# Patient Record
Sex: FEMALE | Race: WHITE | NOT HISPANIC OR LATINO | Employment: OTHER | URBAN - METROPOLITAN AREA
[De-identification: names, ages, dates, MRNs, and addresses within clinical notes are randomized per-mention and may not be internally consistent; named-entity substitution may affect disease eponyms.]

---

## 2023-09-01 ENCOUNTER — HOSPITAL ENCOUNTER (OUTPATIENT)
Dept: DATA CONVERSION | Facility: HOSPITAL | Age: 68
Discharge: HOME | End: 2023-09-01
Payer: MEDICARE

## 2023-09-01 DIAGNOSIS — E78.2 MIXED HYPERLIPIDEMIA: ICD-10-CM

## 2023-09-01 DIAGNOSIS — E11.9 TYPE 2 DIABETES MELLITUS WITHOUT COMPLICATIONS (MULTI): ICD-10-CM

## 2023-09-01 DIAGNOSIS — E04.1 NONTOXIC SINGLE THYROID NODULE: ICD-10-CM

## 2023-09-01 DIAGNOSIS — I10 ESSENTIAL (PRIMARY) HYPERTENSION: ICD-10-CM

## 2023-09-01 LAB
ALBUMIN SERPL-MCNC: 3.7 GM/DL (ref 3.5–5)
ALBUMIN/GLOB SERPL: 1.1 RATIO (ref 1.5–3)
ALP BLD-CCNC: 84 U/L (ref 35–125)
ALT SERPL-CCNC: 13 U/L (ref 5–40)
ANION GAP SERPL CALCULATED.3IONS-SCNC: 11 MMOL/L (ref 0–19)
APPEARANCE PLAS: ABNORMAL
AST SERPL-CCNC: 16 U/L (ref 5–40)
BASOPHILS # BLD AUTO: 0.04 K/UL (ref 0–0.22)
BASOPHILS NFR BLD AUTO: 0.3 % (ref 0–1)
BILIRUB SERPL-MCNC: 0.5 MG/DL (ref 0.1–1.2)
BUN SERPL-MCNC: 17 MG/DL (ref 8–25)
BUN/CREAT SERPL: 24.3 RATIO (ref 8–21)
CALCIUM SERPL-MCNC: 9.2 MG/DL (ref 8.5–10.4)
CHLORIDE SERPL-SCNC: 99 MMOL/L (ref 97–107)
CHOLEST SERPL-MCNC: 148 MG/DL (ref 133–200)
CHOLEST/HDLC SERPL: 3.2 RATIO
CO2 SERPL-SCNC: 27 MMOL/L (ref 24–31)
COLOR SPUN FLD: YELLOW
CREAT SERPL-MCNC: 0.7 MG/DL (ref 0.4–1.6)
DEPRECATED RDW RBC AUTO: 42.2 FL (ref 37–54)
DIFFERENTIAL METHOD BLD: ABNORMAL
EOSINOPHIL # BLD AUTO: 0.15 K/UL (ref 0–0.45)
EOSINOPHIL NFR BLD: 1.2 % (ref 0–3)
ERYTHROCYTE [DISTWIDTH] IN BLOOD BY AUTOMATED COUNT: 12.9 % (ref 11.7–15)
FASTING STATUS PATIENT QL REPORTED: ABNORMAL
GFR SERPL CREATININE-BSD FRML MDRD: 94 ML/MIN/1.73 M2
GLOBULIN SER-MCNC: 3.3 G/DL (ref 1.9–3.7)
GLUCOSE SERPL-MCNC: 200 MG/DL (ref 65–99)
HBA1C MFR BLD: 8.6 % (ref 4–6)
HCT VFR BLD AUTO: 49.7 % (ref 36–44)
HDLC SERPL-MCNC: 46 MG/DL
HGB BLD-MCNC: 16.7 GM/DL (ref 12–15)
IMM GRANULOCYTES # BLD AUTO: 0.1 K/UL (ref 0–0.1)
LDLC SERPL CALC-MCNC: 78 MG/DL (ref 65–130)
LYMPHOCYTES # BLD AUTO: 2.74 K/UL (ref 1.2–3.2)
LYMPHOCYTES NFR BLD MANUAL: 22.6 % (ref 20–40)
MCH RBC QN AUTO: 29.7 PG (ref 26–34)
MCHC RBC AUTO-ENTMCNC: 33.6 % (ref 31–37)
MCV RBC AUTO: 88.3 FL (ref 80–100)
MONOCYTES # BLD AUTO: 0.76 K/UL (ref 0–0.8)
MONOCYTES NFR BLD MANUAL: 6.3 % (ref 0–8)
NEUTROPHILS # BLD AUTO: 8.36 K/UL
NEUTROPHILS # BLD AUTO: 8.36 K/UL (ref 1.8–7.7)
NEUTROPHILS.IMMATURE NFR BLD: 0.8 % (ref 0–1)
NEUTS SEG NFR BLD: 68.8 % (ref 50–70)
NRBC BLD-RTO: 0 /100 WBC
PLATELET # BLD AUTO: 232 K/UL (ref 150–450)
PMV BLD AUTO: 11.9 CU (ref 7–12.6)
POTASSIUM SERPL-SCNC: 5.1 MMOL/L (ref 3.4–5.1)
PROT SERPL-MCNC: 7 G/DL (ref 5.9–7.9)
RBC # BLD AUTO: 5.63 M/UL (ref 4–4.9)
SODIUM SERPL-SCNC: 137 MMOL/L (ref 133–145)
TRIGL SERPL-MCNC: 121 MG/DL (ref 40–150)
TSH SERPL DL<=0.05 MIU/L-ACNC: 0.84 MIU/L (ref 0.27–4.2)
WBC # BLD AUTO: 12.2 K/UL (ref 4.5–11)

## 2023-09-22 ENCOUNTER — HOSPITAL ENCOUNTER (OUTPATIENT)
Dept: DATA CONVERSION | Facility: HOSPITAL | Age: 68
Discharge: HOME | End: 2023-09-22
Payer: MEDICARE

## 2023-09-22 DIAGNOSIS — E11.9 TYPE 2 DIABETES MELLITUS WITHOUT COMPLICATIONS (MULTI): ICD-10-CM

## 2023-09-22 LAB
CREAT UR-MCNC: 64.8 MG/DL
MICROALBUMIN UR-MCNC: 12 MG/L (ref 0–23)
MICROALBUMIN/CREAT UR: 18.5 MG/G (ref 0–30)

## 2023-10-03 DIAGNOSIS — M54.6 DORSALGIA OF THORACOLUMBAR REGION: Primary | ICD-10-CM

## 2023-10-03 DIAGNOSIS — R26.2 DIFFICULTY WALKING: ICD-10-CM

## 2023-10-03 DIAGNOSIS — M54.50 DORSALGIA OF THORACOLUMBAR REGION: Primary | ICD-10-CM

## 2023-10-04 ENCOUNTER — DOCUMENTATION (OUTPATIENT)
Dept: PHYSICAL THERAPY | Facility: HOSPITAL | Age: 68
End: 2023-10-04

## 2023-10-04 ENCOUNTER — TREATMENT (OUTPATIENT)
Dept: PHYSICAL THERAPY | Facility: CLINIC | Age: 68
End: 2023-10-04
Payer: MEDICARE

## 2023-10-04 DIAGNOSIS — M54.6 DORSALGIA OF THORACOLUMBAR REGION: ICD-10-CM

## 2023-10-04 DIAGNOSIS — R26.2 DIFFICULTY WALKING: ICD-10-CM

## 2023-10-04 DIAGNOSIS — M54.50 DORSALGIA OF THORACOLUMBAR REGION: ICD-10-CM

## 2023-10-04 PROCEDURE — 97110 THERAPEUTIC EXERCISES: CPT | Mod: GP | Performed by: PHYSICAL THERAPIST

## 2023-10-04 ASSESSMENT — PAIN SCALES - GENERAL: PAINLEVEL_OUTOF10: 4

## 2023-10-04 ASSESSMENT — ENCOUNTER SYMPTOMS
OCCASIONAL FEELINGS OF UNSTEADINESS: 1
LOSS OF SENSATION IN FEET: 1
DEPRESSION: 0

## 2023-10-04 ASSESSMENT — PAIN - FUNCTIONAL ASSESSMENT: PAIN_FUNCTIONAL_ASSESSMENT: 0-10

## 2023-10-04 NOTE — PROGRESS NOTES
Physical Therapy    Physical Therapy Treatment    Patient Name: Katya Kaplan  MRN: [id number]  Today's Date: 10/4/2023         Assessment:  PT Assessment  PT Assessment Results: Impaired balance, Decreased endurance, Decreased strength  Rehab Prognosis: Fair    Plan:  Continue to progress standing exercise, if pain improves, trial cane, emphasize LE flexibility and ROM to tolerance     Current Problem  1. Dorsalgia of thoracolumbar region  Follow Up In Physical Therapy      2. Difficulty walking  Follow Up In Physical Therapy          Subjective   General  Pt reports that flexion exercises resolved pain in lumbar spine/can work to resolve pain if it comes on, primary complaint is in RLE on lateral thigh/knee.   Precautions  Fall risk   Vital Signs     Pain   At rest, no pain, 4/10 with standing    Objective   Cognition     Posture  Forward head, rounded shoulder   Extremity/Trunk Assessment       Lumbar Spine    Functional Rating Scale     Observation     Lumbar Palpation/Joint Mobility Assessment     Lumbar AROM   Lumbar Flexion Arom WFL, limited due to body habitus.  Extension AROM mod limit c pain  LSB mod limit, RSB mod limit with L pain   Hip AROM     Lumbar Myotomes     Specific Lower Extremity MMT     DTR     Dermatomes     Special Tests              , HIP                 Lumbar AROM WFL unless documented below     Hip AROM WFL unless documented below     Hip PROM WFL unless documented below     Specific Lower Extremity MMT WFL unless documented below     DTR WFL unless documented below     Special Tests Negative unless documented below             , and KNEE                 Knee AROM WFL unless documented below     Knee PROM WFL unless documented below     Knee MMT WFL unless documented below     DTR WFL unless documented below     Special Tests Negative unless documented below                 Outcome Measures:           Treatments:  Therapeutic Exercise  Therapeutic Exercise Performed: Yes  Activity:        OP EDUCATION:       Goals:

## 2023-10-09 ENCOUNTER — PHARMACY VISIT (OUTPATIENT)
Dept: PHARMACY | Facility: CLINIC | Age: 68
End: 2023-10-09
Payer: MEDICARE

## 2023-10-09 PROCEDURE — RXMED WILLOW AMBULATORY MEDICATION CHARGE

## 2023-10-09 RX ORDER — GABAPENTIN 300 MG/1
CAPSULE ORAL
Qty: 180 CAPSULE | Refills: 0 | Status: CANCELLED | OUTPATIENT
Start: 2023-10-09 | End: 2024-10-08

## 2023-10-11 DIAGNOSIS — E11.9 TYPE 2 DIABETES MELLITUS WITHOUT COMPLICATION, UNSPECIFIED WHETHER LONG TERM INSULIN USE (MULTI): ICD-10-CM

## 2023-10-11 PROBLEM — E66.9 OBESITY: Status: ACTIVE | Noted: 2022-10-23

## 2023-10-11 PROBLEM — J44.9 CHRONIC OBSTRUCTIVE PULMONARY DISEASE (MULTI): Status: ACTIVE | Noted: 2018-12-30

## 2023-10-11 PROBLEM — M19.90 OSTEOARTHRITIS: Status: ACTIVE | Noted: 2021-02-21

## 2023-10-11 PROBLEM — M17.12 ARTHRITIS OF KNEE, LEFT: Status: ACTIVE | Noted: 2023-10-11

## 2023-10-11 PROBLEM — E11.69 DIABETES MELLITUS TYPE 2 IN OBESE: Status: ACTIVE | Noted: 2023-10-11

## 2023-10-11 PROBLEM — R06.00 DYSPNEA: Status: ACTIVE | Noted: 2023-10-11

## 2023-10-11 PROBLEM — R60.9 EDEMA: Status: ACTIVE | Noted: 2023-10-11

## 2023-10-11 PROBLEM — R09.02 HYPOXIA: Status: ACTIVE | Noted: 2023-10-11

## 2023-10-11 PROBLEM — E78.5 HYPERLIPIDEMIA: Status: ACTIVE | Noted: 2023-10-11

## 2023-10-11 PROBLEM — E04.1 NONTOXIC SINGLE THYROID NODULE: Status: ACTIVE | Noted: 2019-09-04

## 2023-10-11 PROBLEM — M54.9 DORSALGIA: Status: ACTIVE | Noted: 2023-10-11

## 2023-10-11 PROBLEM — M48.062 LUMBAR STENOSIS WITH NEUROGENIC CLAUDICATION: Status: ACTIVE | Noted: 2023-10-11

## 2023-10-11 PROBLEM — I26.99 PE (PULMONARY THROMBOEMBOLISM) (MULTI): Status: ACTIVE | Noted: 2021-10-20

## 2023-10-11 PROBLEM — E78.2 MIXED HYPERLIPIDEMIA: Status: ACTIVE | Noted: 2018-12-30

## 2023-10-11 PROBLEM — I21.4 NON-ST ELEVATION MI (NSTEMI) (MULTI): Status: ACTIVE | Noted: 2021-10-20

## 2023-10-11 PROBLEM — G89.29 CHRONIC PAIN OF LEFT KNEE: Status: ACTIVE | Noted: 2023-10-11

## 2023-10-11 PROBLEM — E66.9 DIABETES MELLITUS TYPE 2 IN OBESE: Status: ACTIVE | Noted: 2023-10-11

## 2023-10-11 PROBLEM — D72.829 ELEVATED WHITE BLOOD CELL COUNT, UNSPECIFIED: Status: ACTIVE | Noted: 2019-09-04

## 2023-10-11 PROBLEM — R07.89 CHEST DISCOMFORT: Status: ACTIVE | Noted: 2023-10-11

## 2023-10-11 PROBLEM — R73.09 BLOOD GLUCOSE ABNORMAL: Status: ACTIVE | Noted: 2023-10-11

## 2023-10-11 PROBLEM — I10 HTN (HYPERTENSION): Status: ACTIVE | Noted: 2018-12-30

## 2023-10-11 PROBLEM — F51.01 PRIMARY INSOMNIA: Status: ACTIVE | Noted: 2019-09-20

## 2023-10-11 PROBLEM — E66.01 MORBIDLY OBESE (MULTI): Status: ACTIVE | Noted: 2023-10-11

## 2023-10-11 PROBLEM — N18.9 CKD (CHRONIC KIDNEY DISEASE): Status: ACTIVE | Noted: 2022-09-09

## 2023-10-11 PROBLEM — I10 BENIGN ESSENTIAL HYPERTENSION: Status: ACTIVE | Noted: 2019-09-04

## 2023-10-11 PROBLEM — R55 SYNCOPE: Status: ACTIVE | Noted: 2023-10-11

## 2023-10-11 PROBLEM — J44.9 COPD MIXED TYPE (MULTI): Status: ACTIVE | Noted: 2021-02-19

## 2023-10-11 PROBLEM — M43.16 SPONDYLOLISTHESIS AT L4-L5 LEVEL: Status: ACTIVE | Noted: 2023-10-11

## 2023-10-11 PROBLEM — R26.2 DIFFICULTY WALKING: Status: ACTIVE | Noted: 2023-10-11

## 2023-10-11 PROBLEM — M16.12 ARTHRITIS OF LEFT HIP: Status: ACTIVE | Noted: 2023-10-11

## 2023-10-11 PROBLEM — R94.31 ELECTROCARDIOGRAM ABNORMAL: Status: ACTIVE | Noted: 2023-10-11

## 2023-10-11 PROBLEM — M54.50 LUMBAR PAIN: Status: ACTIVE | Noted: 2023-10-11

## 2023-10-11 PROBLEM — I48.91 ATRIAL FIBRILLATION (MULTI): Status: ACTIVE | Noted: 2023-10-11

## 2023-10-11 PROBLEM — I25.10 CORONARY ARTERIOSCLEROSIS: Status: ACTIVE | Noted: 2023-10-11

## 2023-10-11 PROBLEM — M25.562 CHRONIC PAIN OF LEFT KNEE: Status: ACTIVE | Noted: 2023-10-11

## 2023-10-11 PROBLEM — R94.5 ABNORMAL RESULTS OF LIVER FUNCTION STUDIES: Status: ACTIVE | Noted: 2019-09-04

## 2023-10-11 PROBLEM — I20.9 ANGINA PECTORIS (CMS-HCC): Status: ACTIVE | Noted: 2023-10-11

## 2023-10-11 RX ORDER — INSULIN HUMAN 100 [IU]/ML
INJECTION, SUSPENSION SUBCUTANEOUS
COMMUNITY
Start: 2014-01-30 | End: 2024-05-31 | Stop reason: WASHOUT

## 2023-10-11 RX ORDER — ATORVASTATIN CALCIUM 80 MG/1
1 TABLET, FILM COATED ORAL DAILY
COMMUNITY
Start: 2014-01-30 | End: 2024-05-31 | Stop reason: WASHOUT

## 2023-10-11 RX ORDER — OMEGA-3-ACID ETHYL ESTERS 1 G/1
CAPSULE, LIQUID FILLED ORAL
COMMUNITY
Start: 2022-04-04

## 2023-10-11 RX ORDER — FUROSEMIDE 40 MG/1
1 TABLET ORAL DAILY
COMMUNITY
Start: 2014-01-30 | End: 2024-05-31 | Stop reason: WASHOUT

## 2023-10-11 RX ORDER — SPIRONOLACTONE 25 MG/1
25 TABLET ORAL DAILY
COMMUNITY
Start: 2022-04-14

## 2023-10-11 RX ORDER — HUMAN INSULIN 100 [USP'U]/ML
20 INJECTION, SUSPENSION SUBCUTANEOUS NIGHTLY
COMMUNITY
Start: 2022-04-14 | End: 2024-05-31 | Stop reason: WASHOUT

## 2023-10-11 RX ORDER — AMMONIUM LACTATE 12 G/100G
LOTION TOPICAL
COMMUNITY
Start: 2019-09-03

## 2023-10-11 RX ORDER — ALBUTEROL SULFATE 0.83 MG/ML
2.5 SOLUTION RESPIRATORY (INHALATION) EVERY 6 HOURS
COMMUNITY
End: 2024-05-31 | Stop reason: WASHOUT

## 2023-10-11 RX ORDER — OMEPRAZOLE 20 MG/1
20 CAPSULE, DELAYED RELEASE ORAL DAILY
COMMUNITY
Start: 2022-09-09 | End: 2024-05-31 | Stop reason: WASHOUT

## 2023-10-11 RX ORDER — HUMAN INSULIN 100 [IU]/ML
INJECTION, SUSPENSION SUBCUTANEOUS
COMMUNITY
Start: 2023-07-21

## 2023-10-11 RX ORDER — LISINOPRIL AND HYDROCHLOROTHIAZIDE 10; 12.5 MG/1; MG/1
1 TABLET ORAL DAILY
COMMUNITY
Start: 2014-01-30 | End: 2024-05-31 | Stop reason: WASHOUT

## 2023-10-11 RX ORDER — METOPROLOL SUCCINATE 25 MG/1
1 TABLET, EXTENDED RELEASE ORAL DAILY
COMMUNITY
Start: 2022-05-17 | End: 2024-05-31 | Stop reason: WASHOUT

## 2023-10-11 RX ORDER — OMEPRAZOLE 40 MG/1
1 CAPSULE, DELAYED RELEASE ORAL DAILY
COMMUNITY
Start: 2021-05-03

## 2023-10-11 RX ORDER — HUMAN INSULIN 100 [IU]/ML
INJECTION, SOLUTION SUBCUTANEOUS
COMMUNITY
Start: 2022-03-08 | End: 2023-10-11 | Stop reason: SDUPTHER

## 2023-10-11 RX ORDER — HUMAN INSULIN 100 [IU]/ML
INJECTION, SOLUTION SUBCUTANEOUS
Qty: 10 ML | Refills: 1 | Status: SHIPPED | OUTPATIENT
Start: 2023-10-11

## 2023-10-11 RX ORDER — DICYCLOMINE HYDROCHLORIDE 10 MG/1
10 CAPSULE ORAL 3 TIMES DAILY PRN
COMMUNITY
Start: 2023-06-13 | End: 2024-05-31 | Stop reason: WASHOUT

## 2023-10-12 ENCOUNTER — TREATMENT (OUTPATIENT)
Dept: PHYSICAL THERAPY | Facility: CLINIC | Age: 68
End: 2023-10-12
Payer: MEDICARE

## 2023-10-12 DIAGNOSIS — M54.6 DORSALGIA OF THORACOLUMBAR REGION: ICD-10-CM

## 2023-10-12 DIAGNOSIS — M54.50 DORSALGIA OF THORACOLUMBAR REGION: ICD-10-CM

## 2023-10-12 DIAGNOSIS — M54.9 DORSALGIA: Primary | ICD-10-CM

## 2023-10-12 DIAGNOSIS — R26.2 DIFFICULTY WALKING: ICD-10-CM

## 2023-10-12 PROCEDURE — 97014 ELECTRIC STIMULATION THERAPY: CPT | Mod: GP | Performed by: PHYSICAL THERAPIST

## 2023-10-12 PROCEDURE — 97110 THERAPEUTIC EXERCISES: CPT | Mod: GP | Performed by: PHYSICAL THERAPIST

## 2023-10-12 PROCEDURE — 97140 MANUAL THERAPY 1/> REGIONS: CPT | Mod: GP | Performed by: PHYSICAL THERAPIST

## 2023-10-12 ASSESSMENT — PAIN SCALES - GENERAL: PAINLEVEL_OUTOF10: 9

## 2023-10-12 ASSESSMENT — PAIN DESCRIPTION - DESCRIPTORS: DESCRIPTORS: SHARP

## 2023-10-12 ASSESSMENT — PAIN - FUNCTIONAL ASSESSMENT: PAIN_FUNCTIONAL_ASSESSMENT: 0-10

## 2023-10-12 NOTE — PROGRESS NOTES
"Physical Therapy    Physical Therapy Treatment    Patient Name: Katya Kaplan  MRN: [id number]  Today's Date: 10/12/2023         Assessment:   Patient with reduced muscle tension and some reduced pain post treatment. Patient will progress as able next visit as this exacerbation reduces.     Plan:   Progress as tolerated    Current Problem  1. Dorsalgia        2. Dorsalgia of thoracolumbar region  Follow Up In Physical Therapy      3. Difficulty walking  Follow Up In Physical Therapy          Subjective   General  Reason for Referral: Dorsalgia  Referred By: Dr Victor\"I am really hurting in the back. The pain started last night. I don't know why and it is across the entire back. Before that I was feeling better.\"   Precautions     Vital Signs     Pain  Pain Assessment: 0-10  Pain Score: 9  Pain Location: Back  Pain Orientation:  (lower back generally)  Pain Descriptors: Sharp    Objective   Treatments:     Activity:       OP EDUCATION:   Use of TENS unit at home, heat vs ice for pain reduction, and holding HEP for next 24 hours    Goals:     "

## 2023-10-16 ENCOUNTER — PHARMACY VISIT (OUTPATIENT)
Dept: PHARMACY | Facility: CLINIC | Age: 68
End: 2023-10-16
Payer: MEDICARE

## 2023-10-16 DIAGNOSIS — I21.4 NON-ST ELEVATION MI (NSTEMI) (MULTI): ICD-10-CM

## 2023-10-16 DIAGNOSIS — M54.50 DORSALGIA OF THORACOLUMBAR REGION: Primary | ICD-10-CM

## 2023-10-16 DIAGNOSIS — M54.6 DORSALGIA OF THORACOLUMBAR REGION: Primary | ICD-10-CM

## 2023-10-16 DIAGNOSIS — R26.2 DIFFICULTY WALKING: ICD-10-CM

## 2023-10-16 DIAGNOSIS — I21.4 NON-ST ELEVATION MI (NSTEMI) (MULTI): Primary | ICD-10-CM

## 2023-10-16 DIAGNOSIS — M54.9 DORSALGIA: ICD-10-CM

## 2023-10-16 DIAGNOSIS — E78.2 MIXED HYPERLIPIDEMIA: ICD-10-CM

## 2023-10-16 PROCEDURE — RXMED WILLOW AMBULATORY MEDICATION CHARGE

## 2023-10-17 ENCOUNTER — PHARMACY VISIT (OUTPATIENT)
Dept: PHARMACY | Facility: CLINIC | Age: 68
End: 2023-10-17
Payer: MEDICARE

## 2023-10-17 PROCEDURE — RXMED WILLOW AMBULATORY MEDICATION CHARGE

## 2023-10-17 RX ORDER — EVOLOCUMAB 140 MG/ML
INJECTION, SOLUTION SUBCUTANEOUS
Qty: 2 ML | Refills: 5 | Status: SHIPPED | OUTPATIENT
Start: 2023-10-17 | End: 2024-04-09 | Stop reason: SDUPTHER

## 2023-10-17 NOTE — TELEPHONE ENCOUNTER
Requested Prescriptions     Pending Prescriptions Disp Refills    evolocumab (Repatha SureClick) 140 mg/mL injection 2 mL 5     Sig: INJECT 140MG SUBCUTANEOUSLY EVERY 2 WEEKS     Please send the attached prescription for the patient. Thank you.

## 2023-10-18 ENCOUNTER — PHARMACY VISIT (OUTPATIENT)
Dept: PHARMACY | Facility: CLINIC | Age: 68
End: 2023-10-18
Payer: MEDICARE

## 2023-10-18 ENCOUNTER — TREATMENT (OUTPATIENT)
Dept: PHYSICAL THERAPY | Facility: CLINIC | Age: 68
End: 2023-10-18
Payer: MEDICARE

## 2023-10-18 DIAGNOSIS — M54.50 DORSALGIA OF THORACOLUMBAR REGION: ICD-10-CM

## 2023-10-18 DIAGNOSIS — M54.6 DORSALGIA OF THORACOLUMBAR REGION: ICD-10-CM

## 2023-10-18 DIAGNOSIS — M48.062 LUMBAR STENOSIS WITH NEUROGENIC CLAUDICATION: Primary | ICD-10-CM

## 2023-10-18 DIAGNOSIS — M54.9 DORSALGIA: ICD-10-CM

## 2023-10-18 DIAGNOSIS — R26.2 DIFFICULTY WALKING: ICD-10-CM

## 2023-10-18 PROCEDURE — 97110 THERAPEUTIC EXERCISES: CPT | Mod: GP | Performed by: PHYSICAL THERAPIST

## 2023-10-18 PROCEDURE — 97140 MANUAL THERAPY 1/> REGIONS: CPT | Mod: GP | Performed by: PHYSICAL THERAPIST

## 2023-10-18 PROCEDURE — RXMED WILLOW AMBULATORY MEDICATION CHARGE

## 2023-10-18 RX ORDER — GABAPENTIN 300 MG/1
600 CAPSULE ORAL 3 TIMES DAILY
COMMUNITY
End: 2023-10-18 | Stop reason: SDUPTHER

## 2023-10-18 RX ORDER — GABAPENTIN 300 MG/1
600 CAPSULE ORAL 3 TIMES DAILY
Qty: 180 CAPSULE | Refills: 3 | Status: SHIPPED | OUTPATIENT
Start: 2023-10-18 | End: 2024-02-15 | Stop reason: SDUPTHER

## 2023-10-18 RX ORDER — GABAPENTIN 300 MG/1
CAPSULE ORAL
Qty: 180 CAPSULE | Refills: 0 | Status: CANCELLED | OUTPATIENT
Start: 2023-10-18 | End: 2024-10-17

## 2023-10-18 ASSESSMENT — PAIN DESCRIPTION - DESCRIPTORS: DESCRIPTORS: ACHING

## 2023-10-18 ASSESSMENT — PAIN - FUNCTIONAL ASSESSMENT: PAIN_FUNCTIONAL_ASSESSMENT: 0-10

## 2023-10-18 ASSESSMENT — PAIN SCALES - GENERAL: PAINLEVEL_OUTOF10: 4

## 2023-10-18 NOTE — PROGRESS NOTES
"Physical Therapy    Physical Therapy Treatment    Patient Name: Katya Kaplan  MRN: [id number]  Today's Date: 10/18/2023  Time Calculation  Start Time: 1035  Stop Time: 1113  Time Calculation (min): 38 min      Assessment:   Patient was able to return to more therapeutic exercises with some increased intensity this date with little pain increase. Progression of strength of the core musculature will help her move towards achievement of all long term goals.     Plan:   Progress as tolerated    Current Problem  1. Dorsalgia of thoracolumbar region  Follow Up In Physical Therapy      2. Dorsalgia  Follow Up In Physical Therapy      3. Difficulty walking  Follow Up In Physical Therapy          Subjective   General  Reason for Referral: Dorsalgia  Referred By: Dr Victor  \"I feel a lot better then I did last week. I am moving much better. It still does hurt to move.\"   Precautions     Vital Signs     Pain  Pain Assessment: 0-10  Pain Score: 4  Pain Location: Back  Pain Descriptors: Aching    Objective        Treatments:  Therapeutic Exercise  Therapeutic Exercise Performed: Yes  Therapeutic Exercise Activity 1: seated 3 way Sball trunk flexion stretch  Therapeutic Exercise Activity 3: Sci fit seat 12, 5 min, level 1  Therapeutic Exercise Activity 4: Seated: TA bracing: 10 x 5\"  Therapeutic Exercise Activity 5: TA brace with hip adduction 10 x 5\"  Therapeutic Exercise Activity 6: TA brace wtih alternating LAQ: 10 x Each  Therapeutic Exercise Activity 7: standing hip extension: 10 x R/L    Manual Therapy  Manual Therapy Performed: Yes  Manual Therapy Activity 1: Patient seated and trunk flexed to treatment table: DTM wth trigger point release performed to the lumbar paraspinals and multifidus with left concentration       Activity:       OP EDUCATION:   Continued progression of HEP and use of heating pad and TENS unit to reduce chances of exacerbations.     Goals:  Active       Mobility       LTG - Patient will ambulate " community distance or up to 15 minutes with LRD to allow for improved participation in home and leisure activities similar to PLOF (cane) (Progressing)       Start:  10/04/23    Expected End:  11/01/23            Strength (Progressing)       Start:  10/04/23    Expected End:  11/01/23       Pt will demonstrate trunk extensor and abd strength to 4+/5 to allow for improved low back pain and prevention of injury during standing ADLs            Pain

## 2023-10-20 ENCOUNTER — PHARMACY VISIT (OUTPATIENT)
Dept: PHARMACY | Facility: CLINIC | Age: 68
End: 2023-10-20
Payer: MEDICARE

## 2023-10-20 PROCEDURE — RXMED WILLOW AMBULATORY MEDICATION CHARGE

## 2023-10-25 ENCOUNTER — TREATMENT (OUTPATIENT)
Dept: PHYSICAL THERAPY | Facility: CLINIC | Age: 68
End: 2023-10-25
Payer: MEDICARE

## 2023-10-25 DIAGNOSIS — R26.2 DIFFICULTY WALKING: ICD-10-CM

## 2023-10-25 DIAGNOSIS — M54.6 DORSALGIA OF THORACOLUMBAR REGION: ICD-10-CM

## 2023-10-25 DIAGNOSIS — M54.50 DORSALGIA OF THORACOLUMBAR REGION: ICD-10-CM

## 2023-10-25 DIAGNOSIS — M54.9 DORSALGIA: ICD-10-CM

## 2023-10-25 PROCEDURE — 97140 MANUAL THERAPY 1/> REGIONS: CPT | Mod: GP | Performed by: PHYSICAL THERAPIST

## 2023-10-25 PROCEDURE — 97110 THERAPEUTIC EXERCISES: CPT | Mod: GP | Performed by: PHYSICAL THERAPIST

## 2023-10-25 ASSESSMENT — PAIN SCALES - GENERAL: PAINLEVEL_OUTOF10: 1

## 2023-10-25 ASSESSMENT — PAIN - FUNCTIONAL ASSESSMENT: PAIN_FUNCTIONAL_ASSESSMENT: 0-10

## 2023-10-25 NOTE — PROGRESS NOTES
"Physical Therapy    Physical Therapy Treatment    Patient Name: Katya Kaplan  MRN: [id number]  Today's Date: 10/25/2023         Assessment:   Patient with overall reduced muscle tension to large trigger point in the distal left multifidus and some generalized tension on the right. The combination of increased therapeutic exercise intensity will help maintain the reduced pain and improved ability.     Plan:   Progress as tolerated with increased core strengthening    Current Problem  1. Dorsalgia of thoracolumbar region  Follow Up In Physical Therapy      2. Dorsalgia  Follow Up In Physical Therapy      3. Difficulty walking  Follow Up In Physical Therapy          Subjective   General  Reason for Referral: Dorsalgia  Referred By: Dr Victor  Precautions     Vital Signs     Pain  Pain Assessment: 0-10  Pain Score: 1  Pain Location: Back    Objective        Treatments:  Therapeutic Exercise  Therapeutic Exercise Performed: Yes  Therapeutic Exercise Activity 1: seated 3 way Sball trunk flexion stretch: 3 x 20\"  Therapeutic Exercise Activity 2: Seated extension: 10 x 3\"  Therapeutic Exercise Activity 3: Sci fit seat 12, 5 min, level 1  Therapeutic Exercise Activity 4: Seated: TA bracing: 10 x 5\"  Therapeutic Exercise Activity 5: TA brace with hip adduction 10 x 5\"  Therapeutic Exercise Activity 6: TA brace wtih alternating LAQ: 10 x Each    Manual Therapy  Manual Therapy Performed: Yes  Manual Therapy Activity 1: Patient was placed in seated with trunk flexed to treatment table and DTM with trigger point release to the lumbar paraspinals and Multifidus  Activity:       OP EDUCATION:   Use of standard cane for short distances in the home to start the transition to a less restrictive AD    Goals:  Active       Mobility       LTG - Patient will ambulate community distance or up to 15 minutes with LRD to allow for improved participation in home and leisure activities similar to PLOF (cane) (Progressing)       Start:  " 10/04/23    Expected End:  11/01/23            Strength (Progressing)       Start:  10/04/23    Expected End:  11/01/23       Pt will demonstrate trunk extensor and abd strength to 4+/5 to allow for improved low back pain and prevention of injury during standing ADLs            Pain

## 2023-11-01 ENCOUNTER — TREATMENT (OUTPATIENT)
Dept: PHYSICAL THERAPY | Facility: CLINIC | Age: 68
End: 2023-11-01
Payer: MEDICARE

## 2023-11-01 DIAGNOSIS — M54.6 DORSALGIA OF THORACOLUMBAR REGION: ICD-10-CM

## 2023-11-01 DIAGNOSIS — M54.50 DORSALGIA OF THORACOLUMBAR REGION: ICD-10-CM

## 2023-11-01 DIAGNOSIS — M54.9 DORSALGIA: ICD-10-CM

## 2023-11-01 DIAGNOSIS — R26.2 DIFFICULTY WALKING: ICD-10-CM

## 2023-11-01 PROCEDURE — 97110 THERAPEUTIC EXERCISES: CPT | Mod: GP | Performed by: PHYSICAL THERAPIST

## 2023-11-01 PROCEDURE — 97140 MANUAL THERAPY 1/> REGIONS: CPT | Mod: GP | Performed by: PHYSICAL THERAPIST

## 2023-11-01 ASSESSMENT — PAIN SCALES - GENERAL: PAINLEVEL_OUTOF10: 2

## 2023-11-01 ASSESSMENT — PAIN - FUNCTIONAL ASSESSMENT: PAIN_FUNCTIONAL_ASSESSMENT: 0-10

## 2023-11-01 NOTE — PROGRESS NOTES
"Physical Therapy    Physical Therapy Treatment    Patient Name: Katya Kaplan  MRN: [id number]  Today's Date: 11/1/2023         Assessment:   Patient was able to progress therapeutic exercise intensity this date with only noted fatigue post treatment.     Plan:   Reassess next visit    Current Problem  1. Dorsalgia of thoracolumbar region  Follow Up In Physical Therapy      2. Dorsalgia  Follow Up In Physical Therapy      3. Difficulty walking  Follow Up In Physical Therapy          Subjective   General  Reason for Referral: Dorsalgia  Referred By: Dr Victor    \"I have been feeling better in the back. The knees are what is hurting right now.\"  Precautions     Vital Signs     Pain  Pain Assessment: 0-10  Pain Score: 2  Pain Location: Back    Objective        Treatments:  Therapeutic Exercise  Therapeutic Exercise Performed: Yes  Therapeutic Exercise Activity 1: seated 3 way Sball trunk flexion stretch: 3 x 20\"  Therapeutic Exercise Activity 2: Seated extension: 10 x 3\"  Therapeutic Exercise Activity 3: Seated: TA bracing: 15 x 5\"  Therapeutic Exercise Activity 4: TA brace with hip adduction: 10 x5\"  Therapeutic Exercise Activity 5: TA brace with hip abduction: 10 x 5\" green  Therapeutic Exercise Activity 6: TA Brace with hip abduction: Green:  Therapeutic Exercise Activity 7: TA brace with marches; 10 x green Tband  Therapeutic Exercise Activity 8: standing: Hip extension alternating 10 x    Manual Therapy  Manual Therapy Performed: Yes  Manual Therapy Activity 1: Patient placed in seated with trunk flexed to treatment table: DTM to the bilateral multifidus and obliques with good release of muscle tension.  Activity:       OP EDUCATION:   Reassessment next week    Goals:  Active       Mobility       LTG - Patient will ambulate community distance or up to 15 minutes with LRD to allow for improved participation in home and leisure activities similar to PLOF (cane) (Progressing)       Start:  10/04/23    Expected End:  " 11/01/23            Strength (Progressing)       Start:  10/04/23    Expected End:  11/01/23       Pt will demonstrate trunk extensor and abd strength to 4+/5 to allow for improved low back pain and prevention of injury during standing ADLs            Pain

## 2023-11-08 ENCOUNTER — TREATMENT (OUTPATIENT)
Dept: PHYSICAL THERAPY | Facility: CLINIC | Age: 68
End: 2023-11-08
Payer: MEDICARE

## 2023-11-08 DIAGNOSIS — M54.9 DORSALGIA: Primary | ICD-10-CM

## 2023-11-08 PROCEDURE — 97530 THERAPEUTIC ACTIVITIES: CPT | Mod: GP | Performed by: PHYSICAL THERAPIST

## 2023-11-08 ASSESSMENT — PAIN SCALES - GENERAL: PAINLEVEL_OUTOF10: 0 - NO PAIN

## 2023-11-08 ASSESSMENT — PAIN - FUNCTIONAL ASSESSMENT: PAIN_FUNCTIONAL_ASSESSMENT: 0-10

## 2023-11-08 NOTE — PROGRESS NOTES
"Physical Therapy    Physical Therapy Treatment    Patient Name: Katya Kaplan  MRN: [id number]  Today's Date: 11/8/2023         Assessment:   Patient is having some difficulty with walking due to BLE pain and dysfunction, but overall the lower back pain is much reduced and strength is improved. [facility]     Plan:   Discharge PT services     Current Problem  1. Dorsalgia            Subjective   General  Reason for Referral: Dorsalgia  Referred By: Dr Victor  \"My back is 90% better. I am just having a lot of trouble walking any distance. My legs start to hurt, my back stiffens up and I get short of breath. \"  Precautions     Vital Signs     Pain  Pain Assessment: 0-10  Pain Score: 0 - No pain  Pain Location: Back    Objective   Lumbar Palpation/Joint Mobility Assessment  Palpation/Joint Mobility Comment: Mild tension noted in the lumbar paraspinals without tenderness reported  Lumbar AROM  Lumbar AROM WFL: yes  Lumbar flexion: (60°): 50  Lumbar extension (25°): to 15 degrees  Hip AROM     Lumbar Myotomes  Lumbar Myotomes WFL: yes  Specific Lower Extremity MMT  Specific Lower Extremity MMT WFL: yes (Gross core stabilization strength 4 -4+/5)  DTR     Dermatomes  Dermatomes WFL: yes  Special Tests              Outcome Measures:   ERIC: 66%    Treatments:   Therapeutic activity: 38' for recheck testing and measurements and patient education on ways to improve lumbar muscle endurance and functional positioning, use of heat or ice for a few days to determine which modality is more of a treatment.   Activity:       OP EDUCATION:       Goals:  Active       Mobility       LTG - Patient will ambulate community distance or up to 15 minutes with LRD to allow for improved participation in home and leisure activities similar to PLOF (cane) (Not Progressing)       Start:  10/04/23    Expected End:  11/01/23            Strength (Met)       Start:  10/04/23    Expected End:  11/01/23    Resolved:  11/08/23    Pt will demonstrate trunk " extensor and abd strength to 4+/5 to allow for improved low back pain and prevention of injury during standing ADLs            Pain

## 2023-11-11 ENCOUNTER — PHARMACY VISIT (OUTPATIENT)
Dept: PHARMACY | Facility: CLINIC | Age: 68
End: 2023-11-11
Payer: MEDICARE

## 2023-11-11 DIAGNOSIS — J44.9 COPD MIXED TYPE (MULTI): Primary | ICD-10-CM

## 2023-11-11 DIAGNOSIS — I10 PRIMARY HYPERTENSION: ICD-10-CM

## 2023-11-11 PROCEDURE — RXMED WILLOW AMBULATORY MEDICATION CHARGE

## 2023-11-12 NOTE — TELEPHONE ENCOUNTER
Requested Prescriptions     Pending Prescriptions Disp Refills    metoprolol succinate XL (Toprol-XL) 50 mg 24 hr tablet 30 tablet 4     Sig: TAKE 1 TABLET BY MOUTH ONE TIME DAILY     Please send the attached prescription for the patient. They have a follow up scheduled. Thank you.

## 2023-11-13 RX ORDER — METOPROLOL SUCCINATE 50 MG/1
50 TABLET, EXTENDED RELEASE ORAL DAILY
Qty: 30 TABLET | Refills: 4 | Status: SHIPPED | OUTPATIENT
Start: 2023-11-13 | End: 2024-04-13 | Stop reason: SDUPTHER

## 2023-11-13 RX ORDER — FLUTICASONE FUROATE AND VILANTEROL 200; 25 UG/1; UG/1
1 POWDER RESPIRATORY (INHALATION) DAILY
Qty: 60 EACH | Refills: 3 | Status: SHIPPED | OUTPATIENT
Start: 2023-11-13 | End: 2024-03-13 | Stop reason: SDUPTHER

## 2023-11-15 ENCOUNTER — PHARMACY VISIT (OUTPATIENT)
Dept: PHARMACY | Facility: CLINIC | Age: 68
End: 2023-11-15
Payer: MEDICARE

## 2023-11-15 PROCEDURE — RXMED WILLOW AMBULATORY MEDICATION CHARGE

## 2023-11-17 ENCOUNTER — PHARMACY VISIT (OUTPATIENT)
Dept: PHARMACY | Facility: CLINIC | Age: 68
End: 2023-11-17
Payer: MEDICARE

## 2023-11-17 PROCEDURE — RXMED WILLOW AMBULATORY MEDICATION CHARGE

## 2023-12-10 PROCEDURE — RXMED WILLOW AMBULATORY MEDICATION CHARGE

## 2023-12-12 ENCOUNTER — OFFICE VISIT (OUTPATIENT)
Dept: ORTHOPEDIC SURGERY | Facility: CLINIC | Age: 68
End: 2023-12-12
Payer: MEDICARE

## 2023-12-12 DIAGNOSIS — M17.12 ARTHRITIS OF LEFT KNEE: ICD-10-CM

## 2023-12-12 DIAGNOSIS — M16.0 ARTHRITIS OF BOTH HIPS: Primary | ICD-10-CM

## 2023-12-12 PROCEDURE — 3052F HG A1C>EQUAL 8.0%<EQUAL 9.0%: CPT | Performed by: ORTHOPAEDIC SURGERY

## 2023-12-12 PROCEDURE — 1160F RVW MEDS BY RX/DR IN RCRD: CPT | Performed by: ORTHOPAEDIC SURGERY

## 2023-12-12 PROCEDURE — 1126F AMNT PAIN NOTED NONE PRSNT: CPT | Performed by: ORTHOPAEDIC SURGERY

## 2023-12-12 PROCEDURE — 1159F MED LIST DOCD IN RCRD: CPT | Performed by: ORTHOPAEDIC SURGERY

## 2023-12-12 PROCEDURE — 99213 OFFICE O/P EST LOW 20 MIN: CPT | Performed by: ORTHOPAEDIC SURGERY

## 2023-12-12 PROCEDURE — 96372 THER/PROPH/DIAG INJ SC/IM: CPT | Performed by: ORTHOPAEDIC SURGERY

## 2023-12-12 PROCEDURE — 20610 DRAIN/INJ JOINT/BURSA W/O US: CPT | Performed by: ORTHOPAEDIC SURGERY

## 2023-12-12 RX ORDER — TRIAMCINOLONE ACETONIDE 40 MG/ML
80 INJECTION, SUSPENSION INTRA-ARTICULAR; INTRAMUSCULAR
Status: COMPLETED | OUTPATIENT
Start: 2023-12-12 | End: 2023-12-12

## 2023-12-12 RX ORDER — KETOROLAC TROMETHAMINE 30 MG/ML
30 INJECTION, SOLUTION INTRAMUSCULAR; INTRAVENOUS
Status: COMPLETED | OUTPATIENT
Start: 2023-12-12 | End: 2023-12-12

## 2023-12-12 RX ORDER — LIDOCAINE HYDROCHLORIDE 20 MG/ML
3 INJECTION, SOLUTION INFILTRATION; PERINEURAL
Status: COMPLETED | OUTPATIENT
Start: 2023-12-12 | End: 2023-12-12

## 2023-12-12 RX ADMIN — TRIAMCINOLONE ACETONIDE 80 MG: 40 INJECTION, SUSPENSION INTRA-ARTICULAR; INTRAMUSCULAR at 14:47

## 2023-12-12 RX ADMIN — LIDOCAINE HYDROCHLORIDE 3 ML: 20 INJECTION, SOLUTION INFILTRATION; PERINEURAL at 14:47

## 2023-12-12 RX ADMIN — KETOROLAC TROMETHAMINE 30 MG: 30 INJECTION, SOLUTION INTRAMUSCULAR; INTRAVENOUS at 14:47

## 2023-12-12 ASSESSMENT — ENCOUNTER SYMPTOMS: KNEE DEFORMITY: 1

## 2023-12-12 NOTE — PROGRESS NOTES
Subjective    Patient ID: Mariam Kaplan is a 68 y.o. female.    Chief Complaint: Injections of the Left Knee     Last Surgery: No surgery found  Last Surgery Date: No surgery found    Left Knee    she is in for her left knee arthritis in both hips she has had a previous injections to all 3 joints and they have helped as hip injection was in the left side in July    Objective   Ortho Exam bilateral hip show diminished range of motion with pain on rotation left knee shows range of motion of 0 to 95 degrees but cannot tell if there is an effusion no distal edema neurovascular intact generalized discomfort    Image Results:  INJECT OR ASPIR JOINT LT  PROCEDURE:         INJECT OR ASPIR JOINT LT - IXR  0426  REASON FOR EXAM: M25.552  LEFT HIP PAIN    RESULT: MRN: [id number]  Patient Name: SHAKILA KAPLAN    STUDY:  INJECT OR ASPIR JOINT LT; 7/5/2023 10:48 am    INDICATION:  M25.552  LEFT HIP PAIN.    COMPARISON:  Left hip radiograph 09/15/2022.    ACCESSION NUMBER(S):  [id number]    ORDERING CLINICIAN:  RITA JALLOH    TECHNIQUE:  Therapeutic injection of the left hip joint under fluoroscopic guidance.    Total fluoroscopy time: 36 seconds  Dose: 22.21 mGy  Images: 1 spot image    FINDINGS:  Risks, benefits, indications, and alternatives to the procedure were  explained to the patient. Risks include bleeding, allergy and infection. All  questions were answered. Both written and verbal informed consent was  obtained. Procedure time out performed.    Patient was placed in the supine, slightly oblique position on the  fluoroscopic table.    Suitable site at the left lateral femoral head neck junction was marked  using fluoroscopic guidance and overlying skin prepped and draped in usual  sterile fashion.    Lidocaine 1% was used for local and deep anesthesia. A 22-gauge 3-1/2 inch  spinal needle was advanced under fluoroscopic guidance. Final intracapsular  location was confirmed with injection of a small amount of Omnipaque  240  contrast. Degenerative changes noted in the left hip.    Next, DepoMedrol 120 mg and lidocaine 2% 3 ml were injected without  complication.    Needle was removed and a sterile bandage applied. Patient tolerated the  procedure well. Patient was observed in the radiology department for 20  minutes following the procedure.    IMPRESSION:  Successful therapeutic injection of the left hip.    Dictation workstation:   [id number]    Original Interpreting Physician:   CHRISTOPHER URBINA MD  Original Transcribed by/Date: MMODAL Jul 5 2023  9:54A  Original Electronically Signed by/Date: CHRISTOPHER URBINA MD Jul 5 2023 10:55A    Addendum Interpreting Physician:  Addendum Transcribed by/Date: NO ADDENDUM  Addendum Electronically Signed by/Date:  OUTSIDE IMAGING SCAN  Ordered by an unspecified provider.      Assessment/Plan at this point we will go ahead and inject her left knee we will also get her injections into both hips all joints have arthritic change she is not a good surgical candidate at this point so hopefully the shots will help    L Inj/Asp: L knee on 12/12/2023 2:47 PM  Indications: pain  Details: 22 G needle, anterolateral approach  Medications: 3 mL lidocaine 20 mg/mL (2 %); 30 mg ketorolac 30 mg/mL (1 mL); 80 mg triamcinolone acetonide 40 mg/mL       Encounter Diagnoses:  Arthritis of both hips    Arthritis of left knee    Orders Placed This Encounter   • FL guided aspiration or injection large joint right   • FL guided aspiration or injection large joint left     No follow-ups on file.   99

## 2023-12-14 ENCOUNTER — HOSPITAL ENCOUNTER (OUTPATIENT)
Dept: RADIOLOGY | Facility: HOSPITAL | Age: 68
Discharge: HOME | End: 2023-12-14
Payer: MEDICARE

## 2023-12-14 DIAGNOSIS — M16.0 ARTHRITIS OF BOTH HIPS: ICD-10-CM

## 2023-12-14 PROCEDURE — 2500000005 HC RX 250 GENERAL PHARMACY W/O HCPCS: Performed by: ORTHOPAEDIC SURGERY

## 2023-12-14 PROCEDURE — 96372 THER/PROPH/DIAG INJ SC/IM: CPT | Performed by: ORTHOPAEDIC SURGERY

## 2023-12-14 PROCEDURE — 77002 NEEDLE LOCALIZATION BY XRAY: CPT | Mod: RT

## 2023-12-14 PROCEDURE — 2500000004 HC RX 250 GENERAL PHARMACY W/ HCPCS (ALT 636 FOR OP/ED): Performed by: ORTHOPAEDIC SURGERY

## 2023-12-14 PROCEDURE — 2550000001 HC RX 255 CONTRASTS: Performed by: ORTHOPAEDIC SURGERY

## 2023-12-14 PROCEDURE — RXMED WILLOW AMBULATORY MEDICATION CHARGE

## 2023-12-14 RX ORDER — LIDOCAINE HYDROCHLORIDE 20 MG/ML
20 INJECTION, SOLUTION INFILTRATION; PERINEURAL ONCE
Status: COMPLETED | OUTPATIENT
Start: 2023-12-14 | End: 2023-12-14

## 2023-12-14 RX ORDER — METHYLPREDNISOLONE ACETATE 40 MG/ML
40 INJECTION, SUSPENSION INTRA-ARTICULAR; INTRALESIONAL; INTRAMUSCULAR; SOFT TISSUE ONCE
Status: COMPLETED | OUTPATIENT
Start: 2023-12-14 | End: 2023-12-14

## 2023-12-14 RX ORDER — METHYLPREDNISOLONE ACETATE 80 MG/ML
80 INJECTION, SUSPENSION INTRA-ARTICULAR; INTRALESIONAL; INTRAMUSCULAR; SOFT TISSUE ONCE
Status: COMPLETED | OUTPATIENT
Start: 2023-12-14 | End: 2023-12-14

## 2023-12-14 RX ORDER — LIDOCAINE HYDROCHLORIDE 10 MG/ML
5 INJECTION, SOLUTION EPIDURAL; INFILTRATION; INTRACAUDAL; PERINEURAL ONCE
Status: COMPLETED | OUTPATIENT
Start: 2023-12-14 | End: 2023-12-14

## 2023-12-14 RX ADMIN — LIDOCAINE HYDROCHLORIDE 3 ML: 20 INJECTION, SOLUTION INFILTRATION; PERINEURAL at 09:57

## 2023-12-14 RX ADMIN — METHYLPREDNISOLONE ACETATE 80 MG: 80 INJECTION, SUSPENSION INTRA-ARTICULAR; INTRALESIONAL; INTRAMUSCULAR; SOFT TISSUE at 09:59

## 2023-12-14 RX ADMIN — IOHEXOL 2 ML: 240 INJECTION, SOLUTION INTRATHECAL; INTRAVASCULAR; INTRAVENOUS; ORAL at 09:58

## 2023-12-14 RX ADMIN — METHYLPREDNISOLONE ACETATE 40 MG: 40 INJECTION, SUSPENSION INTRA-ARTICULAR; INTRALESIONAL; INTRAMUSCULAR; INTRASYNOVIAL; SOFT TISSUE at 09:58

## 2023-12-14 RX ADMIN — LIDOCAINE HYDROCHLORIDE ANHYDROUS 5 ML: 10 INJECTION, SOLUTION INFILTRATION at 09:56

## 2023-12-15 PROCEDURE — RXMED WILLOW AMBULATORY MEDICATION CHARGE

## 2023-12-18 ENCOUNTER — HOSPITAL ENCOUNTER (OUTPATIENT)
Dept: RADIOLOGY | Facility: HOSPITAL | Age: 68
Discharge: HOME | End: 2023-12-18
Payer: MEDICARE

## 2023-12-18 ENCOUNTER — PHARMACY VISIT (OUTPATIENT)
Dept: PHARMACY | Facility: CLINIC | Age: 68
End: 2023-12-18
Payer: MEDICARE

## 2023-12-18 DIAGNOSIS — M16.0 ARTHRITIS OF BOTH HIPS: ICD-10-CM

## 2023-12-18 PROCEDURE — 20610 DRAIN/INJ JOINT/BURSA W/O US: CPT | Mod: LT

## 2023-12-18 PROCEDURE — 2550000001 HC RX 255 CONTRASTS: Performed by: ORTHOPAEDIC SURGERY

## 2023-12-18 PROCEDURE — 2500000004 HC RX 250 GENERAL PHARMACY W/ HCPCS (ALT 636 FOR OP/ED): Performed by: ORTHOPAEDIC SURGERY

## 2023-12-18 PROCEDURE — 2500000005 HC RX 250 GENERAL PHARMACY W/O HCPCS: Performed by: ORTHOPAEDIC SURGERY

## 2023-12-18 RX ORDER — METHYLPREDNISOLONE ACETATE 80 MG/ML
80 INJECTION, SUSPENSION INTRA-ARTICULAR; INTRALESIONAL; INTRAMUSCULAR; SOFT TISSUE ONCE
Status: COMPLETED | OUTPATIENT
Start: 2023-12-18 | End: 2023-12-18

## 2023-12-18 RX ORDER — METHYLPREDNISOLONE ACETATE 40 MG/ML
40 INJECTION, SUSPENSION INTRA-ARTICULAR; INTRALESIONAL; INTRAMUSCULAR; SOFT TISSUE ONCE
Status: COMPLETED | OUTPATIENT
Start: 2023-12-18 | End: 2023-12-18

## 2023-12-18 RX ORDER — LIDOCAINE HYDROCHLORIDE 20 MG/ML
3 INJECTION, SOLUTION INFILTRATION; PERINEURAL ONCE
Status: COMPLETED | OUTPATIENT
Start: 2023-12-18 | End: 2023-12-18

## 2023-12-18 RX ORDER — LIDOCAINE HYDROCHLORIDE 10 MG/ML
3 INJECTION, SOLUTION EPIDURAL; INFILTRATION; INTRACAUDAL; PERINEURAL ONCE
Status: COMPLETED | OUTPATIENT
Start: 2023-12-18 | End: 2023-12-18

## 2023-12-18 RX ADMIN — METHYLPREDNISOLONE ACETATE 40 MG: 40 INJECTION, SUSPENSION INTRA-ARTICULAR; INTRALESIONAL; INTRAMUSCULAR; INTRASYNOVIAL; SOFT TISSUE at 10:37

## 2023-12-18 RX ADMIN — METHYLPREDNISOLONE ACETATE 80 MG: 80 INJECTION, SUSPENSION INTRA-ARTICULAR; INTRALESIONAL; INTRAMUSCULAR; SOFT TISSUE at 10:38

## 2023-12-18 RX ADMIN — LIDOCAINE HYDROCHLORIDE ANHYDROUS 3 ML: 10 INJECTION, SOLUTION INFILTRATION at 10:38

## 2023-12-18 RX ADMIN — LIDOCAINE HYDROCHLORIDE 3 ML: 20 INJECTION, SOLUTION INFILTRATION; PERINEURAL at 10:39

## 2023-12-18 RX ADMIN — IOHEXOL 10 ML: 240 INJECTION, SOLUTION INTRATHECAL; INTRAVASCULAR; INTRAVENOUS; ORAL at 10:37

## 2024-01-08 PROCEDURE — RXMED WILLOW AMBULATORY MEDICATION CHARGE

## 2024-01-11 PROCEDURE — RXMED WILLOW AMBULATORY MEDICATION CHARGE

## 2024-01-17 ENCOUNTER — PHARMACY VISIT (OUTPATIENT)
Dept: PHARMACY | Facility: CLINIC | Age: 69
End: 2024-01-17
Payer: MEDICARE

## 2024-01-18 ENCOUNTER — PHARMACY VISIT (OUTPATIENT)
Dept: PHARMACY | Facility: CLINIC | Age: 69
End: 2024-01-18
Payer: MEDICARE

## 2024-01-18 PROCEDURE — RXMED WILLOW AMBULATORY MEDICATION CHARGE

## 2024-01-19 ENCOUNTER — PHARMACY VISIT (OUTPATIENT)
Dept: PHARMACY | Facility: CLINIC | Age: 69
End: 2024-01-19

## 2024-02-07 DIAGNOSIS — M48.062 LUMBAR STENOSIS WITH NEUROGENIC CLAUDICATION: ICD-10-CM

## 2024-02-07 DIAGNOSIS — I20.89 ANGINA DECUBITUS (CMS-HCC): Primary | ICD-10-CM

## 2024-02-07 DIAGNOSIS — F51.01 PRIMARY INSOMNIA: Primary | ICD-10-CM

## 2024-02-07 PROCEDURE — RXMED WILLOW AMBULATORY MEDICATION CHARGE

## 2024-02-07 RX ORDER — GABAPENTIN 300 MG/1
600 CAPSULE ORAL 3 TIMES DAILY
Qty: 180 CAPSULE | Refills: 3 | OUTPATIENT
Start: 2024-02-07 | End: 2024-06-06

## 2024-02-07 RX ORDER — TRAZODONE HYDROCHLORIDE 150 MG/1
150 TABLET ORAL NIGHTLY
Qty: 30 TABLET | Refills: 0 | Status: SHIPPED | OUTPATIENT
Start: 2024-02-07 | End: 2024-03-08

## 2024-02-07 RX ORDER — RANOLAZINE 500 MG/1
500 TABLET, EXTENDED RELEASE ORAL 2 TIMES DAILY
Qty: 60 TABLET | Refills: 4 | Status: SHIPPED | OUTPATIENT
Start: 2024-02-07 | End: 2025-02-06

## 2024-02-08 PROCEDURE — RXMED WILLOW AMBULATORY MEDICATION CHARGE

## 2024-02-15 DIAGNOSIS — I48.91 ATRIAL FIBRILLATION, UNSPECIFIED TYPE (MULTI): ICD-10-CM

## 2024-02-15 DIAGNOSIS — M48.062 LUMBAR STENOSIS WITH NEUROGENIC CLAUDICATION: ICD-10-CM

## 2024-02-15 PROCEDURE — RXMED WILLOW AMBULATORY MEDICATION CHARGE

## 2024-02-15 RX ORDER — CLOPIDOGREL BISULFATE 75 MG/1
75 TABLET ORAL DAILY
Qty: 90 TABLET | Refills: 3 | Status: SHIPPED | OUTPATIENT
Start: 2024-02-15 | End: 2024-05-31 | Stop reason: WASHOUT

## 2024-02-15 NOTE — TELEPHONE ENCOUNTER
Requested Prescriptions     Pending Prescriptions Disp Refills    clopidogrel (Plavix) 75 mg tablet 90 tablet 3     Sig: TAKE 1 TABLET BY MOUTH ONE TIME DAILY     Please send a refill of patient's medication as soon as possible.    Thank you.   '

## 2024-02-19 DIAGNOSIS — J44.9 COPD MIXED TYPE (MULTI): ICD-10-CM

## 2024-02-19 RX ORDER — ALBUTEROL SULFATE 90 UG/1
AEROSOL, METERED RESPIRATORY (INHALATION)
Qty: 6.7 G | Refills: 4 | Status: SHIPPED | OUTPATIENT
Start: 2024-02-19 | End: 2025-02-18

## 2024-02-20 ENCOUNTER — PHARMACY VISIT (OUTPATIENT)
Dept: PHARMACY | Facility: CLINIC | Age: 69
End: 2024-02-20
Payer: MEDICARE

## 2024-02-20 PROCEDURE — RXMED WILLOW AMBULATORY MEDICATION CHARGE

## 2024-02-20 RX ORDER — GABAPENTIN 300 MG/1
600 CAPSULE ORAL 3 TIMES DAILY
Qty: 180 CAPSULE | Refills: 3 | Status: SHIPPED | OUTPATIENT
Start: 2024-02-20 | End: 2024-06-19

## 2024-03-12 PROCEDURE — RXMED WILLOW AMBULATORY MEDICATION CHARGE

## 2024-03-13 DIAGNOSIS — J44.9 COPD MIXED TYPE (MULTI): ICD-10-CM

## 2024-03-14 PROCEDURE — RXMED WILLOW AMBULATORY MEDICATION CHARGE

## 2024-03-14 RX ORDER — FLUTICASONE FUROATE AND VILANTEROL 200; 25 UG/1; UG/1
1 POWDER RESPIRATORY (INHALATION) DAILY
Qty: 60 EACH | Refills: 3 | Status: SHIPPED | OUTPATIENT
Start: 2024-03-14 | End: 2025-03-14

## 2024-03-15 PROCEDURE — RXMED WILLOW AMBULATORY MEDICATION CHARGE

## 2024-03-18 ENCOUNTER — PHARMACY VISIT (OUTPATIENT)
Dept: PHARMACY | Facility: CLINIC | Age: 69
End: 2024-03-18
Payer: MEDICARE

## 2024-04-03 ENCOUNTER — PHARMACY VISIT (OUTPATIENT)
Dept: PHARMACY | Facility: CLINIC | Age: 69
End: 2024-04-03
Payer: MEDICARE

## 2024-04-03 PROCEDURE — RXMED WILLOW AMBULATORY MEDICATION CHARGE

## 2024-04-09 DIAGNOSIS — I21.4 NON-ST ELEVATION MI (NSTEMI) (MULTI): ICD-10-CM

## 2024-04-09 DIAGNOSIS — E78.2 MIXED HYPERLIPIDEMIA: ICD-10-CM

## 2024-04-09 NOTE — TELEPHONE ENCOUNTER
Pharmacy is requesting a refill on behalf of pt       Requested Prescriptions     Pending Prescriptions Disp Refills    evolocumab (Repatha SureClick) 140 mg/mL injection 2 mL 11     Sig: Inject 1 mL (140 mg) under the skin every 14 (fourteen) days.

## 2024-04-11 RX ORDER — EVOLOCUMAB 140 MG/ML
140 INJECTION, SOLUTION SUBCUTANEOUS
Qty: 2 ML | Refills: 11 | Status: SHIPPED | OUTPATIENT
Start: 2024-04-11 | End: 2025-04-06

## 2024-04-13 DIAGNOSIS — I10 PRIMARY HYPERTENSION: ICD-10-CM

## 2024-04-13 PROCEDURE — RXMED WILLOW AMBULATORY MEDICATION CHARGE

## 2024-04-15 PROCEDURE — RXMED WILLOW AMBULATORY MEDICATION CHARGE

## 2024-04-15 RX ORDER — METOPROLOL SUCCINATE 50 MG/1
50 TABLET, EXTENDED RELEASE ORAL DAILY
Qty: 90 TABLET | Refills: 3 | Status: SHIPPED | OUTPATIENT
Start: 2024-04-15 | End: 2025-04-10

## 2024-04-15 NOTE — TELEPHONE ENCOUNTER
Pharmacy is requesting a refill on behalf of the pt       Requested Prescriptions     Pending Prescriptions Disp Refills    metoprolol succinate XL (Toprol-XL) 50 mg 24 hr tablet 90 tablet 3     Sig: Take 1 tablet (50 mg) by mouth once daily.

## 2024-04-16 ENCOUNTER — APPOINTMENT (OUTPATIENT)
Dept: CARDIOLOGY | Facility: CLINIC | Age: 69
End: 2024-04-16
Payer: MEDICARE

## 2024-04-16 ENCOUNTER — PHARMACY VISIT (OUTPATIENT)
Dept: PHARMACY | Facility: CLINIC | Age: 69
End: 2024-04-16
Payer: MEDICARE

## 2024-04-24 PROCEDURE — RXMED WILLOW AMBULATORY MEDICATION CHARGE

## 2024-04-30 ENCOUNTER — PHARMACY VISIT (OUTPATIENT)
Dept: PHARMACY | Facility: CLINIC | Age: 69
End: 2024-04-30
Payer: MEDICARE

## 2024-05-13 ENCOUNTER — PHARMACY VISIT (OUTPATIENT)
Dept: PHARMACY | Facility: CLINIC | Age: 69
End: 2024-05-13
Payer: MEDICARE

## 2024-05-13 DIAGNOSIS — I21.4 NON-ST ELEVATION MI (NSTEMI) (MULTI): ICD-10-CM

## 2024-05-13 PROCEDURE — RXMED WILLOW AMBULATORY MEDICATION CHARGE

## 2024-05-15 ENCOUNTER — APPOINTMENT (OUTPATIENT)
Dept: CARDIOLOGY | Facility: CLINIC | Age: 69
End: 2024-05-15
Payer: MEDICARE

## 2024-05-16 ENCOUNTER — OFFICE VISIT (OUTPATIENT)
Dept: ORTHOPEDIC SURGERY | Facility: CLINIC | Age: 69
End: 2024-05-16
Payer: MEDICARE

## 2024-05-16 ENCOUNTER — HOSPITAL ENCOUNTER (OUTPATIENT)
Dept: RADIOLOGY | Facility: CLINIC | Age: 69
Discharge: HOME | End: 2024-05-16
Payer: MEDICARE

## 2024-05-16 DIAGNOSIS — G89.29 CHRONIC PAIN OF LEFT KNEE: ICD-10-CM

## 2024-05-16 DIAGNOSIS — M25.552 BILATERAL HIP PAIN: Primary | ICD-10-CM

## 2024-05-16 DIAGNOSIS — M25.562 CHRONIC PAIN OF LEFT KNEE: ICD-10-CM

## 2024-05-16 DIAGNOSIS — M17.12 ARTHRITIS OF LEFT KNEE: ICD-10-CM

## 2024-05-16 DIAGNOSIS — M25.551 BILATERAL HIP PAIN: Primary | ICD-10-CM

## 2024-05-16 PROCEDURE — 20610 DRAIN/INJ JOINT/BURSA W/O US: CPT | Performed by: ORTHOPAEDIC SURGERY

## 2024-05-16 PROCEDURE — 1159F MED LIST DOCD IN RCRD: CPT | Performed by: ORTHOPAEDIC SURGERY

## 2024-05-16 PROCEDURE — 1160F RVW MEDS BY RX/DR IN RCRD: CPT | Performed by: ORTHOPAEDIC SURGERY

## 2024-05-16 PROCEDURE — 99214 OFFICE O/P EST MOD 30 MIN: CPT | Performed by: ORTHOPAEDIC SURGERY

## 2024-05-16 PROCEDURE — 73564 X-RAY EXAM KNEE 4 OR MORE: CPT | Mod: LT

## 2024-05-16 RX ORDER — TRIAMCINOLONE ACETONIDE 40 MG/ML
1 INJECTION, SUSPENSION INTRA-ARTICULAR; INTRAMUSCULAR
Status: COMPLETED | OUTPATIENT
Start: 2024-05-16 | End: 2024-05-16

## 2024-05-16 RX ADMIN — TRIAMCINOLONE ACETONIDE 1 ML: 40 INJECTION, SUSPENSION INTRA-ARTICULAR; INTRAMUSCULAR at 10:26

## 2024-05-16 NOTE — PROGRESS NOTES
This is a consultation from Dr. Janna Lux, APRN-CNP for   Chief Complaint   Patient presents with    Left Knee - Pain   Bilateral hip pain    This is a 68 y.o. female who presents for evaluation of left knee pain and bilateral hip pain.  Patient states she had always issues for a long time, chronic problems been recent exacerbated.  Regarding her left knee she has sharp pain over the medial knee is worse with walking and weightbearing.  She has never had surgery on the knee.  No numbness or tingling no fevers or chills no shooting pain down the leg.  She has pain in both hips, mostly in the groin.  She had injections in the past which have been very helpful.  She is never had surgery in either hip.  No pain or numbness down either leg.    Physical Exam    There has been no interval change in this patient's past medical, surgical, medications, allergies, family history or social history since the most recent visit to a provider within our department. 14 point review of systems was performed, reviewed, and negative except for pertinent positives documented in the history of present illness.     Constitutional: well developed, well nourished female in no acute distress  Psychiatric: normal mood, appropriate affect  Eyes: sclera anicteric  HENT: normocephalic/atraumatic  CV: regular rate and rhythm   Respiratory: non labored breathing  Integumentary: no rash  Neurological: moves all extremities    Bilateral hip exam: skin normal, no abrasions, wounds, or lacerations. nttp over greater trochanter. negative log roll, negative reji's test. flexion to 90 degrees without pain. no pain with flexion abduction and external rotation, reproduction of hip and groin pain with flexion adduction and internal rotation. neurovascularly intact distally      Left knee exam: skin intact no lacerations or abrations. no effusion. ntt tender medial p joint line. negative log roll negative patellar grind. ROM 0-120. stable to varus  and valgus stress at 0 and 30 degrees. negative lachman negative posterior drawer negative timothy. 5/5 ehl/fhl/gs/ta. silt s/s/sp/dp/t. 2+ dp/pt      Xrays were ordered by me, they were reviewed and independently interpreted by me today, they show severe degenerative disease in both hips severe degenerative disease left knee    L Inj/Asp: L knee on 5/16/2024 10:26 AM  Indications: pain and joint swelling  Details: 22 G needle, anterolateral approach  Medications: 1 mL triamcinolone acetonide 40 mg/mL    Discussion:  I discussed the conservative treatment options for knee osteoarthritis including but not limited to physical therapy, oral NSAIDS, activity and lifestyle modification, and corticosteroid injections. Pt has elected to undergo a cortisone injection today. I have explained the risk and benefits of an injection including the possibility of joint infection, bleeding, damage to cartilage, allergic reaction. Patient verbalized understanding and gave verbal consent wishes to proceed with a intra-articular cortisone injection for their knee.    Procedure:  After discussing the risk and benefits of the procedure, we proceeded with an intra-articular left knee injection. We discussed the risks and benefits and potential morbidity related to the treatment, and to the prescription medication administered in the injection    With the patient's informed verbal consent, the left knee was prepped in standard sterile fashion with Chlorhexidine. The skin was then anesthetized with ethyl chloride spray and cleaned again with Chlorhexidine. The knee was then apirated/injected with a prefilled 20-gauge syringe of 40 mg Kenalog + 4 ml Lidocaine using the lateral approach without complications.  The patient tolerated this well and felt immediate initial relief of symptoms. A bandaid was applied and the patient ambulated out of the clinic on ther own accord without difficulty. Patient was instructed to avoid physical activity  for 24-48 hours to prevent the knees from swelling and may ice the knees as tolerated. Patient should contact the office if any signs of of infection appear: redness, fever, chills, drainage, swelling or warmth to the knees.  Pt understands that the injections can be repeated no sooner than 3 months.  Procedure, treatment alternatives, risks and benefits explained, specific risks discussed. Consent was given by the patient. Immediately prior to procedure a time out was called to verify the correct patient, procedure, equipment, support staff and site/side marked as required. Patient was prepped and draped in the usual sterile fashion.             Impression/Plan: This is a 68 y.o. female with severe left knee and bilateral hip arthritis.  I had an in depth discussion with the patient regarding treatment options for arthritis and their relative risks and benefits. We reviewed surgical and nonsurgical option for treatment. Treatments include anti inflammatory medications, physical therapy, weight loss, activity modification, use of assistive devices, injection therapies. We discussed current prescriptions and risks and benefits of continuation of prescription medication as apporpriate. We discussed that arthritis is often progressive over time, an in end stage arthritis surgical interventions can be considered, including arthroplasty. All questions were answered and the patient voiced their understanding.  Will plan continued nonsurgical treatment for this given her BMI.  Will send her for bilateral hip injections.  I will see her back as needed        BMI Readings from Last 1 Encounters:   09/22/23 49.25 kg/m²      Lab Results   Component Value Date    CREATININE 0.7 09/01/2023     Tobacco Use: Not on file (12/29/2017)      Computed MELD 3.0 unavailable. One or more values for this score either were not found within the given timeframe or did not fit some other criterion.  Computed MELD-Na unavailable. One or more  "values for this score either were not found within the given timeframe or did not fit some other criterion.       Lab Results   Component Value Date    HGBA1C 8.6 (H) 09/01/2023     No results found for: \"STAPHMRSASCR\"  "

## 2024-05-18 PROCEDURE — RXMED WILLOW AMBULATORY MEDICATION CHARGE

## 2024-05-19 PROBLEM — Z85.42 PERSONAL HISTORY OF MALIGNANT NEOPLASM OF OTHER PARTS OF UTERUS: Status: ACTIVE | Noted: 2022-04-08

## 2024-05-19 PROBLEM — Z95.5 PRESENCE OF CORONARY ANGIOPLASTY IMPLANT AND GRAFT: Status: ACTIVE | Noted: 2022-04-08

## 2024-05-19 PROBLEM — N39.0 URINARY TRACT INFECTION: Status: ACTIVE | Noted: 2022-09-09

## 2024-05-19 PROBLEM — Z96.659 HISTORY OF TOTAL KNEE REPLACEMENT: Status: ACTIVE | Noted: 2024-05-19

## 2024-05-19 PROBLEM — M62.81 MUSCLE WEAKNESS (GENERALIZED): Status: ACTIVE | Noted: 2022-04-08

## 2024-05-19 PROBLEM — I21.4 ACUTE NON-ST ELEVATION MYOCARDIAL INFARCTION (NSTEMI) (MULTI): Status: ACTIVE | Noted: 2024-05-19

## 2024-05-19 PROBLEM — C54.1 ENDOMETRIAL CANCER (MULTI): Status: ACTIVE | Noted: 2024-05-19

## 2024-05-19 PROBLEM — R48.8 OTHER SYMBOLIC DYSFUNCTIONS: Status: ACTIVE | Noted: 2022-04-08

## 2024-05-21 ENCOUNTER — PHARMACY VISIT (OUTPATIENT)
Dept: PHARMACY | Facility: CLINIC | Age: 69
End: 2024-05-21
Payer: MEDICARE

## 2024-05-21 PROCEDURE — RXMED WILLOW AMBULATORY MEDICATION CHARGE

## 2024-05-30 ENCOUNTER — PHARMACY VISIT (OUTPATIENT)
Dept: PHARMACY | Facility: CLINIC | Age: 69
End: 2024-05-30
Payer: MEDICARE

## 2024-05-31 ENCOUNTER — PHARMACY VISIT (OUTPATIENT)
Dept: PHARMACY | Facility: CLINIC | Age: 69
End: 2024-05-31
Payer: MEDICARE

## 2024-05-31 ENCOUNTER — OFFICE VISIT (OUTPATIENT)
Dept: CARDIOLOGY | Facility: CLINIC | Age: 69
End: 2024-05-31
Payer: MEDICARE

## 2024-05-31 VITALS
BODY MASS INDEX: 44.8 KG/M2 | SYSTOLIC BLOOD PRESSURE: 112 MMHG | HEART RATE: 65 BPM | HEIGHT: 64 IN | OXYGEN SATURATION: 98 % | TEMPERATURE: 98.6 F | RESPIRATION RATE: 18 BRPM | WEIGHT: 262.4 LBS | DIASTOLIC BLOOD PRESSURE: 81 MMHG

## 2024-05-31 DIAGNOSIS — I20.9 ANGINA PECTORIS (CMS-HCC): ICD-10-CM

## 2024-05-31 DIAGNOSIS — I25.10 CORONARY ARTERIOSCLEROSIS: ICD-10-CM

## 2024-05-31 DIAGNOSIS — I21.4 NON-ST ELEVATION MI (NSTEMI) (MULTI): ICD-10-CM

## 2024-05-31 DIAGNOSIS — E78.2 MIXED HYPERLIPIDEMIA: ICD-10-CM

## 2024-05-31 DIAGNOSIS — Z95.5 PRESENCE OF CORONARY ANGIOPLASTY IMPLANT AND GRAFT: Primary | ICD-10-CM

## 2024-05-31 PROCEDURE — 93000 ELECTROCARDIOGRAM COMPLETE: CPT | Performed by: INTERNAL MEDICINE

## 2024-05-31 PROCEDURE — 1036F TOBACCO NON-USER: CPT | Performed by: INTERNAL MEDICINE

## 2024-05-31 PROCEDURE — 3079F DIAST BP 80-89 MM HG: CPT | Performed by: INTERNAL MEDICINE

## 2024-05-31 PROCEDURE — 1160F RVW MEDS BY RX/DR IN RCRD: CPT | Performed by: INTERNAL MEDICINE

## 2024-05-31 PROCEDURE — 3074F SYST BP LT 130 MM HG: CPT | Performed by: INTERNAL MEDICINE

## 2024-05-31 PROCEDURE — 99214 OFFICE O/P EST MOD 30 MIN: CPT | Performed by: INTERNAL MEDICINE

## 2024-05-31 PROCEDURE — 1159F MED LIST DOCD IN RCRD: CPT | Performed by: INTERNAL MEDICINE

## 2024-05-31 PROCEDURE — 1126F AMNT PAIN NOTED NONE PRSNT: CPT | Performed by: INTERNAL MEDICINE

## 2024-05-31 PROCEDURE — RXMED WILLOW AMBULATORY MEDICATION CHARGE

## 2024-05-31 RX ORDER — EZETIMIBE 10 MG/1
10 TABLET ORAL DAILY
Qty: 90 TABLET | Refills: 3 | Status: SHIPPED | OUTPATIENT
Start: 2024-05-31 | End: 2025-05-31

## 2024-05-31 RX ORDER — DICLOFENAC SODIUM 10 MG/G
4 GEL TOPICAL 2 TIMES DAILY PRN
COMMUNITY

## 2024-05-31 ASSESSMENT — ENCOUNTER SYMPTOMS
DEPRESSION: 0
OCCASIONAL FEELINGS OF UNSTEADINESS: 1
LOSS OF SENSATION IN FEET: 0

## 2024-05-31 ASSESSMENT — LIFESTYLE VARIABLES
AUDIT-C TOTAL SCORE: 0
HOW OFTEN DO YOU HAVE SIX OR MORE DRINKS ON ONE OCCASION: NEVER
SKIP TO QUESTIONS 9-10: 1
HOW MANY STANDARD DRINKS CONTAINING ALCOHOL DO YOU HAVE ON A TYPICAL DAY: PATIENT DOES NOT DRINK
HOW OFTEN DO YOU HAVE A DRINK CONTAINING ALCOHOL: NEVER

## 2024-05-31 ASSESSMENT — PAIN SCALES - GENERAL: PAINLEVEL: 0-NO PAIN

## 2024-05-31 ASSESSMENT — PATIENT HEALTH QUESTIONNAIRE - PHQ9
1. LITTLE INTEREST OR PLEASURE IN DOING THINGS: NOT AT ALL
SUM OF ALL RESPONSES TO PHQ9 QUESTIONS 1 AND 2: 0
2. FEELING DOWN, DEPRESSED OR HOPELESS: NOT AT ALL

## 2024-05-31 NOTE — PROGRESS NOTES
History of present illness:  This is a very pleasant 69-year-old female with history of coronary disease status post PCI to RCA in 2021, history of pulmonary embolism on Eliquis, severe hyperlipidemia, obesity and hypertension.  Patient returns to my office for follow-up.  Feeling overall good.  Denies having any chest pain shortness of breath palpitations dizziness or lightheadedness.      Past Medical History:   Diagnosis Date    Acute non-ST elevation myocardial infarction (NSTEMI) (Multi) 05/19/2024    Angina pectoris (CMS-[facility]) 10/11/2023    Atrial fibrillation (Multi) 10/11/2023    Benign essential hypertension 09/04/2019    Chest discomfort 10/11/2023    Chronic obstructive pulmonary disease (Multi) 12/30/2018    CKD (chronic kidney disease) 09/09/2022    Coronary arteriosclerosis 10/11/2023    Edema 10/11/2023    Electrocardiogram abnormal 10/11/2023    HTN (hypertension) 12/30/2018    Mixed hyperlipidemia 12/30/2018    PE (pulmonary thromboembolism) (Multi) 10/20/2021    Presence of coronary angioplasty implant and graft 04/08/2022    Type 2 diabetes mellitus without complications (Multi) 12/11/2018       Past Surgical History:   Procedure Laterality Date    FL GUIDED ASPIRATION OR INJECTION LARGE JOINT LEFT Left 12/18/2023    FL GUIDED ASPIRATION OR INJECTION LARGE JOINT LEFT 12/18/2023 Ralph Triplett [facility]    FL GUIDED ASPIRATION OR INJECTION LARGE JOINT RIGHT Right 12/14/2023    FL GUIDED ASPIRATION OR INJECTION LARGE JOINT RIGHT 12/14/2023 Ralph Triplett MD [facility]       Allergies   Allergen Reactions    Penicillins Swelling    Penicillamine Unknown        reports that she quit smoking about 26 years ago. Her smoking use included cigarettes. She has been exposed to tobacco smoke. She has never used smokeless tobacco. She reports that she does not drink alcohol and does not use drugs.    Family History   Family history unknown: Yes       Patient's Medications   New Prescriptions     EZETIMIBE (ZETIA) 10 MG TABLET    Take 1 tablet (10 mg) by mouth once daily.   Previous Medications    ALBUTEROL 90 MCG/ACTUATION INHALER    INHALE 2 PUFFS BY MOUTH FOUR TIMES A DAY AS NEEDED FOR WHEEZING    AMMONIUM LACTATE (LAC-HYDRIN) 12 % LOTION    See Instructions, Instructions: APPLY TOPICALLY TO THE AFFECTED AREA(S) EVERY DAY, # 225 unknown unit, Type: Soft Stop, Pharmacy: [id number], APPLY TOPICALLY TO THE AFFECTED AREA(S) EVERY DAY    APIXABAN (ELIQUIS) 5 MG TABLET    TAKE 1 TABLET BY MOUTH TWO TIMES A DAY    DICLOFENAC SODIUM (VOLTAREN) 1 % GEL    Apply 4.5 inches (4 g) topically 2 times a day as needed.    EVOLOCUMAB (REPATHA SURECLICK) 140 MG/ML INJECTION    Inject 1 mL (140 mg) under the skin every 14 (fourteen) days.    FLUTICASONE FUROATE-VILANTEROL (BREO ELLIPTA) 200-25 MCG/DOSE INHALER    INHALE 1 PUFF BY MOUTH ONE TIME DAILY    GABAPENTIN (NEURONTIN) 300 MG CAPSULE    Take 2 capsules (600 mg) by mouth 3 times a day.    INSULIN REGULAR (NOVOLIN R REGULAR U100 INSULIN) 100 UNIT/ML INJECTION    GIVE SUBCUTANEOUSLY BEFORE MEALS AND BED ACCORDING TO SLIDING SCALE:<150: 0 UNITS; 151-200:4 UNITS; 201-250: 8 UNITS; 251-300: 10 UNITS; 301-500: 12 UNITS; 351-400: 14 UNITS; >400: 16 UNITS AND CALL OFFICE    METOPROLOL SUCCINATE XL (TOPROL-XL) 50 MG 24 HR TABLET    Take 1 tablet (50 mg) by mouth once daily.    NOVOLIN N NPH U-100 INSULIN 100 UNIT/ML INJECTION    See Instructions, Instructions: INJECT 20 UNITS SUBCUTANEOUSLY IN THE MORNING AND THEN 15 UNITS IN THE EVENING, # 10 mL, 5 Refill(s), Type: Maintenance, Pharmacy: [id number], INJECT 20 UNITS SUBCUTANEOUSLY IN THE MORNING AND THEN 15 UNITS IN THE EVENING, 64, in, 02/20/23 12:47:00 EST, Height Measured, 266, lb, 09/09/22 9:49:00 EDT, Weight Measured    OMEGA-3 ACID ETHYL ESTERS (LOVAZA) 1 GRAM CAPSULE    Omega-3-acid Ethyl Esters 1 GM Oral Capsule   Quantity: 30  Refills: 0        Start : 4-Apr-2022   Active    OMEPRAZOLE (PRILOSEC)  40 MG DR CAPSULE    Take 1 capsule (40 mg) by mouth once daily.    RANOLAZINE (RANEXA) 500 MG 12 HR TABLET    TAKE 1 TABLET BY MOUTH TWO TIMES A DAY    SPIRONOLACTONE (ALDACTONE) 25 MG TABLET    Take 1 tablet (25 mg) by mouth once daily.   Modified Medications    No medications on file   Discontinued Medications    ALBUTEROL 2.5 MG /3 ML (0.083 %) NEBULIZER SOLUTION    Take 3 mL (2.5 mg) by nebulization every 6 hours.    ATORVASTATIN (LIPITOR) 80 MG TABLET    Take 1 tablet (80 mg) by mouth once daily.    CLOPIDOGREL (PLAVIX) 75 MG TABLET    TAKE 1 TABLET BY MOUTH ONE TIME DAILY    DICYCLOMINE (BENTYL) 10 MG CAPSULE    Take 1 capsule (10 mg) by mouth 3 times a day as needed (for pain).    EMPAGLIFLOZIN (JARDIANCE) 10 MG    Jardiance 10 MG Oral Tablet   Quantity: 30  Refills: 0        Start : 17-May-2022   Active    FISH OIL CONCENTRATE (OMEGA-3) 120-180 MG CAPSULE    Take 1 capsule (1 g) by mouth once daily.    FUROSEMIDE (LASIX) 40 MG TABLET    Take 1 tablet (40 mg) by mouth once daily.    INSULIN NPH AND REGULAR HUMAN (NOVOLIN 70/30 U-100 INSULIN) 100 UNIT/ML (70-30) INJECTION    Inject 20 Units under the skin once daily at bedtime.    INSULIN NPH, ISOPHANE, (HUMULIN N NPH U-100 INSULIN) 100 UNIT/ML INJECTION    Inject 40 u q AM and 20 u before dinner    LISINOPRIL-HYDROCHLOROTHIAZIDE 10-12.5 MG TABLET    Take 1 tablet by mouth once daily.    METOPROLOL SUCCINATE XL (TOPROL-XL) 25 MG 24 HR TABLET    Take 1 tablet (25 mg) by mouth once daily.    OMEPRAZOLE (PRILOSEC) 20 MG DR CAPSULE    Take 1 capsule (20 mg) by mouth once daily.    SEA-OMEGA 200 MG-300 MG- 100 MG-1,000 MG CAPSULE    Take 1 capsule (1,000 mg) by mouth once daily.       Objective   Physical Exam  General: Patient in no acute distress   HEENT: Atraumatic normocephalic.  Neck: Supple, jugular venous pressure within normal limit.  No bruits  Lungs: Clear to auscultation bilaterally  Cardiovascular: Regular rate and rhythm, normal heart sounds, no  murmurs rubs or gallops  Abdomen: Soft nontender nondistended.  Normal bowel sounds.  Extremities: Warm to touch, no edema.      Lab Review   No visits with results within 2 Month(s) from this visit.   Latest known visit with results is:   [facility] Encounter on 09/22/2023   Component Date Value    LH - Leukocytes Urine Di* 09/22/2023 Negative     LH - Nitrite Urine Dipst* 09/22/2023 Negative     LH - Urobilinogen Urine * 09/22/2023 0.2 mg/dl     LH - Protein Urine Dipst* 09/22/2023 Negative     LH - pH Urine Dipstick (* 09/22/2023 5     LH - Blood Urine Dipstic* 09/22/2023 Negative     LH - Specific Gravity Ur* 09/22/2023 1.020     LH - Ketones Urine Dipst* 09/22/2023 Negative     LH - Bilirubin Urine Dip* 09/22/2023 Negative     LH - Glucose Urine Dipst* 09/22/2023 100 mg/dl     LH - Urine Color Urine D* 09/22/2023 Yellow     LH - Urine Appearance Ur* 09/22/2023 Clear         Assessment/Plan   Patient Active Problem List   Diagnosis    Benign essential hypertension    Diabetes mellitus (Multi)    Chronic pain of left knee    Edema    Chest discomfort    Angina pectoris (CMS-HCC)    Non-ST elevation MI (NSTEMI) (Multi)    Mixed hyperlipidemia    Hyperlipidemia    Syncope    Primary insomnia    Osteoarthritis    Obesity    Morbidly obese (Multi)    Nontoxic single thyroid nodule    Hypoxia    HTN (hypertension)    Elevated white blood cell count, unspecified    Electrocardiogram abnormal    Dyspnea    Type 2 diabetes mellitus without complications (Multi)    Diabetes mellitus type 2 in obese    Coronary arteriosclerosis    CKD (chronic kidney disease)    COPD mixed type (Multi)    Chronic obstructive pulmonary disease (Multi)    Blood glucose abnormal    Atrial fibrillation (Multi)    PE (pulmonary thromboembolism) (Multi)    Abnormal results of liver function studies    Spondylolisthesis at L4-L5 level    Lumbar stenosis with neurogenic claudication    Lumbar pain    Dorsalgia    Difficulty walking    Arthritis of  knee, left    Arthritis of left hip    Acute non-ST elevation myocardial infarction (NSTEMI) (Multi)    Endometrial cancer (Multi)    History of total knee replacement    Muscle weakness (generalized)    Other symbolic dysfunctions    Personal history of malignant neoplasm of other parts of uterus    Presence of coronary angioplasty implant and graft    Urinary tract infection      This is a very pleasant 69-year-old female with history of coronary disease status post PCI to RCA in 2021, history of pulmonary embolism on Eliquis, severe hyperlipidemia, obesity and hypertension.  Patient returns to my office for follow-up.  Feeling overall good.  Denies having any chest pain shortness of breath palpitations dizziness or lightheadedness.  Her cholesterol is much more controlled on Repatha.  At this point I discussed with her weight loss.  Will add ezetimibe to control better her LDL.  She is intolerant to statin.  We will also stop clopidogrel since she had her stent more than 2 years ago and she will be on baby aspirin every third day and addition of Eliquis.  Discussed with her lifestyle modification weight loss.  Will follow-up in 6 months.      Opal Esteban MD

## 2024-05-31 NOTE — PATIENT INSTRUCTIONS
Stop clopidogrel.  Take baby aspirin 81 mg every third day.  Continue Eliquis.  Start ezetimibe 10 mg oral daily for better cholesterol control I will see you in 6 months.

## 2024-06-10 DIAGNOSIS — M48.062 LUMBAR STENOSIS WITH NEUROGENIC CLAUDICATION: ICD-10-CM

## 2024-06-10 PROCEDURE — RXMED WILLOW AMBULATORY MEDICATION CHARGE

## 2024-06-10 RX ORDER — GABAPENTIN 300 MG/1
600 CAPSULE ORAL 3 TIMES DAILY
Qty: 180 CAPSULE | Refills: 3 | OUTPATIENT
Start: 2024-06-10 | End: 2024-10-08

## 2024-06-12 ENCOUNTER — PHARMACY VISIT (OUTPATIENT)
Dept: PHARMACY | Facility: CLINIC | Age: 69
End: 2024-06-12
Payer: MEDICARE

## 2024-06-18 ENCOUNTER — HOSPITAL ENCOUNTER (OUTPATIENT)
Dept: RADIOLOGY | Facility: HOSPITAL | Age: 69
Discharge: HOME | End: 2024-06-18
Payer: MEDICARE

## 2024-06-18 DIAGNOSIS — M25.551 BILATERAL HIP PAIN: ICD-10-CM

## 2024-06-18 DIAGNOSIS — M25.552 BILATERAL HIP PAIN: ICD-10-CM

## 2024-06-18 PROCEDURE — 20610 DRAIN/INJ JOINT/BURSA W/O US: CPT | Mod: RIGHT SIDE | Performed by: RADIOLOGY

## 2024-06-18 PROCEDURE — 2500000004 HC RX 250 GENERAL PHARMACY W/ HCPCS (ALT 636 FOR OP/ED): Performed by: ORTHOPAEDIC SURGERY

## 2024-06-18 PROCEDURE — 2500000005 HC RX 250 GENERAL PHARMACY W/O HCPCS: Performed by: RADIOLOGY

## 2024-06-18 PROCEDURE — 77002 NEEDLE LOCALIZATION BY XRAY: CPT | Mod: RT

## 2024-06-18 PROCEDURE — 2550000001 HC RX 255 CONTRASTS: Performed by: ORTHOPAEDIC SURGERY

## 2024-06-18 PROCEDURE — 77002 NEEDLE LOCALIZATION BY XRAY: CPT | Mod: RIGHT SIDE | Performed by: RADIOLOGY

## 2024-06-18 PROCEDURE — 96372 THER/PROPH/DIAG INJ SC/IM: CPT | Performed by: ORTHOPAEDIC SURGERY

## 2024-06-18 RX ORDER — LIDOCAINE HYDROCHLORIDE 10 MG/ML
5 INJECTION, SOLUTION EPIDURAL; INFILTRATION; INTRACAUDAL; PERINEURAL ONCE
Status: COMPLETED | OUTPATIENT
Start: 2024-06-18 | End: 2024-06-18

## 2024-06-18 RX ORDER — BUPIVACAINE HYDROCHLORIDE 5 MG/ML
10 INJECTION, SOLUTION EPIDURAL; INTRACAUDAL ONCE
Status: COMPLETED | OUTPATIENT
Start: 2024-06-18 | End: 2024-06-18

## 2024-06-18 RX ORDER — TRIAMCINOLONE ACETONIDE 40 MG/ML
40 INJECTION, SUSPENSION INTRA-ARTICULAR; INTRAMUSCULAR ONCE
Status: COMPLETED | OUTPATIENT
Start: 2024-06-18 | End: 2024-06-18

## 2024-06-19 DIAGNOSIS — M48.062 LUMBAR STENOSIS WITH NEUROGENIC CLAUDICATION: ICD-10-CM

## 2024-06-20 ENCOUNTER — PHARMACY VISIT (OUTPATIENT)
Dept: PHARMACY | Facility: CLINIC | Age: 69
End: 2024-06-20
Payer: MEDICARE

## 2024-06-20 DIAGNOSIS — M48.062 LUMBAR STENOSIS WITH NEUROGENIC CLAUDICATION: ICD-10-CM

## 2024-06-20 PROCEDURE — RXMED WILLOW AMBULATORY MEDICATION CHARGE

## 2024-06-20 RX ORDER — GABAPENTIN 300 MG/1
600 CAPSULE ORAL 3 TIMES DAILY
Qty: 180 CAPSULE | Refills: 3 | OUTPATIENT
Start: 2024-06-20 | End: 2024-10-18

## 2024-06-20 RX ORDER — GABAPENTIN 300 MG/1
600 CAPSULE ORAL 3 TIMES DAILY
Qty: 180 CAPSULE | Refills: 3 | Status: SHIPPED | OUTPATIENT
Start: 2024-06-20 | End: 2024-10-18

## 2024-06-21 ENCOUNTER — PHARMACY VISIT (OUTPATIENT)
Dept: PHARMACY | Facility: CLINIC | Age: 69
End: 2024-06-21

## 2024-06-23 ENCOUNTER — HOSPITAL ENCOUNTER (EMERGENCY)
Facility: HOSPITAL | Age: 69
Discharge: HOME | End: 2024-06-23
Attending: EMERGENCY MEDICINE
Payer: MEDICARE

## 2024-06-23 ENCOUNTER — APPOINTMENT (OUTPATIENT)
Dept: CARDIOLOGY | Facility: HOSPITAL | Age: 69
End: 2024-06-23
Payer: MEDICARE

## 2024-06-23 ENCOUNTER — APPOINTMENT (OUTPATIENT)
Dept: RADIOLOGY | Facility: HOSPITAL | Age: 69
End: 2024-06-23
Payer: MEDICARE

## 2024-06-23 VITALS
WEIGHT: 260.14 LBS | HEART RATE: 82 BPM | DIASTOLIC BLOOD PRESSURE: 84 MMHG | BODY MASS INDEX: 44.41 KG/M2 | TEMPERATURE: 98.2 F | OXYGEN SATURATION: 93 % | HEIGHT: 64 IN | SYSTOLIC BLOOD PRESSURE: 139 MMHG | RESPIRATION RATE: 18 BRPM

## 2024-06-23 DIAGNOSIS — R06.02 SHORTNESS OF BREATH: ICD-10-CM

## 2024-06-23 DIAGNOSIS — J44.1 COPD EXACERBATION (MULTI): Primary | ICD-10-CM

## 2024-06-23 LAB
ALBUMIN SERPL-MCNC: 3.4 G/DL (ref 3.5–5)
ALP BLD-CCNC: 104 U/L (ref 35–125)
ALT SERPL-CCNC: 17 U/L (ref 5–40)
ANION GAP SERPL CALC-SCNC: 14 MMOL/L
AST SERPL-CCNC: 12 U/L (ref 5–40)
BASOPHILS # BLD AUTO: 0.04 X10*3/UL (ref 0–0.1)
BASOPHILS NFR BLD AUTO: 0.2 %
BILIRUB SERPL-MCNC: 0.8 MG/DL (ref 0.1–1.2)
BUN SERPL-MCNC: 15 MG/DL (ref 8–25)
CALCIUM SERPL-MCNC: 9.1 MG/DL (ref 8.5–10.4)
CHLORIDE SERPL-SCNC: 98 MMOL/L (ref 97–107)
CO2 SERPL-SCNC: 25 MMOL/L (ref 24–31)
CREAT SERPL-MCNC: 0.7 MG/DL (ref 0.4–1.6)
EGFRCR SERPLBLD CKD-EPI 2021: >90 ML/MIN/1.73M*2
EOSINOPHIL # BLD AUTO: 0.02 X10*3/UL (ref 0–0.7)
EOSINOPHIL NFR BLD AUTO: 0.1 %
ERYTHROCYTE [DISTWIDTH] IN BLOOD BY AUTOMATED COUNT: 13.6 % (ref 11.5–14.5)
GLUCOSE SERPL-MCNC: 205 MG/DL (ref 65–99)
HCT VFR BLD AUTO: 47.6 % (ref 36–46)
HGB BLD-MCNC: 15.7 G/DL (ref 12–16)
IMM GRANULOCYTES # BLD AUTO: 0.31 X10*3/UL (ref 0–0.7)
IMM GRANULOCYTES NFR BLD AUTO: 1.7 % (ref 0–0.9)
LYMPHOCYTES # BLD AUTO: 3.06 X10*3/UL (ref 1.2–4.8)
LYMPHOCYTES NFR BLD AUTO: 17 %
MCH RBC QN AUTO: 30.1 PG (ref 26–34)
MCHC RBC AUTO-ENTMCNC: 33 G/DL (ref 32–36)
MCV RBC AUTO: 91 FL (ref 80–100)
MONOCYTES # BLD AUTO: 1.41 X10*3/UL (ref 0.1–1)
MONOCYTES NFR BLD AUTO: 7.8 %
NEUTROPHILS # BLD AUTO: 13.19 X10*3/UL (ref 1.2–7.7)
NEUTROPHILS NFR BLD AUTO: 73.2 %
NRBC BLD-RTO: 0 /100 WBCS (ref 0–0)
NT-PROBNP SERPL-MCNC: 1665 PG/ML (ref 0–353)
PLATELET # BLD AUTO: 261 X10*3/UL (ref 150–450)
POTASSIUM SERPL-SCNC: 4.1 MMOL/L (ref 3.4–5.1)
PROT SERPL-MCNC: 6.9 G/DL (ref 5.9–7.9)
RBC # BLD AUTO: 5.22 X10*6/UL (ref 4–5.2)
SARS-COV-2 RNA RESP QL NAA+PROBE: NOT DETECTED
SODIUM SERPL-SCNC: 137 MMOL/L (ref 133–145)
TROPONIN T SERPL-MCNC: 14 NG/L
TROPONIN T SERPL-MCNC: 9 NG/L
WBC # BLD AUTO: 18 X10*3/UL (ref 4.4–11.3)

## 2024-06-23 PROCEDURE — 87635 SARS-COV-2 COVID-19 AMP PRB: CPT | Performed by: EMERGENCY MEDICINE

## 2024-06-23 PROCEDURE — 36415 COLL VENOUS BLD VENIPUNCTURE: CPT | Performed by: EMERGENCY MEDICINE

## 2024-06-23 PROCEDURE — 85025 COMPLETE CBC W/AUTO DIFF WBC: CPT | Performed by: EMERGENCY MEDICINE

## 2024-06-23 PROCEDURE — 83880 ASSAY OF NATRIURETIC PEPTIDE: CPT | Performed by: EMERGENCY MEDICINE

## 2024-06-23 PROCEDURE — 2500000004 HC RX 250 GENERAL PHARMACY W/ HCPCS (ALT 636 FOR OP/ED): Performed by: EMERGENCY MEDICINE

## 2024-06-23 PROCEDURE — 96374 THER/PROPH/DIAG INJ IV PUSH: CPT | Performed by: EMERGENCY MEDICINE

## 2024-06-23 PROCEDURE — 80053 COMPREHEN METABOLIC PANEL: CPT | Performed by: EMERGENCY MEDICINE

## 2024-06-23 PROCEDURE — 84484 ASSAY OF TROPONIN QUANT: CPT | Mod: 91 | Performed by: EMERGENCY MEDICINE

## 2024-06-23 PROCEDURE — 71045 X-RAY EXAM CHEST 1 VIEW: CPT

## 2024-06-23 PROCEDURE — 71045 X-RAY EXAM CHEST 1 VIEW: CPT | Performed by: RADIOLOGY

## 2024-06-23 PROCEDURE — 2500000002 HC RX 250 W HCPCS SELF ADMINISTERED DRUGS (ALT 637 FOR MEDICARE OP, ALT 636 FOR OP/ED): Performed by: EMERGENCY MEDICINE

## 2024-06-23 PROCEDURE — 94640 AIRWAY INHALATION TREATMENT: CPT

## 2024-06-23 PROCEDURE — 2500000001 HC RX 250 WO HCPCS SELF ADMINISTERED DRUGS (ALT 637 FOR MEDICARE OP): Performed by: EMERGENCY MEDICINE

## 2024-06-23 PROCEDURE — 84484 ASSAY OF TROPONIN QUANT: CPT | Performed by: EMERGENCY MEDICINE

## 2024-06-23 PROCEDURE — 99284 EMERGENCY DEPT VISIT MOD MDM: CPT | Mod: 25 | Performed by: EMERGENCY MEDICINE

## 2024-06-23 PROCEDURE — 93005 ELECTROCARDIOGRAM TRACING: CPT

## 2024-06-23 RX ORDER — ASPIRIN 325 MG
325 TABLET ORAL ONCE
Status: COMPLETED | OUTPATIENT
Start: 2024-06-23 | End: 2024-06-23

## 2024-06-23 RX ORDER — IPRATROPIUM BROMIDE AND ALBUTEROL SULFATE 2.5; .5 MG/3ML; MG/3ML
3 SOLUTION RESPIRATORY (INHALATION) ONCE
Status: COMPLETED | OUTPATIENT
Start: 2024-06-23 | End: 2024-06-23

## 2024-06-23 RX ORDER — METHYLPREDNISOLONE 4 MG/1
TABLET ORAL
Qty: 21 TABLET | Refills: 0 | Status: SHIPPED | OUTPATIENT
Start: 2024-06-23 | End: 2024-06-30

## 2024-06-23 ASSESSMENT — PAIN - FUNCTIONAL ASSESSMENT: PAIN_FUNCTIONAL_ASSESSMENT: 0-10

## 2024-06-23 ASSESSMENT — PAIN SCALES - GENERAL
PAINLEVEL_OUTOF10: 0 - NO PAIN
PAINLEVEL_OUTOF10: 0 - NO PAIN

## 2024-06-23 ASSESSMENT — COLUMBIA-SUICIDE SEVERITY RATING SCALE - C-SSRS
6. HAVE YOU EVER DONE ANYTHING, STARTED TO DO ANYTHING, OR PREPARED TO DO ANYTHING TO END YOUR LIFE?: NO
2. HAVE YOU ACTUALLY HAD ANY THOUGHTS OF KILLING YOURSELF?: NO
1. IN THE PAST MONTH, HAVE YOU WISHED YOU WERE DEAD OR WISHED YOU COULD GO TO SLEEP AND NOT WAKE UP?: NO

## 2024-06-23 NOTE — ED PROVIDER NOTES
HPI   Chief Complaint   Patient presents with    Shortness of Breath     Patient bib EMS from home for SOB and productive cough x 3/4 days. Hx of COPD, stents, PE. Takes eliquis. Called her primary and primary told her to take mucinex and if it didn't get better to come been seen.        HPI                    Chidi Coma Scale Score: 15                     Patient History   Past Medical History:   Diagnosis Date    Acute non-ST elevation myocardial infarction (NSTEMI) (Multi) 2024    Angina pectoris (CMS-HCC) 10/11/2023    Atrial fibrillation (Multi) 10/11/2023    Benign essential hypertension 2019    Chest discomfort 10/11/2023    Chronic obstructive pulmonary disease (Multi) 2018    CKD (chronic kidney disease) 2022    Coronary arteriosclerosis 10/11/2023    Edema 10/11/2023    Electrocardiogram abnormal 10/11/2023    HTN (hypertension) 2018    Mixed hyperlipidemia 2018    PE (pulmonary thromboembolism) (Multi) 10/20/2021    Presence of coronary angioplasty implant and graft 2022    Type 2 diabetes mellitus without complications (Multi) 2018     Past Surgical History:   Procedure Laterality Date    FL GUIDED ASPIRATION OR INJECTION LARGE JOINT LEFT Left 2023    FL GUIDED ASPIRATION OR INJECTION LARGE JOINT [facility]    FL GUIDED ASPIRATION OR INJECTION LARGE JOINT RIGHT Right 2023    FL GUIDED ASPIRATION OR INJECTION LARGE JOINT RIGHT 2023 [facility]     Family History   Family history unknown: Yes     Social History     Tobacco Use    Smoking status: Former     Current packs/day: 0.00     Types: Cigarettes     Quit date:      Years since quittin.4     Passive exposure: Past    Smokeless tobacco: Never   Substance Use Topics    Alcohol use: Never    Drug use: Never       Physical Exam   ED Triage Vitals [24 0954]   Temperature Heart Rate Respirations BP   36.8 °C (98.2  °F) 88 (!) 23 117/62      Pulse Ox Temp Source Heart Rate Source Patient Position   (!) 92 % Temporal -- --      BP Location FiO2 (%)     -- --       Physical Exam    ED Course & MDM   Diagnoses as of 06/23/24 1746   COPD exacerbation (Multi)   Shortness of breath       Medical Decision Making    The patient is a 69-year-old female presenting to the emergency department for evaluation of cough, congestion and shortness of breath with dyspnea on exertion.  The patient states that she does have a history of COPD with chronic symptoms.  She states that it has been worse over the past 3 to 4 days.  She states her  is ill with similar symptoms.  She states that she did call her primary care physician and was told to take Mucinex and then come to the emergency room if her symptoms did not improve.  She states that she does not use supplemental oxygen at home.  She quit smoking more than 15 years ago.  She denies any headache or visual changes.  No chest pain or palpitations.  No abdominal pain.  No nausea vomiting.  No diarrhea or constipation.  No urinary complaints.  She does take Eliquis daily.  No recent travel or immobility.  No recent surgery.  No history of CAD or ACS.  No history of PE or DVT.  All pertinent positives and negatives are recorded above.  All other systems reviewed and otherwise negative.  Vital signs with mild tachypnea and borderline hypoxia but otherwise within normal limits.  Physical exam with a well-nourished well-developed female in no acute distress.  HEENT exam with dry mucous membranes but otherwise unremarkable.  She has no evidence of airway compromise or respiratory distress other than a scant diffuse wheeze on lung exam.  Abdominal exam is benign.  She has no gross motor, neurologic or vascular deficits on exam.  She does have bilateral lower extremity pitting edema.  She has equal pulses bilaterally.      EKG with normal sinus rhythm at 95 bpm, low voltage, normal axis, normal  ST segment, no diffuse flattening of the T waves      Oral aspirin, DuoNebs and IV Solu-Medrol ordered.      Diagnostic labs with leukocytosis, elevated BNP, but otherwise unremarkable.      Initial troponin T 14. Repeat trop T 9      XR chest 1 view   Final Result   No evidence of acute cardiopulmonary process.        MACRO:   None        Signed by: Oliver Soria 6/23/2024 11:12 AM   Dictation workstation:   [id number]           Patient does not have any evidence of ischemia on EKG or cardiac enzymes.  No events on telemetry.  She did report improvement in her symptoms with the medication given in the emergency department.  Chest x-ray without evidence of pneumonia or pneumothorax.  No evidence of CHF.  No widening of the mediastinum.  The patient does have equal pulses bilaterally.      Trial ambulation with pulse ox of greater than 96% at all times on room air      options were discussed with the patient and she was feeling much better and ready to go home.      The patient was released in good condition in the company of her family member with a prescription for a Medrol Dosepak.  She will continue her previously prescribed inhalers and nebulizer treatments as needed.  She will follow-up with her primary care physician within 1 to 2 days for further management of her current symptoms.  She will return to the emergency department sooner with worsening of symptoms or onset of new symptoms.      Impression/diagnosis  Dyspnea, dyspnea on exertion  COPD exacerbation      Critical care time of 15 minutes billed for management of COPD exacerbation with initiation of DuoNebs and IV Solu-Medrol, monitoring the patient on telemetry, consultation with the patient regarding her results.  This time excludes time for billable procedures.      I independently reviewed the results of the EKG and diagnostic labs      I reviewed the results of the diagnostic labs and diagnostic imaging.  Formal radiology reading was completed  by the radiologist    Procedure  Procedures     Anjali Rod MD  06/23/24 1310       Anjali Rod MD  06/23/24 3142

## 2024-06-23 NOTE — DISCHARGE INSTRUCTIONS
Follow-up with your primary care physician within 1 to 2 days for further management of your current symptoms.      Return to the emergency department sooner with worsening of symptoms or onset of new symptoms

## 2024-06-24 LAB
ATRIAL RATE: 95 BPM
P AXIS: 68 DEGREES
P OFFSET: 215 MS
P ONSET: 153 MS
PR INTERVAL: 144 MS
Q ONSET: 225 MS
QRS COUNT: 15 BEATS
QRS DURATION: 78 MS
QT INTERVAL: 370 MS
QTC CALCULATION(BAZETT): 464 MS
QTC FREDERICIA: 431 MS
R AXIS: -24 DEGREES
T AXIS: 117 DEGREES
T OFFSET: 410 MS
VENTRICULAR RATE: 95 BPM

## 2024-06-24 PROCEDURE — RXMED WILLOW AMBULATORY MEDICATION CHARGE

## 2024-06-28 ENCOUNTER — PHARMACY VISIT (OUTPATIENT)
Dept: PHARMACY | Facility: CLINIC | Age: 69
End: 2024-06-28
Payer: MEDICARE

## 2024-06-28 PROCEDURE — RXMED WILLOW AMBULATORY MEDICATION CHARGE

## 2024-07-01 ENCOUNTER — PHARMACY VISIT (OUTPATIENT)
Dept: PHARMACY | Facility: CLINIC | Age: 69
End: 2024-07-01
Payer: MEDICARE

## 2024-07-02 DIAGNOSIS — J44.9 COPD MIXED TYPE (MULTI): ICD-10-CM

## 2024-07-02 RX ORDER — ALBUTEROL SULFATE 0.83 MG/ML
2.5 SOLUTION RESPIRATORY (INHALATION) EVERY 6 HOURS PRN
Qty: 75 ML | Refills: 0 | Status: SHIPPED | OUTPATIENT
Start: 2024-07-02

## 2024-07-08 DIAGNOSIS — I20.89 ANGINA DECUBITUS (CMS-HCC): ICD-10-CM

## 2024-07-08 DIAGNOSIS — J44.9 COPD MIXED TYPE (MULTI): ICD-10-CM

## 2024-07-08 PROCEDURE — RXMED WILLOW AMBULATORY MEDICATION CHARGE

## 2024-07-08 RX ORDER — FLUTICASONE FUROATE AND VILANTEROL 200; 25 UG/1; UG/1
1 POWDER RESPIRATORY (INHALATION) DAILY
Qty: 60 EACH | Refills: 1 | Status: SHIPPED | OUTPATIENT
Start: 2024-07-08 | End: 2025-07-08

## 2024-07-08 NOTE — TELEPHONE ENCOUNTER
Pharmacy is requesting a refill on pt behalf, pharmacy will let pt know when ready for \ out for delivery            Requested Prescriptions     Pending Prescriptions Disp Refills    ranolazine (Ranexa) 500 mg 12 hr tablet 180 tablet 3     Sig: TAKE 1 TABLET BY MOUTH TWO TIMES A DAY

## 2024-07-09 ENCOUNTER — PHARMACY VISIT (OUTPATIENT)
Dept: PHARMACY | Facility: CLINIC | Age: 69
End: 2024-07-09
Payer: MEDICARE

## 2024-07-09 ENCOUNTER — APPOINTMENT (OUTPATIENT)
Dept: PAIN MEDICINE | Facility: CLINIC | Age: 69
End: 2024-07-09
Payer: MEDICARE

## 2024-07-09 VITALS — RESPIRATION RATE: 16 BRPM | DIASTOLIC BLOOD PRESSURE: 77 MMHG | HEART RATE: 84 BPM | SYSTOLIC BLOOD PRESSURE: 107 MMHG

## 2024-07-09 DIAGNOSIS — M47.816 LUMBAR SPONDYLOSIS: ICD-10-CM

## 2024-07-09 DIAGNOSIS — M48.062 SPINAL STENOSIS OF LUMBAR REGION WITH NEUROGENIC CLAUDICATION: Primary | ICD-10-CM

## 2024-07-09 PROCEDURE — RXMED WILLOW AMBULATORY MEDICATION CHARGE

## 2024-07-09 PROCEDURE — 3078F DIAST BP <80 MM HG: CPT | Performed by: PHYSICAL MEDICINE & REHABILITATION

## 2024-07-09 PROCEDURE — 3074F SYST BP LT 130 MM HG: CPT | Performed by: PHYSICAL MEDICINE & REHABILITATION

## 2024-07-09 PROCEDURE — 99214 OFFICE O/P EST MOD 30 MIN: CPT | Performed by: PHYSICAL MEDICINE & REHABILITATION

## 2024-07-09 PROCEDURE — 1125F AMNT PAIN NOTED PAIN PRSNT: CPT | Performed by: PHYSICAL MEDICINE & REHABILITATION

## 2024-07-09 RX ORDER — RANOLAZINE 500 MG/1
500 TABLET, EXTENDED RELEASE ORAL 2 TIMES DAILY
Qty: 180 TABLET | Refills: 3 | Status: SHIPPED | OUTPATIENT
Start: 2024-07-09 | End: 2025-07-04

## 2024-07-09 ASSESSMENT — PAIN SCALES - GENERAL: PAINLEVEL: 9

## 2024-07-09 NOTE — PROGRESS NOTES
"Subjective   Patient ID: Katya Kaplan \"Kat" is a 69 y.o. female who presents for Follow-up and Back Pain.  HPI    Pt returns for a follow-up and would like to schedule an epidural steroid injection. Pt has done physical therapy and continued home exercises with mild improvement. She takes gabapentin 600 mg TID consistently as well as OTC tylenol. Patient has a positive shopping cart sign and endorses weakness in both legs consistent with claudication. Pain is exacerbated with transitional movement and standing too long. She uses a walker to ambulate.       Monitoring and compliance:    ORT:    PDUQ:    Office Agreement:      Review of Systems     Current Outpatient Medications   Medication Instructions    albuterol 2.5 mg /3 mL (0.083 %) nebulizer solution Use 1 vial (2.5 mg) by nebulization every 6 hours if needed for wheezing.    albuterol 90 mcg/actuation inhaler INHALE 2 PUFFS BY MOUTH FOUR TIMES A DAY AS NEEDED FOR WHEEZING    ammonium lactate (Lac-Hydrin) 12 % lotion See Instructions, Instructions: APPLY TOPICALLY TO THE AFFECTED AREA(S) EVERY DAY, # 225 unknown unit, Type: Soft Stop, Pharmacy: [id number], APPLY TOPICALLY TO THE AFFECTED AREA(S) EVERY DAY    apixaban (Eliquis) 5 mg tablet TAKE 1 TABLET BY MOUTH TWO TIMES A DAY    diclofenac sodium (VOLTAREN) 4 g, Topical, 2 times daily PRN    ezetimibe (ZETIA) 10 mg, oral, Daily    fluticasone furoate-vilanteroL (Breo Ellipta) 200-25 mcg/dose inhaler INHALE 1 PUFF BY MOUTH ONE TIME DAILY    gabapentin (NEURONTIN) 600 mg, oral, 3 times daily    insulin regular (NovoLIN R Regular U100 Insulin) 100 unit/mL injection GIVE SUBCUTANEOUSLY BEFORE MEALS AND BED ACCORDING TO SLIDING SCALE:<150: 0 UNITS; 151-200:4 UNITS; 201-250: 8 UNITS; 251-300: 10 UNITS; 301-500: 12 UNITS; 351-400: 14 UNITS; >400: 16 UNITS AND CALL OFFICE    metoprolol succinate XL (TOPROL-XL) 50 mg, oral, Daily    NovoLIN N NPH U-100 Insulin 100 unit/mL injection See Instructions, " Instructions: INJECT 20 UNITS SUBCUTANEOUSLY IN THE MORNING AND THEN 15 UNITS IN THE EVENING, # 10 mL, 5 Refill(s), Type: Maintenance, Pharmacy: [id number], INJECT 20 UNITS SUBCUTANEOUSLY IN THE MORNING AND THEN 15 UNITS IN THE EVENING, 64, in, 02/20/23 12:47:00 EST, Height Measured, 266, lb, 09/09/22 9:49:00 EDT, Weight Measured    omega-3 acid ethyl esters (Lovaza) 1 gram capsule Omega-3-acid Ethyl Esters 1 GM Oral Capsule   Quantity: 30  Refills: 0        Start : 4-Apr-2022   Active    omeprazole (PriLOSEC) 40 mg DR capsule 1 capsule, oral, Daily    ranolazine (Ranexa) 500 mg 12 hr tablet TAKE 1 TABLET BY MOUTH TWO TIMES A DAY    Repatha SureClick 140 mg, subcutaneous, Every 14 days    spironolactone (ALDACTONE) 25 mg, oral, Daily        Past Medical History:   Diagnosis Date    Acute non-ST elevation myocardial infarction (NSTEMI) (Multi) 05/19/2024    Angina pectoris (CMS-[facility]) 10/11/2023    Atrial fibrillation (Multi) 10/11/2023    Benign essential hypertension 09/04/2019    Chest discomfort 10/11/2023    Chronic obstructive pulmonary disease (Multi) 12/30/2018    CKD (chronic kidney disease) 09/09/2022    Coronary arteriosclerosis 10/11/2023    Edema 10/11/2023    Electrocardiogram abnormal 10/11/2023    HTN (hypertension) 12/30/2018    Mixed hyperlipidemia 12/30/2018    PE (pulmonary thromboembolism) (Multi) 10/20/2021    Presence of coronary angioplasty implant and graft 04/08/2022    Type 2 diabetes mellitus without complications (Multi) 12/11/2018        Past Surgical History:   Procedure Laterality Date    FL GUIDED ASPIRATION OR INJECTION LARGE JOINT LEFT Left 12/18/2023    FL GUIDED ASPIRATION OR INJECTION LARGE JOINT LEFT [facility]18/[facility]23 [facility]    FL GUIDED ASPIRATION OR INJECTION LARGE JOINT RIGHT Right 12/14/2023    FL GUIDED ASPIRATION OR INJECTION LARGE JOINT RIGHT 12/14/2023 [facility]        Family History   Family history unknown: Yes         Allergies   Allergen Reactions    Penicillins Swelling    Penicillamine Unknown        Objective     Vitals:    07/09/24 1405   BP: 107/77   Pulse: 84   Resp: 16        Physical Exam    GENERAL EXAM  Vital Signs: Vital signs to include heart rate, respiration rate, blood pressure, and temperature were reviewed.  General Appearance:  Awake, alert, obese, sitting in wheelchair.  Head: Normocephalic without evidence of head injury.  Neck: The appearance of the neck was normal without swelling with a midline trachea.  Eyes: The eyelids and eyebrows exhibited no abnormalities.  Pupils were not pin-point.  Sclera was without icterus.  Lungs: Respiration rhythm and depth was normal.  Respiratory movements were normal without labored breathing.  Cardiovascular: No peripheral edema was present.    Neurological: Patient was oriented to time, place, and person.  Speech was normal.  Balance, gait, and stance were unremarkable.    Psychiatric: Appearance was normal with appropriate dress.  Mood was euthymic and affect was normal.  Skin: Affected regions were without ecchymosis or skin lesions.    Back (MSK/neuro/skin):  Strength 5 out of 5 bilateral lower extremities  Sensation to light-touch grossly intact bilateral lower extremities  Deep tendon reflexes equal bilateral lower extremities  No edema/effusion/erythema  Skin: no skin lesions or scars  B SLR (-)    Physical exam as above except:  TTP and muscle spasm over lumbar paraspinals  B SIJ tenderness (+)      Assessment/Plan   Diagnoses and all orders for this visit:  Spinal stenosis of lumbar region with neurogenic claudication  -     MR lumbar spine wo IV contrast; Future  Lumbar spondylosis  -     MR lumbar spine wo IV contrast; Future         69 year old female with chronic lower back pain with claudication who is ready t0 pursue an epidural steroid injection after conservative therapy with PT and gabapentin failed. X ray in 2022 shows anterolithesis of L4-5 and  degenrative changes in the lumbar spine.     Plan is as follows:  Obtain MRI of lumbar spine   Patient is to call the office once MRI is completed to let the office know we can schedule her for an injection. Pt made aware of the risks and benefits of the procedure and would like to proceed.  Tentatively we will be scheduling her for an L4-5 interlaminar epidural steroid injection pending her MRI.  Patient educated on needing to hold her Eliquis for 5 days prior to the procedure. Patient expressed understanding.  Continue physical therapy exercises.  Continue gabapentin      Cookie Francisco MD    This note was generated with the aid of dictation software, there may be typos despite my attempts at proofreading.     I saw and evaluated the patient. I personally obtained the key and critical portions of the history and physical exam or was physically present for key and critical portions performed by the resident/fellow. I reviewed the resident/fellow's documentation and discussed the patient with the resident/fellow. I agree with the resident/fellow's medical decision making as documented in the note.    Gilmer Victor MD

## 2024-07-09 NOTE — PATIENT INSTRUCTIONS
Call office when MRI completed. We will schedule the injection after MRI reviewed. Please contact your cardiologist to get permission to hold your Eliquis for 5 days prior to the injection.

## 2024-07-10 ENCOUNTER — PHARMACY VISIT (OUTPATIENT)
Dept: PHARMACY | Facility: CLINIC | Age: 69
End: 2024-07-10
Payer: MEDICARE

## 2024-07-12 PROCEDURE — RXMED WILLOW AMBULATORY MEDICATION CHARGE

## 2024-07-15 ENCOUNTER — PHARMACY VISIT (OUTPATIENT)
Dept: PHARMACY | Facility: CLINIC | Age: 69
End: 2024-07-15
Payer: MEDICARE

## 2024-07-18 ENCOUNTER — HOSPITAL ENCOUNTER (OUTPATIENT)
Dept: RADIOLOGY | Facility: HOSPITAL | Age: 69
Discharge: HOME | End: 2024-07-18
Payer: MEDICARE

## 2024-07-18 DIAGNOSIS — M25.552 PAIN IN LEFT HIP: ICD-10-CM

## 2024-07-18 PROCEDURE — 2550000001 HC RX 255 CONTRASTS: Performed by: ORTHOPAEDIC SURGERY

## 2024-07-18 PROCEDURE — 77002 NEEDLE LOCALIZATION BY XRAY: CPT | Mod: LT

## 2024-07-18 PROCEDURE — 77002 NEEDLE LOCALIZATION BY XRAY: CPT | Mod: LEFT SIDE | Performed by: RADIOLOGY

## 2024-07-18 PROCEDURE — 20610 DRAIN/INJ JOINT/BURSA W/O US: CPT | Mod: LEFT SIDE | Performed by: RADIOLOGY

## 2024-07-18 PROCEDURE — 2500000005 HC RX 250 GENERAL PHARMACY W/O HCPCS: Performed by: ORTHOPAEDIC SURGERY

## 2024-07-18 PROCEDURE — 2500000004 HC RX 250 GENERAL PHARMACY W/ HCPCS (ALT 636 FOR OP/ED): Mod: JW | Performed by: ORTHOPAEDIC SURGERY

## 2024-07-18 RX ORDER — TRIAMCINOLONE ACETONIDE 40 MG/ML
40 INJECTION, SUSPENSION INTRA-ARTICULAR; INTRAMUSCULAR
Status: COMPLETED | OUTPATIENT
Start: 2024-07-18 | End: 2024-07-18

## 2024-07-18 RX ORDER — LIDOCAINE HYDROCHLORIDE 10 MG/ML
10 INJECTION, SOLUTION EPIDURAL; INFILTRATION; INTRACAUDAL; PERINEURAL ONCE
Status: COMPLETED | OUTPATIENT
Start: 2024-07-18 | End: 2024-07-18

## 2024-07-18 RX ORDER — BUPIVACAINE HYDROCHLORIDE 5 MG/ML
5 INJECTION, SOLUTION EPIDURAL; INTRACAUDAL
Status: COMPLETED | OUTPATIENT
Start: 2024-07-18 | End: 2024-07-18

## 2024-07-19 ENCOUNTER — PHARMACY VISIT (OUTPATIENT)
Dept: PHARMACY | Facility: CLINIC | Age: 69
End: 2024-07-19
Payer: MEDICARE

## 2024-07-19 PROCEDURE — RXMED WILLOW AMBULATORY MEDICATION CHARGE

## 2024-07-22 PROCEDURE — RXMED WILLOW AMBULATORY MEDICATION CHARGE

## 2024-07-23 ENCOUNTER — HOSPITAL ENCOUNTER (OUTPATIENT)
Dept: RADIOLOGY | Facility: CLINIC | Age: 69
Discharge: HOME | End: 2024-07-23
Payer: MEDICARE

## 2024-07-23 DIAGNOSIS — M47.816 LUMBAR SPONDYLOSIS: ICD-10-CM

## 2024-07-23 DIAGNOSIS — M48.062 SPINAL STENOSIS OF LUMBAR REGION WITH NEUROGENIC CLAUDICATION: ICD-10-CM

## 2024-07-23 PROCEDURE — 72148 MRI LUMBAR SPINE W/O DYE: CPT

## 2024-07-23 PROCEDURE — 72148 MRI LUMBAR SPINE W/O DYE: CPT | Performed by: RADIOLOGY

## 2024-07-25 ENCOUNTER — PREP FOR PROCEDURE (OUTPATIENT)
Dept: PAIN MEDICINE | Facility: CLINIC | Age: 69
End: 2024-07-25
Payer: MEDICARE

## 2024-07-25 DIAGNOSIS — M48.062 LUMBAR STENOSIS WITH NEUROGENIC CLAUDICATION: Primary | ICD-10-CM

## 2024-07-25 RX ORDER — DIAZEPAM 5 MG/1
5 TABLET ORAL ONCE AS NEEDED
OUTPATIENT
Start: 2024-07-25

## 2024-07-29 ENCOUNTER — PHARMACY VISIT (OUTPATIENT)
Dept: PHARMACY | Facility: CLINIC | Age: 69
End: 2024-07-29
Payer: MEDICARE

## 2024-08-05 ENCOUNTER — PHARMACY VISIT (OUTPATIENT)
Dept: PHARMACY | Facility: CLINIC | Age: 69
End: 2024-08-05
Payer: MEDICARE

## 2024-08-05 PROCEDURE — RXMED WILLOW AMBULATORY MEDICATION CHARGE

## 2024-08-08 DIAGNOSIS — I21.4 NON-ST ELEVATION MI (NSTEMI) (MULTI): ICD-10-CM

## 2024-08-08 PROCEDURE — RXMED WILLOW AMBULATORY MEDICATION CHARGE

## 2024-08-09 ENCOUNTER — PHARMACY VISIT (OUTPATIENT)
Dept: PHARMACY | Facility: CLINIC | Age: 69
End: 2024-08-09
Payer: MEDICARE

## 2024-08-12 DIAGNOSIS — I10 BENIGN ESSENTIAL HYPERTENSION: Primary | ICD-10-CM

## 2024-08-12 DIAGNOSIS — Z79.4 TYPE 2 DIABETES MELLITUS WITHOUT COMPLICATION, WITH LONG-TERM CURRENT USE OF INSULIN (MULTI): ICD-10-CM

## 2024-08-12 DIAGNOSIS — E11.9 TYPE 2 DIABETES MELLITUS WITHOUT COMPLICATION, WITH LONG-TERM CURRENT USE OF INSULIN (MULTI): ICD-10-CM

## 2024-08-13 ENCOUNTER — LAB (OUTPATIENT)
Dept: LAB | Facility: LAB | Age: 69
End: 2024-08-13
Payer: MEDICARE

## 2024-08-13 DIAGNOSIS — I10 BENIGN ESSENTIAL HYPERTENSION: ICD-10-CM

## 2024-08-13 DIAGNOSIS — E11.9 TYPE 2 DIABETES MELLITUS WITHOUT COMPLICATION, WITH LONG-TERM CURRENT USE OF INSULIN (MULTI): ICD-10-CM

## 2024-08-13 DIAGNOSIS — Z79.4 TYPE 2 DIABETES MELLITUS WITHOUT COMPLICATION, WITH LONG-TERM CURRENT USE OF INSULIN (MULTI): ICD-10-CM

## 2024-08-13 LAB
ALBUMIN SERPL BCP-MCNC: 3.7 G/DL (ref 3.4–5)
ALP SERPL-CCNC: 67 U/L (ref 33–136)
ALT SERPL W P-5'-P-CCNC: 13 U/L (ref 7–45)
ANION GAP SERPL CALC-SCNC: 16 MMOL/L (ref 10–20)
AST SERPL W P-5'-P-CCNC: 10 U/L (ref 9–39)
BILIRUB SERPL-MCNC: 0.8 MG/DL (ref 0–1.2)
BUN SERPL-MCNC: 19 MG/DL (ref 6–23)
CALCIUM SERPL-MCNC: 9.4 MG/DL (ref 8.6–10.6)
CHLORIDE SERPL-SCNC: 103 MMOL/L (ref 98–107)
CO2 SERPL-SCNC: 24 MMOL/L (ref 21–32)
CREAT SERPL-MCNC: 0.69 MG/DL (ref 0.5–1.05)
EGFRCR SERPLBLD CKD-EPI 2021: >90 ML/MIN/1.73M*2
EST. AVERAGE GLUCOSE BLD GHB EST-MCNC: 146 MG/DL
GLUCOSE SERPL-MCNC: 154 MG/DL (ref 74–99)
HBA1C MFR BLD: 6.7 %
POTASSIUM SERPL-SCNC: 4.2 MMOL/L (ref 3.5–5.3)
PROT SERPL-MCNC: 6.3 G/DL (ref 6.4–8.2)
SODIUM SERPL-SCNC: 139 MMOL/L (ref 136–145)

## 2024-08-13 PROCEDURE — 80053 COMPREHEN METABOLIC PANEL: CPT

## 2024-08-13 PROCEDURE — 83036 HEMOGLOBIN GLYCOSYLATED A1C: CPT

## 2024-08-13 PROCEDURE — 36415 COLL VENOUS BLD VENIPUNCTURE: CPT

## 2024-08-14 ENCOUNTER — APPOINTMENT (OUTPATIENT)
Dept: PRIMARY CARE | Facility: CLINIC | Age: 69
End: 2024-08-14
Payer: MEDICARE

## 2024-08-14 ENCOUNTER — OFFICE VISIT (OUTPATIENT)
Dept: PRIMARY CARE | Facility: CLINIC | Age: 69
End: 2024-08-14
Payer: MEDICARE

## 2024-08-14 ENCOUNTER — TELEPHONE (OUTPATIENT)
Dept: PRIMARY CARE | Facility: CLINIC | Age: 69
End: 2024-08-14

## 2024-08-14 VITALS
TEMPERATURE: 97.5 F | DIASTOLIC BLOOD PRESSURE: 62 MMHG | OXYGEN SATURATION: 97 % | HEIGHT: 63 IN | HEART RATE: 80 BPM | BODY MASS INDEX: 45.18 KG/M2 | WEIGHT: 255 LBS | SYSTOLIC BLOOD PRESSURE: 120 MMHG

## 2024-08-14 DIAGNOSIS — E11.22 TYPE 2 DIABETES MELLITUS WITH CHRONIC KIDNEY DISEASE, WITH LONG-TERM CURRENT USE OF INSULIN, UNSPECIFIED CKD STAGE (MULTI): ICD-10-CM

## 2024-08-14 DIAGNOSIS — E11.9 TYPE 2 DIABETES MELLITUS WITHOUT COMPLICATION, WITH LONG-TERM CURRENT USE OF INSULIN (MULTI): ICD-10-CM

## 2024-08-14 DIAGNOSIS — E11.9 TYPE 2 DIABETES MELLITUS WITHOUT COMPLICATION, WITH LONG-TERM CURRENT USE OF INSULIN (MULTI): Primary | ICD-10-CM

## 2024-08-14 DIAGNOSIS — Z79.4 TYPE 2 DIABETES MELLITUS WITH CHRONIC KIDNEY DISEASE, WITH LONG-TERM CURRENT USE OF INSULIN, UNSPECIFIED CKD STAGE (MULTI): ICD-10-CM

## 2024-08-14 DIAGNOSIS — I10 HYPERTENSION, UNSPECIFIED TYPE: ICD-10-CM

## 2024-08-14 DIAGNOSIS — I48.11 LONGSTANDING PERSISTENT ATRIAL FIBRILLATION (MULTI): Primary | ICD-10-CM

## 2024-08-14 DIAGNOSIS — E78.2 MIXED HYPERLIPIDEMIA: ICD-10-CM

## 2024-08-14 DIAGNOSIS — I10 BENIGN ESSENTIAL HYPERTENSION: ICD-10-CM

## 2024-08-14 DIAGNOSIS — Z79.4 TYPE 2 DIABETES MELLITUS WITHOUT COMPLICATION, WITH LONG-TERM CURRENT USE OF INSULIN (MULTI): Primary | ICD-10-CM

## 2024-08-14 DIAGNOSIS — Z79.4 TYPE 2 DIABETES MELLITUS WITHOUT COMPLICATION, WITH LONG-TERM CURRENT USE OF INSULIN (MULTI): ICD-10-CM

## 2024-08-14 PROCEDURE — 3074F SYST BP LT 130 MM HG: CPT | Performed by: NURSE PRACTITIONER

## 2024-08-14 PROCEDURE — 1125F AMNT PAIN NOTED PAIN PRSNT: CPT | Performed by: NURSE PRACTITIONER

## 2024-08-14 PROCEDURE — 3044F HG A1C LEVEL LT 7.0%: CPT | Performed by: NURSE PRACTITIONER

## 2024-08-14 PROCEDURE — 3078F DIAST BP <80 MM HG: CPT | Performed by: NURSE PRACTITIONER

## 2024-08-14 PROCEDURE — 3008F BODY MASS INDEX DOCD: CPT | Performed by: NURSE PRACTITIONER

## 2024-08-14 PROCEDURE — 1036F TOBACCO NON-USER: CPT | Performed by: NURSE PRACTITIONER

## 2024-08-14 PROCEDURE — 1159F MED LIST DOCD IN RCRD: CPT | Performed by: NURSE PRACTITIONER

## 2024-08-14 PROCEDURE — 99214 OFFICE O/P EST MOD 30 MIN: CPT | Performed by: NURSE PRACTITIONER

## 2024-08-14 ASSESSMENT — PATIENT HEALTH QUESTIONNAIRE - PHQ9
SUM OF ALL RESPONSES TO PHQ9 QUESTIONS 1 AND 2: 0
1. LITTLE INTEREST OR PLEASURE IN DOING THINGS: NOT AT ALL
2. FEELING DOWN, DEPRESSED OR HOPELESS: NOT AT ALL

## 2024-08-14 ASSESSMENT — PAIN SCALES - GENERAL: PAINLEVEL: 8

## 2024-08-14 NOTE — PATIENT INSTRUCTIONS
Please schedule your annual wellness exam before the end of the year.     Please schedule with Mamie Raza our clinical pharmacist to start a continuous glucose monitor (CGM) and medication management for diabetes

## 2024-08-14 NOTE — PROGRESS NOTES
"Subjective   Patient ID: Mariam Kaplan is a 69 y.o. female who presents for Hyperlipidemia and Hypertension.    HANNY Becerra is a 70 yo F who presents for interval visit    Diet is varied. Is following Weight Watchers  No exercise  Down 23lbs since 9/23    Dr Esteban cardiology every 6 months - due 11/24  Stopped clopidogrel due to excess bruising  Is on repatha, zetia for lipids    Hx of DM   Checking glucose with reli on meter  Glucose 170 this AM  Failed jardiance (yeast infections)  Using lantus and sliding scale insulin  She is interested in GLP1 medications to help with DM/weight loss    Review of Systems  Constitutional Symptoms: Negative for fever, loss of appetite, headaches, fatigue.   Cardiovascular: Negative for chest pain/pressure, palpitations, edema  Respiratory: Negative for shortness of breath, dyspnea on exertion, pain with breathing, coughing.   Gastrointestinal: Negative for nausea, vomiting, abdominal pain, change in bowel habits  Musculoskeletal: Negative for joint pain, joint swelling, myalgias, cramps.   Integumentary: Negative for skin trouble or rash.   Neurological: Negative for headache, numbness, tingling, weakness, tremors.   Psychiatric: Negative for depression, anxiety.   Endocrine: Negative for weight gain, heat or cold intolerance, polyuria, polydipsia, polyphagia.   Hematologic/Lymphatic: Negative for bruising, abnormal bleeding, swollen glands.     Objective   /62 (BP Location: Left arm) Comment (BP Location): wrist  Pulse 80   Temp 36.4 °C (97.5 °F) (Temporal)   Ht 1.6 m (5' 3\")   Wt 116 kg (255 lb)   SpO2 97%   BMI 45.17 kg/m²     Physical Exam  alert and oriented x3, NAD  Neck supple with no JVD  Lungs CTA bilaterally  Heart with RRR with no edema.  Abd obese, + normoactive bowel sounds, abd soft NT/ND  skin warm and dry  Neuro grossly intact     Assessment/Plan   Diagnoses and all orders for this visit:  Type 2 diabetes mellitus without complication, with long-term " current use of insulin (Multi)  Needs better control  A1c 6.7 with glucose 147  Will have pt see clinical pharmacist to help with med management  Would benefit from sliding scale removal and initiation of GLP-1 for DM control/weight loss/cardiac protection  Pt to see me in 3 months after meds adjusted  Benign essential hypertension  BP stable on metoprolol xl 50mg daily  Continue current medications  Mediterranean diet/QUAN diet  Exercise, Safety  Monitor bp and record  Follow up 6 months  Mixed hyperlipidemia  HDL 78 HDL 46  Under care of cardiology  On zetia and repatha  Other orders  -     Follow Up In Primary Care - Established; Future  -     Follow Up In Primary Care - Medicare Annual; Future

## 2024-08-15 ENCOUNTER — CLINICAL SUPPORT (OUTPATIENT)
Dept: PRIMARY CARE | Facility: CLINIC | Age: 69
End: 2024-08-15
Payer: MEDICARE

## 2024-08-15 DIAGNOSIS — E11.9 TYPE 2 DIABETES MELLITUS WITHOUT COMPLICATION, WITHOUT LONG-TERM CURRENT USE OF INSULIN (MULTI): Primary | ICD-10-CM

## 2024-08-15 DIAGNOSIS — Z79.4 TYPE 2 DIABETES MELLITUS WITHOUT COMPLICATION, WITH LONG-TERM CURRENT USE OF INSULIN (MULTI): ICD-10-CM

## 2024-08-15 DIAGNOSIS — I21.4 NON-ST ELEVATION MI (NSTEMI) (MULTI): ICD-10-CM

## 2024-08-15 DIAGNOSIS — E11.9 TYPE 2 DIABETES MELLITUS WITHOUT COMPLICATION, WITH LONG-TERM CURRENT USE OF INSULIN (MULTI): ICD-10-CM

## 2024-08-15 NOTE — PROGRESS NOTES
"DM FOLLOW UP  E11.9    Katya Kaplan \"Mariam\" presents to the office for her DM review. Referring Provider: Janna Lux, APRN-CNP    Pt suffers with back pain.  Knows reasonable carb allowances.  Weight watchers x 3 months.  Has lost 22 pounds in this time.    CURRENT DM PHARMACOTHERAPY   Novolin N 20 units bid   Novolin R 4-10 units per sliding scale  4+ 50>150  Reviewed all medications by prescribing practitioner (such as prescriptions, OTCs, herbal therapies and supplements) and documented in the medical record.   Pt denies SE/intolerances.         Summary of CGM Findings:  Testing at home via glucometer    Hemoglobin A1C (%)   Date Value   08/13/2024 6.7 (H)   09/01/2023 8.6 (H)   04/01/2022 6.2 (A)   11/10/2021 7.8 (H)     Glucose (mg/dL)   Date Value   08/13/2024 154 (H)     Creatinine (mg/dL)   Date Value   08/13/2024 0.69     eGFR (mL/min/1.73m*2)   Date Value   08/13/2024 >90       Current Outpatient Medications on File Prior to Visit   Medication Sig Dispense Refill    albuterol 2.5 mg /3 mL (0.083 %) nebulizer solution Use 1 vial (2.5 mg) by nebulization every 6 hours if needed for wheezing. 75 mL 0    albuterol 90 mcg/actuation inhaler INHALE 2 PUFFS BY MOUTH FOUR TIMES A DAY AS NEEDED FOR WHEEZING 6.7 g 4    ammonium lactate (Lac-Hydrin) 12 % lotion See Instructions, Instructions: APPLY TOPICALLY TO THE AFFECTED AREA(S) EVERY DAY, # 225 unknown unit, Type: Soft Stop, Pharmacy: [id number], APPLY TOPICALLY TO THE AFFECTED AREA(S) EVERY DAY      apixaban (Eliquis) 5 mg tablet TAKE 1 TABLET BY MOUTH TWO TIMES A  tablet 0    diclofenac sodium (Voltaren) 1 % gel Apply 4.5 inches (4 g) topically 2 times a day as needed.      evolocumab (Repatha SureClick) 140 mg/mL injection Inject 1 mL (140 mg) under the skin every 14 (fourteen) days. 2 mL 11    ezetimibe (Zetia) 10 mg tablet Take 1 tablet (10 mg) by mouth once daily. 90 tablet 3    fluticasone furoate-vilanteroL (Breo Ellipta) 200-25 " mcg/dose inhaler INHALE 1 PUFF BY MOUTH ONE TIME DAILY 60 each 1    gabapentin (Neurontin) 300 mg capsule Take 2 capsules (600 mg) by mouth 3 times a day. 180 capsule 3    insulin regular (NovoLIN R Regular U100 Insulin) 100 unit/mL injection GIVE SUBCUTANEOUSLY BEFORE MEALS AND BED ACCORDING TO SLIDING SCALE:<150: 0 UNITS; 151-200:4 UNITS; 201-250: 8 UNITS; 251-300: 10 UNITS; 301-500: 12 UNITS; 351-400: 14 UNITS; >400: 16 UNITS AND CALL OFFICE 10 mL 1    metoprolol succinate XL (Toprol-XL) 50 mg 24 hr tablet Take 1 tablet (50 mg) by mouth once daily. 90 tablet 3    NovoLIN N NPH U-100 Insulin 100 unit/mL injection See Instructions, Instructions: INJECT 20 UNITS SUBCUTANEOUSLY IN THE MORNING AND THEN 15 UNITS IN THE EVENING, # 10 mL, 5 Refill(s), Type: Maintenance, Pharmacy: [facility], INJECT 20 UNITS SUBCUTANEOUSLY IN THE MORNING AND THEN 15 UNITS IN THE EVENING, 64, in, 02/20/23 12:47:00 EST, Height Measured, 266, lb, 09/09/22 9:49:00 EDT, Weight Measured      omega-3 acid ethyl esters (Lovaza) 1 gram capsule Omega-3-acid Ethyl Esters 1 GM Oral Capsule   Quantity: 30  Refills: 0        Start : 4-Apr-2022   Active      omeprazole (PriLOSEC) 40 mg DR capsule Take 1 capsule (40 mg) by mouth once daily.      ranolazine (Ranexa) 500 mg 12 hr tablet TAKE 1 TABLET BY MOUTH TWO TIMES A  tablet 3    spironolactone (Aldactone) 25 mg tablet Take 1 tablet (25 mg) by mouth once daily.       No current facility-administered medications on file prior to visit.       Assessment/Plan:   Pt should qualify for [email or web address]] program, will send S[employer] benefits info  Suggest change to longer acting basal insulin to eliminate bid dosing  Suggest start GLP-1 for better glycemic control and wt loss     Pt is agreeable       Plan:  After Mamie receives [facility] [[email or web address]] information then:   STOP Novolin N and Novolin R insulins  START Lantus Solostar 40 units once daily   START Mounjaro 2.5mg once weekly on the same day of the week   4.  Follow  up with Mamie in one month  Continue all meds under the continuation of care with the referring provider and clinical pharmacy team.  Follow up with Clinical Pharmacist 1 month    Data reviewed and evaluated by DONOVAN Raza RPh, JAMIR  Treatment and plan changes discussed with Becky Whitney

## 2024-08-15 NOTE — PATIENT INSTRUCTIONS
STOP Novolin N and Novolin R insulins  START Lantus Solostar 40 units once daily   START Mounjaro 2.5mg once weekly on the same day of the week   4.  Follow up with Mamie in one month

## 2024-08-16 ENCOUNTER — TELEMEDICINE (OUTPATIENT)
Dept: PHARMACY | Facility: HOSPITAL | Age: 69
End: 2024-08-16
Payer: MEDICARE

## 2024-08-16 DIAGNOSIS — Z79.4 TYPE 2 DIABETES MELLITUS WITH CHRONIC KIDNEY DISEASE, WITH LONG-TERM CURRENT USE OF INSULIN, UNSPECIFIED CKD STAGE (MULTI): ICD-10-CM

## 2024-08-16 DIAGNOSIS — E11.22 TYPE 2 DIABETES MELLITUS WITH CHRONIC KIDNEY DISEASE, WITH LONG-TERM CURRENT USE OF INSULIN, UNSPECIFIED CKD STAGE (MULTI): ICD-10-CM

## 2024-08-16 DIAGNOSIS — I48.11 LONGSTANDING PERSISTENT ATRIAL FIBRILLATION (MULTI): ICD-10-CM

## 2024-08-16 DIAGNOSIS — J44.9 COPD MIXED TYPE (MULTI): ICD-10-CM

## 2024-08-16 PROCEDURE — RXMED WILLOW AMBULATORY MEDICATION CHARGE

## 2024-08-16 RX ORDER — FLUTICASONE FUROATE AND VILANTEROL 200; 25 UG/1; UG/1
1 POWDER RESPIRATORY (INHALATION) DAILY
Qty: 180 EACH | Refills: 1 | Status: SHIPPED | OUTPATIENT
Start: 2024-08-16 | End: 2025-08-16

## 2024-08-16 RX ORDER — BLOOD-GLUCOSE,RECEIVER,CONT
EACH MISCELLANEOUS
Qty: 1 EACH | Refills: 0 | Status: SHIPPED | OUTPATIENT
Start: 2024-08-16

## 2024-08-16 RX ORDER — PEN NEEDLE, DIABETIC 30 GX3/16"
NEEDLE, DISPOSABLE MISCELLANEOUS
Qty: 100 EACH | Refills: 2 | Status: SHIPPED | OUTPATIENT
Start: 2024-08-16

## 2024-08-16 RX ORDER — INSULIN GLARGINE 100 [IU]/ML
40 INJECTION, SOLUTION SUBCUTANEOUS NIGHTLY
Qty: 15 ML | Refills: 1 | Status: SHIPPED | OUTPATIENT
Start: 2024-08-16 | End: 2025-08-16

## 2024-08-16 RX ORDER — FLUTICASONE FUROATE AND VILANTEROL 200; 25 UG/1; UG/1
1 POWDER RESPIRATORY (INHALATION) DAILY
Qty: 180 EACH | Refills: 1 | Status: SHIPPED | OUTPATIENT
Start: 2024-08-16 | End: 2024-08-16 | Stop reason: ALTCHOICE

## 2024-08-16 RX ORDER — TIRZEPATIDE 2.5 MG/.5ML
2.5 INJECTION, SOLUTION SUBCUTANEOUS
Qty: 2 ML | Refills: 0 | Status: SHIPPED | OUTPATIENT
Start: 2024-08-16 | End: 2024-09-13

## 2024-08-16 RX ORDER — BLOOD-GLUCOSE SENSOR
EACH MISCELLANEOUS
Qty: 6 EACH | Refills: 3 | Status: SHIPPED | OUTPATIENT
Start: 2024-08-16

## 2024-08-16 NOTE — PROGRESS NOTES
Please review for internal [employer] Assistance [employer] (VAF) eligibility. Prescriptions have been sent to [facility].     Katya Kaplan  1955   [id number]  [phone]   [email or web address]  Delivery Preference (if known): MAIL  Priority:  Hold Medication until [facility] approved/denied  Filing Status: Patient does not file taxes  Household size: 2  Financial Documents To Be Collected By: Documents attached to email  Medication(s):    Eliquis, Breo Ellipta, Lantus, Mounjaro    *I have confirmed the above medications are listed on the current VAF formulary: [email or web address]    I acknowledge the [facility] staff will further reach out to the patient to confirm additional details as needed, including but not limited to collecting financial documentation, confirming delivery information, obtaining payment method for non-VAF medications.      Mamie Raza [facility]

## 2024-08-16 NOTE — PROGRESS NOTES
"MEDICATION MANAGEMENT    Katya Kaplan \"Mariam\" is a 69 y.o. female who was referred by TRISTA Whitney to complete medication reconciliation and review with the clinical pharmacist.  A comprehensive medication review was completed with the patient via telephone today.  With patient's permission, this is a Telemedicine visit with audio. Provider located at office address. Patient located at their home address. All issues as documented below were discussed and addressed but limited physical exam was performed. If it was felt that the patient should be evaluated via face-to-face office appointment(s) they were directed to appropriate location.  Patient reports financial barrier with current medication.      MEDICATION HISTORY  Allergies   Allergen Reactions    Penicillins Swelling     Current Outpatient Medications on File Prior to Visit   Medication Sig Dispense Refill    albuterol 2.5 mg /3 mL (0.083 %) nebulizer solution Use 1 vial (2.5 mg) by nebulization every 6 hours if needed for wheezing. 75 mL 0    albuterol 90 mcg/actuation inhaler INHALE 2 PUFFS BY MOUTH FOUR TIMES A DAY AS NEEDED FOR WHEEZING 6.7 g 4    ammonium lactate (Lac-Hydrin) 12 % lotion See Instructions, Instructions: APPLY TOPICALLY TO THE AFFECTED AREA(S) EVERY DAY, # 225 unknown unit, Type: Soft Stop, Pharmacy: [id number], APPLY TOPICALLY TO THE AFFECTED AREA(S) EVERY DAY      apixaban (Eliquis) 5 mg tablet TAKE 1 TABLET BY MOUTH TWO TIMES A  tablet 0    blood-glucose meter,continuous (FreeStyle [place] 3 [place]) [facility] Use as instructed 1 each 0    blood-glucose sensor (FreeStyle [place] 3 Sensor) device Change sensor every 14 days as directed 6 each 3    diclofenac sodium (Voltaren) 1 % gel Apply 4.5 inches (4 g) topically 2 times a day as needed.      evolocumab (Repatha SureClick) 140 mg/mL injection Inject 1 mL (140 mg) under the skin every 14 (fourteen) days. 2 mL 11    ezetimibe (Zetia) 10 mg tablet Take 1 tablet (10 " "mg) by mouth once daily. 90 tablet 3    fluticasone furoate-vilanteroL (Breo Ellipta) 200-25 mcg/dose inhaler INHALE 1 PUFF BY MOUTH ONE TIME DAILY 60 each 1    gabapentin (Neurontin) 300 mg capsule Take 2 capsules (600 mg) by mouth 3 times a day. 180 capsule 3    insulin glargine (Lantus Solostar U-100 Insulin) 100 unit/mL (3 mL) pen Inject 40 Units under the skin once daily at bedtime. 15 mL 1    metoprolol succinate XL (Toprol-XL) 50 mg 24 hr tablet Take 1 tablet (50 mg) by mouth once daily. 90 tablet 3    omega-3 acid ethyl esters (Lovaza) 1 gram capsule Omega-3-acid Ethyl Esters 1 GM Oral Capsule   Quantity: 30  Refills: 0        Start : 4-Apr-2022   Active      omeprazole (PriLOSEC) 40 mg DR capsule Take 1 capsule (40 mg) by mouth once daily.      pen needle, diabetic 32 gauge x 5/32\" needle Use daily with insulin injection. 100 each 2    ranolazine (Ranexa) 500 mg 12 hr tablet TAKE 1 TABLET BY MOUTH TWO TIMES A  tablet 3    spironolactone (Aldactone) 25 mg tablet Take 1 tablet (25 mg) by mouth once daily.      tirzepatide (Mounjaro) 2.5 mg/0.5 mL pen injector Inject 2.5 mg under the skin every 7 days for 28 days. Inject 2.5mg under the skin once weekly for 4 weeks 2 mL 0    [DISCONTINUED] apixaban (Eliquis) 5 mg tablet TAKE 1 TABLET BY MOUTH TWO TIMES A  tablet 0    [DISCONTINUED] insulin regular (NovoLIN R Regular U100 Insulin) 100 unit/mL injection GIVE SUBCUTANEOUSLY BEFORE MEALS AND BED ACCORDING TO SLIDING SCALE:<150: 0 UNITS; 151-200:4 UNITS; 201-250: 8 UNITS; 251-300: 10 UNITS; 301-500: 12 UNITS; 351-400: 14 UNITS; >400: 16 UNITS AND CALL OFFICE 10 mL 1    [DISCONTINUED] NovoLIN N NPH U-100 Insulin 100 unit/mL injection See Instructions, Instructions: INJECT 20 UNITS SUBCUTANEOUSLY IN THE MORNING AND THEN 15 UNITS IN THE EVENING, # 10 mL, 5 Refill(s), Type: Maintenance, Pharmacy: [id number], INJECT 20 UNITS SUBCUTANEOUSLY IN THE MORNING AND THEN 15 UNITS IN THE EVENING, 64, in, " 02/20/23 12:47:00 EST, Height Measured, 266, lb, 09/09/22 9:49:00 EDT, Weight Measured       No current facility-administered medications on file prior to visit.        Patient Assistance Screening (VAF)  Patient verbally reports monthly or yearly income which is less than 400% federal poverty level.  Application for program has been submitted for the following medications:     Eliquis, Breo Ellipta, Mounjaro, Lantus    Patient has been informed that program team will be reaching out to them to discuss necessary documentation, instructed to answer phone/return voicemail.   Patient aware this process may take up to 6 weeks.   If approved medication must be filled through [facility] pharmacy and may be picked up or mailed to patient.       LABS  There were no vitals taken for this visit.   Lab Results   Component Value Date    HGBA1C 6.7 (H) 08/13/2024    HGBA1C 8.6 (H) 09/01/2023    HGBA1C 6.2 (A) 04/01/2022     Lab Results   Component Value Date    BILITOT 0.8 08/13/2024    CALCIUM 9.4 08/13/2024    CO2 24 08/13/2024     08/13/2024    CREATININE 0.69 08/13/2024    GLUCOSE 154 (H) 08/13/2024    ALKPHOS 67 08/13/2024    K 4.2 08/13/2024    PROT 6.3 (L) 08/13/2024     08/13/2024    AST 10 08/13/2024    ALT 13 08/13/2024    BUN 19 08/13/2024    ANIONGAP 16 08/13/2024    MG 1.97 04/06/2022    ALBUMIN 3.7 08/13/2024    LIPASE 28 06/11/2023    GFRF 87 04/06/2022     Lab Results   Component Value Date    TRIG 121 09/01/2023    CHOL 148 09/01/2023    LDLCALC 78 09/01/2023    HDL 46 (L) 09/01/2023         Assessment/Plan    Comments/Recommendations to PCP:  Reconciled medications with patient via telephone today.  Reviewed instructions, MOA, SE and dose for Mounjaro, Lantus, Breo Ellipta, Eliquis   Patient verbalizes understanding regarding plan of care and all questions answered   4.   Pt will follow up with PCP as scheduled    Mounjaro Education:   Counseled patient on Mounjaro MOA, expectations, side effects,  duration of therapy, administration, and monitoring parameters.  Provided detailed dosing and administration counseling to ensure proper technique.   Reviewed Mounjaro titration schedule, starting with 2.5 mg once weekly to a goal of 15 mg once weekly if tolerated  Counseled patient on the benefits of GLP-1ra glycemic control and weight loss  Reviewed storage requirements of Mounjaro when not in use, and when to administer the medication if a dose is missed.  Advised patient that they may experience improved satiety after meals and portion sizes of meals may be reduced as doses of Mounjaro increase.        Mamie Raza, [facility]    Verbal consent to manage patient's drug therapy was obtained from the patient and/or an individual authorized to act on behalf of a patient. They were informed they may decline to participate or withdraw from participation in pharmacy services at any time.

## 2024-08-19 PROCEDURE — RXMED WILLOW AMBULATORY MEDICATION CHARGE

## 2024-08-20 ENCOUNTER — PHARMACY VISIT (OUTPATIENT)
Dept: PHARMACY | Facility: CLINIC | Age: 69
End: 2024-08-20
Payer: MEDICARE

## 2024-08-21 ENCOUNTER — PHARMACY VISIT (OUTPATIENT)
Dept: PHARMACY | Facility: CLINIC | Age: 69
End: 2024-08-21
Payer: MEDICARE

## 2024-08-21 ENCOUNTER — HOSPITAL ENCOUNTER (OUTPATIENT)
Dept: OPERATING ROOM | Facility: CLINIC | Age: 69
Setting detail: OUTPATIENT SURGERY
Discharge: HOME | End: 2024-08-21
Payer: MEDICARE

## 2024-08-21 VITALS
OXYGEN SATURATION: 97 % | WEIGHT: 255 LBS | HEIGHT: 64 IN | RESPIRATION RATE: 16 BRPM | DIASTOLIC BLOOD PRESSURE: 55 MMHG | BODY MASS INDEX: 43.54 KG/M2 | TEMPERATURE: 97.5 F | HEART RATE: 65 BPM | SYSTOLIC BLOOD PRESSURE: 112 MMHG

## 2024-08-21 DIAGNOSIS — M48.062 LUMBAR STENOSIS WITH NEUROGENIC CLAUDICATION: ICD-10-CM

## 2024-08-21 PROCEDURE — 2500000004 HC RX 250 GENERAL PHARMACY W/ HCPCS (ALT 636 FOR OP/ED): Performed by: PHYSICAL MEDICINE & REHABILITATION

## 2024-08-21 PROCEDURE — 3600000001 HC OR TIME - INITIAL BASE CHARGE - PROCEDURE LEVEL ONE

## 2024-08-21 PROCEDURE — 2500000002 HC RX 250 W HCPCS SELF ADMINISTERED DRUGS (ALT 637 FOR MEDICARE OP, ALT 636 FOR OP/ED): Performed by: PHYSICAL MEDICINE & REHABILITATION

## 2024-08-21 PROCEDURE — 3600000006 HC OR TIME - EACH INCREMENTAL 1 MINUTE - PROCEDURE LEVEL ONE

## 2024-08-21 PROCEDURE — 7100000009 HC PHASE TWO TIME - INITIAL BASE CHARGE

## 2024-08-21 PROCEDURE — 2500000005 HC RX 250 GENERAL PHARMACY W/O HCPCS: Performed by: PHYSICAL MEDICINE & REHABILITATION

## 2024-08-21 PROCEDURE — 2550000001 HC RX 255 CONTRASTS: Performed by: PHYSICAL MEDICINE & REHABILITATION

## 2024-08-21 PROCEDURE — 7100000010 HC PHASE TWO TIME - EACH INCREMENTAL 1 MINUTE

## 2024-08-21 PROCEDURE — 62323 NJX INTERLAMINAR LMBR/SAC: CPT | Performed by: PHYSICAL MEDICINE & REHABILITATION

## 2024-08-21 RX ORDER — METHYLPREDNISOLONE ACETATE 40 MG/ML
INJECTION, SUSPENSION INTRA-ARTICULAR; INTRALESIONAL; INTRAMUSCULAR; SOFT TISSUE AS NEEDED
Status: COMPLETED | OUTPATIENT
Start: 2024-08-21 | End: 2024-08-21

## 2024-08-21 RX ORDER — LIDOCAINE HYDROCHLORIDE 5 MG/ML
INJECTION, SOLUTION INFILTRATION; PERINEURAL AS NEEDED
Status: COMPLETED | OUTPATIENT
Start: 2024-08-21 | End: 2024-08-21

## 2024-08-21 RX ORDER — DIAZEPAM 5 MG/1
5 TABLET ORAL ONCE AS NEEDED
Status: COMPLETED | OUTPATIENT
Start: 2024-08-21 | End: 2024-08-21

## 2024-08-21 ASSESSMENT — PATIENT HEALTH QUESTIONNAIRE - PHQ9
1. LITTLE INTEREST OR PLEASURE IN DOING THINGS: NOT AT ALL
2. FEELING DOWN, DEPRESSED OR HOPELESS: NOT AT ALL
SUM OF ALL RESPONSES TO PHQ9 QUESTIONS 1 AND 2: 0

## 2024-08-21 ASSESSMENT — ENCOUNTER SYMPTOMS
OCCASIONAL FEELINGS OF UNSTEADINESS: 1
LOSS OF SENSATION IN FEET: 0
DEPRESSION: 0

## 2024-08-21 ASSESSMENT — COLUMBIA-SUICIDE SEVERITY RATING SCALE - C-SSRS
2. HAVE YOU ACTUALLY HAD ANY THOUGHTS OF KILLING YOURSELF?: NO
6. HAVE YOU EVER DONE ANYTHING, STARTED TO DO ANYTHING, OR PREPARED TO DO ANYTHING TO END YOUR LIFE?: NO
1. IN THE PAST MONTH, HAVE YOU WISHED YOU WERE DEAD OR WISHED YOU COULD GO TO SLEEP AND NOT WAKE UP?: NO

## 2024-08-21 ASSESSMENT — PAIN - FUNCTIONAL ASSESSMENT: PAIN_FUNCTIONAL_ASSESSMENT: 0-10

## 2024-08-21 ASSESSMENT — PAIN SCALES - GENERAL
PAINLEVEL_OUTOF10: 2
PAINLEVEL_OUTOF10: 0 - NO PAIN

## 2024-08-21 NOTE — DISCHARGE INSTRUCTIONS
Discharge Instructions for Anesthesiology Pain Service Patients - Dr. Victor    Observe the needle site for excessive bleeding (slow general oozing that completely soaks the dressing or fresh bright red bleeding).  In either case, apply pressure to the area, elevate it if possible and call your doctor at once.    Observe/monitor for the following signs and symptoms:         Needle site:  change in color, numbness or tingling, coldness to touch, swelling, drainage       Temperature of 101.5 or higher       Increased or uncontrollable pain    If you notice any of the above signs or symptoms, please call your doctor at once.    Your pain may not be gone immediately after this procedure; it generally takes 3 to 5 days for the steroid to work.    Keep the needle site clean and dry for 24 hours.    Continue your present medications.    No driving or operating machinery for 24 hours if you have received sedation.    Make an appointment to see you doctor in 2-3 weeks    Central Scheduling Number:  [phone]  Dr. Victor's office:  [phone]  Dr. Victor's Nurse line:  [phone]  After hours Pain Management:  [phone].    If any problems occur, or if you have further questions, please call you doctor as soon as possible.  If you find that you cannot reach your doctor, but feel that your condition needs a doctors attention, go to the  emergency room nearest your home.

## 2024-08-21 NOTE — H&P
"HISTORY AND PHYSICAL    History Of Present Illness  Katya Kaplan \"Kat" is a 69 y.o. female presenting with chronic low back pain here for Lumbar interlaminar epidural steroid injection; she denies any recent antibiotic use or infections, she held Eliquis for 5 days, and aspirin for 7 days, and she denies contrast or local anesthetic allergies.    Past Medical History  Past Medical History:   Diagnosis Date    Acute non-ST elevation myocardial infarction (NSTEMI) (Multi) 05/19/2024    Angina pectoris ([facility]) 10/11/2023    Atrial fibrillation (Multi) 10/11/2023    Benign essential hypertension 09/04/2019    Chest discomfort 10/11/2023    Chronic obstructive pulmonary disease (Multi) 12/30/2018    CKD (chronic kidney disease) 09/09/2022    Coronary arteriosclerosis 10/11/2023    Edema 10/11/2023    Electrocardiogram abnormal 10/11/2023    HTN (hypertension) 12/30/2018    Mixed hyperlipidemia 12/30/2018    PE (pulmonary thromboembolism) (Multi) 10/20/2021    Presence of coronary angioplasty implant and graft 04/08/2022    Type 2 diabetes mellitus without complications (Multi) 12/11/2018       Surgical History  Past Surgical History:   Procedure Laterality Date    FL GUIDED ASPIRATION OR INJECTION LARGE JOINT LEFT Left 12/18/2023    FL GUIDED ASPIRATION OR INJECTION LARGE JOINT LEFT 12/18/2023 Ralph Triplett MD [facility]    FL GUIDED ASPIRATION OR INJECTION LARGE JOINT RIGHT Right 12/14/2023    FL GUIDED ASPIRATION OR INJECTION LARGE JOINT RIGHT 12/14/2023 Ralph Triplett MD [facility]        Social History  She reports that she quit smoking about 26 years ago. Her smoking use included cigarettes. She has been exposed to tobacco smoke. She has never used smokeless tobacco. She reports that she does not drink alcohol and does not use drugs.    Family History  Family History   Family history unknown: Yes        Allergies  Penicillins    Review of Systems   12 point ROS done and negative except for the above. " "  Physical Exam     General: NAD, well groomed, well nourished  Eyes: Non-icteric sclera, EOMI  Ears, Nose, Mouth, and Throat: External ears and nose appear to be without deformity or rash. No lesions or masses noted. Hearing is grossly intact.   Neck: Trachea midline  Respiratory: Nonlabored breathing   Cardiovascular: No peripheral edema   Skin: No rashes or open lesions/ulcers identified on skin.    Last Recorded Vitals  Blood pressure 154/76, pulse 71, temperature 36.5 °C (97.7 °F), temperature source Temporal, resp. rate 16, height 1.613 m (5' 3.5\"), weight 116 kg (255 lb), SpO2 95%.    Relevant Results           Assessment/Plan       Risks, benefits, alternatives discussed. All questions answered to the best of my ability. Patient agrees to proceed.   -We will proceed with planned procedure        Malu Garcia MD  Chronic Pain Fellow  [facility]    "

## 2024-08-22 ENCOUNTER — PHARMACY VISIT (OUTPATIENT)
Dept: PHARMACY | Facility: CLINIC | Age: 69
End: 2024-08-22
Payer: MEDICARE

## 2024-08-22 PROCEDURE — RXMED WILLOW AMBULATORY MEDICATION CHARGE

## 2024-08-22 NOTE — ADDENDUM NOTE
Encounter addended by: Veronica Giron RN on: 8/22/2024 11:06 AM   Actions taken: Contacts section saved, Flowsheet accepted

## 2024-08-23 ENCOUNTER — PHARMACY VISIT (OUTPATIENT)
Dept: PHARMACY | Facility: CLINIC | Age: 69
End: 2024-08-23
Payer: MEDICARE

## 2024-08-26 PROCEDURE — RXMED WILLOW AMBULATORY MEDICATION CHARGE

## 2024-08-29 ENCOUNTER — PHARMACY VISIT (OUTPATIENT)
Dept: PHARMACY | Facility: CLINIC | Age: 69
End: 2024-08-29
Payer: MEDICARE

## 2024-08-31 PROCEDURE — RXMED WILLOW AMBULATORY MEDICATION CHARGE

## 2024-09-04 ENCOUNTER — PHARMACY VISIT (OUTPATIENT)
Dept: PHARMACY | Facility: CLINIC | Age: 69
End: 2024-09-04
Payer: MEDICARE

## 2024-09-16 ENCOUNTER — APPOINTMENT (OUTPATIENT)
Dept: PRIMARY CARE | Facility: CLINIC | Age: 69
End: 2024-09-16
Payer: MEDICARE

## 2024-09-16 VITALS — WEIGHT: 253.8 LBS | BODY MASS INDEX: 44.25 KG/M2

## 2024-09-16 DIAGNOSIS — E11.9 TYPE 2 DIABETES MELLITUS WITHOUT COMPLICATION, WITH LONG-TERM CURRENT USE OF INSULIN (MULTI): Primary | ICD-10-CM

## 2024-09-16 DIAGNOSIS — Z79.4 TYPE 2 DIABETES MELLITUS WITHOUT COMPLICATION, WITH LONG-TERM CURRENT USE OF INSULIN (MULTI): Primary | ICD-10-CM

## 2024-09-16 DIAGNOSIS — E11.9 TYPE 2 DIABETES MELLITUS WITHOUT COMPLICATION, WITHOUT LONG-TERM CURRENT USE OF INSULIN (MULTI): ICD-10-CM

## 2024-09-16 PROCEDURE — RXMED WILLOW AMBULATORY MEDICATION CHARGE

## 2024-09-16 RX ORDER — BLOOD-GLUCOSE SENSOR
EACH MISCELLANEOUS
Qty: 6 EACH | Refills: 3 | Status: SHIPPED | OUTPATIENT
Start: 2024-09-16

## 2024-09-16 RX ORDER — TIRZEPATIDE 5 MG/.5ML
5 INJECTION, SOLUTION SUBCUTANEOUS
Qty: 2 ML | Refills: 3 | Status: SHIPPED | OUTPATIENT
Start: 2024-09-16 | End: 2025-01-06

## 2024-09-16 RX ORDER — TIRZEPATIDE 5 MG/.5ML
5 INJECTION, SOLUTION SUBCUTANEOUS
Qty: 2 ML | Refills: 3 | Status: SHIPPED | OUTPATIENT
Start: 2024-09-16 | End: 2024-09-16

## 2024-09-16 NOTE — PROGRESS NOTES
"DM FOLLOW UP  E11.9    Katya Kaplan \"Mariam\" presents to the office for her DM review. Referring Provider: Janna Lux, APRN-CNP     HPI  Pt suffers with back pain. Knows reasonable carb allowances. Weight watchers x 3 months. Has lost 22 pounds in this time.       CURRENT DM PHARMACOTHERAPY   Lantus 40 units daily   Mounjaro 2.5mg weekly     Pt denies SE/intolerances.  Reviewed all medications by prescribing practitioner (such as prescriptions, OTCs, herbal therapies and supplements) and documented in the medical record.         Primary/Secondary Prevention   - Statin? No, taking Zetia  - ACE-I/ARB? No  - Aspirin? No    Summary of CGM Findings:  Patient is using: Vernell 3 plus     Name: Katya Kaplan  YOB: 1955  Report Period: 09/03/2024 - 09/16/2024 (14 days)  Generated: 09/16/2024  % Time CGM Active: 97%    Glucose Statistics and Targets  Average Glucose: 95 mg/dL  Glucose Management Indicator (GMI): 5.6%  Glucose Variability (%CV): 19.8%  Target Range: 70 - 180 mg/dL    Time in Ranges  Very High: >250 mg/dL --- 0%  High: 181 - 250 mg/dL --- 0%  Target Range: 70 - 180 mg/dL --- 98%  Low: 54 - 69 mg/dL --- 2%  Very Low: <54 mg/dL --- 0%    Pt experiencing nocturnal hypoglycemia.      Last Labs/Vitals/Meds  Hemoglobin A1C (%)   Date Value   08/13/2024 6.7 (H)   09/01/2023 8.6 (H)   04/01/2022 6.2 (A)   11/10/2021 7.8 (H)     Glucose (mg/dL)   Date Value   08/13/2024 154 (H)     Creatinine (mg/dL)   Date Value   08/13/2024 0.69     eGFR (mL/min/1.73m*2)   Date Value   08/13/2024 >90       BP Readings from Last 6 Encounters:   08/21/24 112/55   08/14/24 120/62   07/09/24 107/77   06/23/24 139/84   05/31/24 112/81   09/22/23 124/82        Wt Readings from Last 6 Encounters:   08/21/24 116 kg (255 lb)   08/14/24 116 kg (255 lb)   07/23/24 118 kg (260 lb)   06/23/24 118 kg (260 lb 2.3 oz)   05/31/24 119 kg (262 lb 6.4 oz)   09/22/23 126 kg (278 lb)       Current Outpatient Medications on File Prior to " "Visit   Medication Sig Dispense Refill    albuterol 2.5 mg /3 mL (0.083 %) nebulizer solution Use 1 vial (2.5 mg) by nebulization every 6 hours if needed for wheezing. 75 mL 0    albuterol 90 mcg/actuation inhaler INHALE 2 PUFFS BY MOUTH FOUR TIMES A DAY AS NEEDED FOR WHEEZING 6.7 g 4    ammonium lactate (Lac-Hydrin) 12 % lotion See Instructions, Instructions: APPLY TOPICALLY TO THE AFFECTED AREA(S) EVERY DAY, # 225 unknown unit, Type: Soft Stop, Pharmacy: [id number], APPLY TOPICALLY TO THE AFFECTED AREA(S) EVERY DAY      apixaban (Eliquis) 5 mg tablet TAKE 1 TABLET BY MOUTH TWO TIMES A  tablet 0    blood-glucose meter,continuous (FreeStyle Vernell 3 [place]) [facility] Use as instructed 1 each 0    blood-glucose sensor (FreeStyle Vernell 3 Sensor) device Change sensor every 14 days as directed 6 each 3    diclofenac sodium (Voltaren) 1 % gel Apply 4.5 inches (4 g) topically 2 times a day as needed.      evolocumab (Repatha SureClick) 140 mg/mL injection Inject 1 mL (140 mg) under the skin every 14 (fourteen) days. 2 mL 11    ezetimibe (Zetia) 10 mg tablet Take 1 tablet (10 mg) by mouth once daily. 90 tablet 3    fluticasone furoate-vilanteroL (Breo Ellipta) 200-25 mcg/dose inhaler INHALE 1 PUFF BY MOUTH ONE TIME DAILY 180 each 1    gabapentin (Neurontin) 300 mg capsule Take 2 capsules (600 mg) by mouth 3 times a day. 180 capsule 3    insulin glargine (Lantus Solostar U-100 Insulin) 100 unit/mL (3 mL) pen Inject 40 Units under the skin once daily at bedtime. 15 mL 1    metoprolol succinate XL (Toprol-XL) 50 mg 24 hr tablet Take 1 tablet (50 mg) by mouth once daily. 90 tablet 3    omega-3 acid ethyl esters (Lovaza) 1 gram capsule Omega-3-acid Ethyl Esters 1 GM Oral Capsule   Quantity: 30  Refills: 0        Start : 4-Apr-2022   Active      omeprazole (PriLOSEC) 40 mg DR capsule Take 1 capsule (40 mg) by mouth once daily.      pen needle, diabetic 32 gauge x 5/32\" needle Use daily with insulin injection. 100 " each 2    ranolazine (Ranexa) 500 mg 12 hr tablet TAKE 1 TABLET BY MOUTH TWO TIMES A  tablet 3    spironolactone (Aldactone) 25 mg tablet Take 1 tablet (25 mg) by mouth once daily.      tirzepatide (Mounjaro) 2.5 mg/0.5 mL pen injector Inject 2.5 mg under the skin every 7 days for 28 days. Inject 2.5mg under the skin once weekly for 4 weeks 2 mL 0     No current facility-administered medications on file prior to visit.       Lifestyle:  Current diet:  weight watchers   Current exercise: no regular exercise    Assessment/Plan:   Suggest increase GLP-1 dose for better glycemic control, weight loss  Decrease basal insulin from 40 units daily to now 30 units daily   Follow up with PCP as prev scheduled       Plan:  1.  START Mounjaro 5mg once weekly   2.  Follow up 3 months   Continue all meds under the continuation of care with the referring provider and clinical pharmacy team.    Data reviewed and evaluated by DONOVAN Raza RPh, CDCES  Treatment and plan changes discussed with Janna Lux, MODESTO-CNP

## 2024-09-16 NOTE — PATIENT INSTRUCTIONS
DECREASE Lantus to 30 units daily   START Mounjaro 5mg once weekly   3.  Call [employer] customer service to replace sensor [phone]

## 2024-09-17 PROCEDURE — RXMED WILLOW AMBULATORY MEDICATION CHARGE

## 2024-09-18 ENCOUNTER — PHARMACY VISIT (OUTPATIENT)
Dept: PHARMACY | Facility: CLINIC | Age: 69
End: 2024-09-18
Payer: MEDICARE

## 2024-09-19 ENCOUNTER — PHARMACY VISIT (OUTPATIENT)
Dept: PHARMACY | Facility: CLINIC | Age: 69
End: 2024-09-19
Payer: MEDICARE

## 2024-09-19 PROCEDURE — RXMED WILLOW AMBULATORY MEDICATION CHARGE

## 2024-09-23 PROCEDURE — RXMED WILLOW AMBULATORY MEDICATION CHARGE

## 2024-09-24 ENCOUNTER — PHARMACY VISIT (OUTPATIENT)
Dept: PHARMACY | Facility: CLINIC | Age: 69
End: 2024-09-24
Payer: MEDICARE

## 2024-09-25 ENCOUNTER — PHARMACY VISIT (OUTPATIENT)
Dept: PHARMACY | Facility: CLINIC | Age: 69
End: 2024-09-25
Payer: MEDICARE

## 2024-09-25 ENCOUNTER — APPOINTMENT (OUTPATIENT)
Dept: ORTHOPEDIC SURGERY | Facility: CLINIC | Age: 69
End: 2024-09-25
Payer: MEDICARE

## 2024-09-25 DIAGNOSIS — M25.551 ACUTE HIP PAIN, BILATERAL: ICD-10-CM

## 2024-09-25 DIAGNOSIS — M25.552 ACUTE HIP PAIN, BILATERAL: ICD-10-CM

## 2024-09-25 DIAGNOSIS — M17.12 ARTHRITIS OF LEFT KNEE: Primary | ICD-10-CM

## 2024-09-25 PROCEDURE — 1036F TOBACCO NON-USER: CPT | Performed by: ORTHOPAEDIC SURGERY

## 2024-09-25 PROCEDURE — 20610 DRAIN/INJ JOINT/BURSA W/O US: CPT | Performed by: ORTHOPAEDIC SURGERY

## 2024-09-25 PROCEDURE — 99214 OFFICE O/P EST MOD 30 MIN: CPT | Performed by: ORTHOPAEDIC SURGERY

## 2024-09-25 PROCEDURE — 1160F RVW MEDS BY RX/DR IN RCRD: CPT | Performed by: ORTHOPAEDIC SURGERY

## 2024-09-25 PROCEDURE — RXMED WILLOW AMBULATORY MEDICATION CHARGE

## 2024-09-25 PROCEDURE — 3044F HG A1C LEVEL LT 7.0%: CPT | Performed by: ORTHOPAEDIC SURGERY

## 2024-09-25 PROCEDURE — 1159F MED LIST DOCD IN RCRD: CPT | Performed by: ORTHOPAEDIC SURGERY

## 2024-09-25 PROCEDURE — 1125F AMNT PAIN NOTED PAIN PRSNT: CPT | Performed by: ORTHOPAEDIC SURGERY

## 2024-09-25 RX ORDER — HYALURONATE SODIUM, STABILIZED 60 MG/3 ML
60 SYRINGE (ML) INTRAARTICULAR ONCE
Qty: 3 ML | Refills: 0 | Status: SHIPPED | OUTPATIENT
Start: 2024-09-25 | End: 2024-09-28

## 2024-09-25 RX ORDER — TRIAMCINOLONE ACETONIDE 40 MG/ML
1 INJECTION, SUSPENSION INTRA-ARTICULAR; INTRAMUSCULAR
Status: COMPLETED | OUTPATIENT
Start: 2024-09-25 | End: 2024-09-25

## 2024-09-25 ASSESSMENT — PAIN SCALES - GENERAL: PAINLEVEL_OUTOF10: 3

## 2024-09-25 ASSESSMENT — PAIN - FUNCTIONAL ASSESSMENT: PAIN_FUNCTIONAL_ASSESSMENT: 0-10

## 2024-09-25 NOTE — PROGRESS NOTES
This is a consultation from Dr. Janna Lux, APRN-CNP for   Chief Complaint   Patient presents with    Left Knee - Pain       This is a 69 y.o. female who presents for Follow-up for her left knee.  Patient states she has had left knee pain for a long time, she had injection several months ago.  They were helpful and did last for a while but in the last few weeks they have worn off.  She has had return of her symptoms exacerbation of her pain, sharp pain over the medial knee worse with walking.  Improves with rest.  No numbness or tingling no fevers or chills no shooting pain down the leg.    Physical Exam    There has been no interval change in this patient's past medical, surgical, medications, allergies, family history or social history since the most recent visit to a provider within our department. 14 point review of systems was performed, reviewed, and negative except for pertinent positives documented in the history of present illness.     Constitutional: well developed, well nourished female in no acute distress  Psychiatric: normal mood, appropriate affect  Eyes: sclera anicteric  HENT: normocephalic/atraumatic  CV: regular rate and rhythm   Respiratory: non labored breathing  Integumentary: no rash  Neurological: moves all extremities    Left knee exam: skin intact no lacerations or abrations. no effusion.  Tender medial joint line. negative log roll negative patellar grind. ROM 0-120. stable to varus and valgus stress at 0 and 30 degrees. negative lachman negative posterior drawer negative timothy. 5/5 ehl/fhl/gs/ta. silt s/s/sp/dp/t. 2+ dp/pt        Xrays were ordered by me, they were reviewed and independently interpreted by me today, they show severe degenerative changes in the left knee    L Inj/Asp: L knee on 9/25/2024 8:47 AM  Indications: pain and joint swelling  Details: 22 G needle, anterolateral approach  Medications: 1 mL triamcinolone acetonide 40 mg/mL    Discussion:  I discussed the  conservative treatment options for knee osteoarthritis including but not limited to physical therapy, oral NSAIDS, activity and lifestyle modification, and corticosteroid injections. Pt has elected to undergo a cortisone injection today. I have explained the risk and benefits of an injection including the possibility of joint infection, bleeding, damage to cartilage, allergic reaction. Patient verbalized understanding and gave verbal consent wishes to proceed with a intra-articular cortisone injection for their knee.    Procedure:  After discussing the risk and benefits of the procedure, we proceeded with an intra-articular left knee injection. We discussed the risks and benefits and potential morbidity related to the treatment, and to the prescription medication administered in the injection    With the patient's informed verbal consent, the left knee was prepped in standard sterile fashion with Chlorhexidine. The skin was then anesthetized with ethyl chloride spray and cleaned again with Chlorhexidine. The knee was then apirated/injected with a prefilled 20-gauge syringe of 40 mg Kenalog + 4 ml Lidocaine using the lateral approach without complications.  The patient tolerated this well and felt immediate initial relief of symptoms. A bandaid was applied and the patient ambulated out of the clinic on ther own accord without difficulty. Patient was instructed to avoid physical activity for 24-48 hours to prevent the knees from swelling and may ice the knees as tolerated. Patient should contact the office if any signs of of infection appear: redness, fever, chills, drainage, swelling or warmth to the knees.  Pt understands that the injections can be repeated no sooner than 3 months.  Procedure, treatment alternatives, risks and benefits explained, specific risks discussed. Consent was given by the patient. Immediately prior to procedure a time out was called to verify the correct patient, procedure, equipment,  "support staff and site/side marked as required. Patient was prepped and draped in the usual sterile fashion.             Impression/Plan: This is a 69 y.o. female with left knee arthritis.  I had an in depth discussion with the patient regarding treatment options for arthritis and their relative risks and benefits. We reviewed surgical and nonsurgical option for treatment. Treatments include anti inflammatory medications, physical therapy, weight loss, activity modification, use of assistive devices, injection therapies. We discussed current prescriptions and risks and benefits of continuation of prescription medication as apporpriate. We discussed that arthritis is often progressive over time, an in end stage arthritis surgical interventions can be considered, including arthroplasty. All questions were answered and the patient voiced their understanding.  Will get her set up a gel injection, I will see her back when its available.    BMI Readings from Last 1 Encounters:   09/16/24 44.25 kg/m²      Lab Results   Component Value Date    CREATININE 0.69 08/13/2024     Tobacco Use: Medium Risk (9/25/2024)    Patient History     Smoking Tobacco Use: Former     Smokeless Tobacco Use: Never     Passive Exposure: Past      Computed MELD 3.0 unavailable. One or more values for this score either were not found within the given timeframe or did not fit some other criterion.  Computed MELD-Na unavailable. One or more values for this score either were not found within the given timeframe or did not fit some other criterion.       Lab Results   Component Value Date    HGBA1C 6.7 (H) 08/13/2024     No results found for: \"STAPHMRSASCR\"  "

## 2024-09-27 ENCOUNTER — SPECIALTY PHARMACY (OUTPATIENT)
Dept: PHARMACY | Facility: CLINIC | Age: 69
End: 2024-09-27

## 2024-10-02 PROCEDURE — RXMED WILLOW AMBULATORY MEDICATION CHARGE

## 2024-10-03 ENCOUNTER — PHARMACY VISIT (OUTPATIENT)
Dept: PHARMACY | Facility: CLINIC | Age: 69
End: 2024-10-03
Payer: MEDICARE

## 2024-10-07 ENCOUNTER — PHARMACY VISIT (OUTPATIENT)
Dept: PHARMACY | Facility: CLINIC | Age: 69
End: 2024-10-07
Payer: MEDICARE

## 2024-10-07 DIAGNOSIS — J44.9 COPD MIXED TYPE (MULTI): ICD-10-CM

## 2024-10-07 PROCEDURE — RXMED WILLOW AMBULATORY MEDICATION CHARGE

## 2024-10-07 RX ORDER — ALBUTEROL SULFATE 90 UG/1
INHALANT RESPIRATORY (INHALATION)
Qty: 6.7 G | Refills: 4 | Status: SHIPPED | OUTPATIENT
Start: 2024-10-07 | End: 2025-10-07

## 2024-10-09 ENCOUNTER — TELEPHONE (OUTPATIENT)
Dept: ORTHOPEDIC SURGERY | Facility: CLINIC | Age: 69
End: 2024-10-09
Payer: MEDICARE

## 2024-10-09 NOTE — TELEPHONE ENCOUNTER
----- Message from Tonie RAY sent at 10/9/2024  9:54 AM EDT -----  Regarding: Durolane  Good morning,    I wanted to make you aware that the above patient's Durolane is covered by plan and remaining copay is covered by assistance we have in place for her.  I called pt and made her aware she indicated that she was unsure if and when the Durolane would be needed as she recently had a cortisone injection she stated that is currently working.  If the time comes for the  Durolane just reach out to [facility] and we can certainly get it out to the office.      Thank you

## 2024-10-10 PROCEDURE — RXMED WILLOW AMBULATORY MEDICATION CHARGE

## 2024-10-11 PROCEDURE — RXMED WILLOW AMBULATORY MEDICATION CHARGE

## 2024-10-13 PROCEDURE — RXMED WILLOW AMBULATORY MEDICATION CHARGE

## 2024-10-14 ENCOUNTER — PHARMACY VISIT (OUTPATIENT)
Dept: PHARMACY | Facility: CLINIC | Age: 69
End: 2024-10-14
Payer: MEDICARE

## 2024-10-15 ENCOUNTER — PHARMACY VISIT (OUTPATIENT)
Dept: PHARMACY | Facility: CLINIC | Age: 69
End: 2024-10-15
Payer: MEDICARE

## 2024-10-18 ENCOUNTER — PHARMACY VISIT (OUTPATIENT)
Dept: PHARMACY | Facility: CLINIC | Age: 69
End: 2024-10-18
Payer: MEDICARE

## 2024-10-21 PROCEDURE — RXMED WILLOW AMBULATORY MEDICATION CHARGE

## 2024-10-22 ENCOUNTER — PHARMACY VISIT (OUTPATIENT)
Dept: PHARMACY | Facility: CLINIC | Age: 69
End: 2024-10-22
Payer: MEDICARE

## 2024-10-26 DIAGNOSIS — E11.9 TYPE 2 DIABETES MELLITUS WITHOUT COMPLICATION, WITHOUT LONG-TERM CURRENT USE OF INSULIN (MULTI): ICD-10-CM

## 2024-10-28 DIAGNOSIS — M48.062 LUMBAR STENOSIS WITH NEUROGENIC CLAUDICATION: ICD-10-CM

## 2024-10-28 DIAGNOSIS — M25.551 ACUTE HIP PAIN, BILATERAL: Primary | ICD-10-CM

## 2024-10-28 DIAGNOSIS — M25.552 ACUTE HIP PAIN, BILATERAL: Primary | ICD-10-CM

## 2024-10-28 PROCEDURE — RXMED WILLOW AMBULATORY MEDICATION CHARGE

## 2024-10-28 RX ORDER — INSULIN GLARGINE 100 [IU]/ML
40 INJECTION, SOLUTION SUBCUTANEOUS NIGHTLY
Qty: 15 ML | Refills: 1 | Status: SHIPPED | OUTPATIENT
Start: 2024-10-28 | End: 2025-10-28

## 2024-10-28 RX ORDER — GABAPENTIN 300 MG/1
600 CAPSULE ORAL 3 TIMES DAILY
Qty: 180 CAPSULE | Refills: 3 | Status: SHIPPED | OUTPATIENT
Start: 2024-10-28 | End: 2025-02-25

## 2024-10-29 ENCOUNTER — PHARMACY VISIT (OUTPATIENT)
Dept: PHARMACY | Facility: CLINIC | Age: 69
End: 2024-10-29
Payer: MEDICARE

## 2024-10-29 ENCOUNTER — HOSPITAL ENCOUNTER (OUTPATIENT)
Dept: RADIOLOGY | Facility: HOSPITAL | Age: 69
Discharge: HOME | End: 2024-10-29
Payer: MEDICARE

## 2024-10-29 DIAGNOSIS — M25.552 ACUTE HIP PAIN, BILATERAL: ICD-10-CM

## 2024-10-29 DIAGNOSIS — M25.551 ACUTE HIP PAIN, BILATERAL: ICD-10-CM

## 2024-10-29 PROCEDURE — 20610 DRAIN/INJ JOINT/BURSA W/O US: CPT | Mod: RT

## 2024-10-29 PROCEDURE — 2550000001 HC RX 255 CONTRASTS: Performed by: ORTHOPAEDIC SURGERY

## 2024-10-29 PROCEDURE — 77002 NEEDLE LOCALIZATION BY XRAY: CPT | Mod: RIGHT SIDE | Performed by: RADIOLOGY

## 2024-10-29 PROCEDURE — 96372 THER/PROPH/DIAG INJ SC/IM: CPT | Performed by: ORTHOPAEDIC SURGERY

## 2024-10-29 PROCEDURE — 20610 DRAIN/INJ JOINT/BURSA W/O US: CPT | Mod: RIGHT SIDE | Performed by: RADIOLOGY

## 2024-10-29 PROCEDURE — 2500000004 HC RX 250 GENERAL PHARMACY W/ HCPCS (ALT 636 FOR OP/ED): Mod: JW | Performed by: ORTHOPAEDIC SURGERY

## 2024-10-29 RX ORDER — LIDOCAINE HYDROCHLORIDE 10 MG/ML
5 INJECTION, SOLUTION EPIDURAL; INFILTRATION; INTRACAUDAL; PERINEURAL ONCE
Status: COMPLETED | OUTPATIENT
Start: 2024-10-29 | End: 2024-10-29

## 2024-10-29 RX ORDER — BUPIVACAINE HYDROCHLORIDE 5 MG/ML
5 INJECTION, SOLUTION EPIDURAL; INTRACAUDAL ONCE
Status: COMPLETED | OUTPATIENT
Start: 2024-10-29 | End: 2024-10-29

## 2024-10-29 RX ORDER — TRIAMCINOLONE ACETONIDE 40 MG/ML
40 INJECTION, SUSPENSION INTRA-ARTICULAR; INTRAMUSCULAR ONCE
Status: COMPLETED | OUTPATIENT
Start: 2024-10-29 | End: 2024-10-29

## 2024-10-29 ASSESSMENT — PAIN SCALES - GENERAL
PAINLEVEL_OUTOF10: 0 - NO PAIN
PAINLEVEL_OUTOF10: 0 - NO PAIN

## 2024-10-31 PROCEDURE — RXMED WILLOW AMBULATORY MEDICATION CHARGE

## 2024-11-01 ENCOUNTER — LAB (OUTPATIENT)
Dept: LAB | Facility: LAB | Age: 69
End: 2024-11-01
Payer: MEDICARE

## 2024-11-01 DIAGNOSIS — E78.2 MIXED HYPERLIPIDEMIA: ICD-10-CM

## 2024-11-01 DIAGNOSIS — E11.9 TYPE 2 DIABETES MELLITUS WITHOUT COMPLICATION, WITH LONG-TERM CURRENT USE OF INSULIN (MULTI): ICD-10-CM

## 2024-11-01 DIAGNOSIS — Z79.4 TYPE 2 DIABETES MELLITUS WITHOUT COMPLICATION, WITH LONG-TERM CURRENT USE OF INSULIN (MULTI): ICD-10-CM

## 2024-11-01 LAB
ALBUMIN SERPL BCP-MCNC: 4.1 G/DL (ref 3.4–5)
ALP SERPL-CCNC: 57 U/L (ref 33–136)
ALT SERPL W P-5'-P-CCNC: 13 U/L (ref 7–45)
ANION GAP SERPL CALC-SCNC: 14 MMOL/L (ref 10–20)
AST SERPL W P-5'-P-CCNC: 10 U/L (ref 9–39)
BASOPHILS # BLD AUTO: 0.02 X10*3/UL (ref 0–0.1)
BASOPHILS NFR BLD AUTO: 0.1 %
BILIRUB SERPL-MCNC: 0.7 MG/DL (ref 0–1.2)
BUN SERPL-MCNC: 30 MG/DL (ref 6–23)
CALCIUM SERPL-MCNC: 9.3 MG/DL (ref 8.6–10.6)
CHLORIDE SERPL-SCNC: 103 MMOL/L (ref 98–107)
CHOLEST SERPL-MCNC: 139 MG/DL (ref 0–199)
CHOLESTEROL/HDL RATIO: 4
CO2 SERPL-SCNC: 29 MMOL/L (ref 21–32)
CREAT SERPL-MCNC: 0.99 MG/DL (ref 0.5–1.05)
EGFRCR SERPLBLD CKD-EPI 2021: 61 ML/MIN/1.73M*2
EOSINOPHIL # BLD AUTO: 0 X10*3/UL (ref 0–0.7)
EOSINOPHIL NFR BLD AUTO: 0 %
ERYTHROCYTE [DISTWIDTH] IN BLOOD BY AUTOMATED COUNT: 13.6 % (ref 11.5–14.5)
EST. AVERAGE GLUCOSE BLD GHB EST-MCNC: 103 MG/DL
GLUCOSE SERPL-MCNC: 68 MG/DL (ref 74–99)
HBA1C MFR BLD: 5.2 %
HCT VFR BLD AUTO: 51.2 % (ref 36–46)
HDLC SERPL-MCNC: 35 MG/DL
HGB BLD-MCNC: 16.7 G/DL (ref 12–16)
IMM GRANULOCYTES # BLD AUTO: 0.18 X10*3/UL (ref 0–0.7)
IMM GRANULOCYTES NFR BLD AUTO: 1 % (ref 0–0.9)
LDLC SERPL CALC-MCNC: 76 MG/DL
LYMPHOCYTES # BLD AUTO: 2.25 X10*3/UL (ref 1.2–4.8)
LYMPHOCYTES NFR BLD AUTO: 12.8 %
MCH RBC QN AUTO: 30.5 PG (ref 26–34)
MCHC RBC AUTO-ENTMCNC: 32.6 G/DL (ref 32–36)
MCV RBC AUTO: 93 FL (ref 80–100)
MONOCYTES # BLD AUTO: 1.2 X10*3/UL (ref 0.1–1)
MONOCYTES NFR BLD AUTO: 6.9 %
NEUTROPHILS # BLD AUTO: 13.86 X10*3/UL (ref 1.2–7.7)
NEUTROPHILS NFR BLD AUTO: 79.2 %
NON HDL CHOLESTEROL: 104 MG/DL (ref 0–149)
NRBC BLD-RTO: 0 /100 WBCS (ref 0–0)
PLATELET # BLD AUTO: 295 X10*3/UL (ref 150–450)
POTASSIUM SERPL-SCNC: 4.3 MMOL/L (ref 3.5–5.3)
PROT SERPL-MCNC: 6.7 G/DL (ref 6.4–8.2)
RBC # BLD AUTO: 5.48 X10*6/UL (ref 4–5.2)
SODIUM SERPL-SCNC: 142 MMOL/L (ref 136–145)
TRIGL SERPL-MCNC: 140 MG/DL (ref 0–149)
VLDL: 28 MG/DL (ref 0–40)
WBC # BLD AUTO: 17.5 X10*3/UL (ref 4.4–11.3)

## 2024-11-01 PROCEDURE — 85025 COMPLETE CBC W/AUTO DIFF WBC: CPT

## 2024-11-01 PROCEDURE — 80053 COMPREHEN METABOLIC PANEL: CPT

## 2024-11-01 PROCEDURE — 80061 LIPID PANEL: CPT

## 2024-11-01 PROCEDURE — 36415 COLL VENOUS BLD VENIPUNCTURE: CPT

## 2024-11-01 PROCEDURE — 83036 HEMOGLOBIN GLYCOSYLATED A1C: CPT

## 2024-11-04 ENCOUNTER — LAB (OUTPATIENT)
Dept: LAB | Facility: LAB | Age: 69
End: 2024-11-04
Payer: MEDICARE

## 2024-11-04 ENCOUNTER — PHARMACY VISIT (OUTPATIENT)
Dept: PHARMACY | Facility: CLINIC | Age: 69
End: 2024-11-04
Payer: MEDICARE

## 2024-11-04 LAB
APPEARANCE UR: ABNORMAL
BACTERIA #/AREA URNS AUTO: ABNORMAL /HPF
BILIRUB UR STRIP.AUTO-MCNC: NEGATIVE MG/DL
COLOR UR: YELLOW
CREAT UR-MCNC: 90.6 MG/DL (ref 20–320)
GLUCOSE UR STRIP.AUTO-MCNC: NORMAL MG/DL
KETONES UR STRIP.AUTO-MCNC: NEGATIVE MG/DL
LEUKOCYTE ESTERASE UR QL STRIP.AUTO: ABNORMAL
MICROALBUMIN UR-MCNC: 22.7 MG/L
MICROALBUMIN/CREAT UR: 25.1 UG/MG CREAT
MUCOUS THREADS #/AREA URNS AUTO: ABNORMAL /LPF
NITRITE UR QL STRIP.AUTO: NEGATIVE
PH UR STRIP.AUTO: 6 [PH]
PROT UR STRIP.AUTO-MCNC: ABNORMAL MG/DL
RBC # UR STRIP.AUTO: NEGATIVE /UL
RBC #/AREA URNS AUTO: ABNORMAL /HPF
SP GR UR STRIP.AUTO: 1.03
SQUAMOUS #/AREA URNS AUTO: ABNORMAL /HPF
UROBILINOGEN UR STRIP.AUTO-MCNC: ABNORMAL MG/DL
WBC #/AREA URNS AUTO: >50 /HPF

## 2024-11-04 PROCEDURE — 82043 UR ALBUMIN QUANTITATIVE: CPT

## 2024-11-04 PROCEDURE — 82570 ASSAY OF URINE CREATININE: CPT

## 2024-11-04 PROCEDURE — 81001 URINALYSIS AUTO W/SCOPE: CPT

## 2024-11-06 ENCOUNTER — TELEPHONE (OUTPATIENT)
Dept: PRIMARY CARE | Facility: CLINIC | Age: 69
End: 2024-11-06

## 2024-11-06 ENCOUNTER — APPOINTMENT (OUTPATIENT)
Dept: PRIMARY CARE | Facility: CLINIC | Age: 69
End: 2024-11-06
Payer: MEDICARE

## 2024-11-06 VITALS
WEIGHT: 246 LBS | BODY MASS INDEX: 42 KG/M2 | HEART RATE: 79 BPM | OXYGEN SATURATION: 99 % | DIASTOLIC BLOOD PRESSURE: 84 MMHG | TEMPERATURE: 96.7 F | SYSTOLIC BLOOD PRESSURE: 140 MMHG | HEIGHT: 64 IN

## 2024-11-06 DIAGNOSIS — I10 BENIGN ESSENTIAL HYPERTENSION: Primary | ICD-10-CM

## 2024-11-06 DIAGNOSIS — R71.8 ELEVATED RED BLOOD CELL COUNT: ICD-10-CM

## 2024-11-06 DIAGNOSIS — Z79.4 TYPE 2 DIABETES MELLITUS WITHOUT COMPLICATION, WITH LONG-TERM CURRENT USE OF INSULIN (MULTI): ICD-10-CM

## 2024-11-06 DIAGNOSIS — E11.9 TYPE 2 DIABETES MELLITUS WITHOUT COMPLICATION, WITH LONG-TERM CURRENT USE OF INSULIN (MULTI): ICD-10-CM

## 2024-11-06 DIAGNOSIS — Z00.00 WELL ADULT EXAM: Primary | ICD-10-CM

## 2024-11-06 DIAGNOSIS — Z78.0 POST-MENOPAUSAL: ICD-10-CM

## 2024-11-06 DIAGNOSIS — R39.15 URINARY URGENCY: ICD-10-CM

## 2024-11-06 DIAGNOSIS — J44.9 COPD MIXED TYPE (MULTI): ICD-10-CM

## 2024-11-06 DIAGNOSIS — Z12.31 ENCOUNTER FOR SCREENING MAMMOGRAM FOR MALIGNANT NEOPLASM OF BREAST: ICD-10-CM

## 2024-11-06 DIAGNOSIS — E78.2 MIXED HYPERLIPIDEMIA: ICD-10-CM

## 2024-11-06 DIAGNOSIS — I10 BENIGN ESSENTIAL HYPERTENSION: ICD-10-CM

## 2024-11-06 DIAGNOSIS — M19.90 OSTEOARTHRITIS, UNSPECIFIED OSTEOARTHRITIS TYPE, UNSPECIFIED SITE: ICD-10-CM

## 2024-11-06 PROCEDURE — 3077F SYST BP >= 140 MM HG: CPT | Performed by: NURSE PRACTITIONER

## 2024-11-06 PROCEDURE — 1158F ADVNC CARE PLAN TLK DOCD: CPT | Performed by: NURSE PRACTITIONER

## 2024-11-06 PROCEDURE — 2033F EYE IMG VALID W/O RTNOPTHY: CPT | Performed by: NURSE PRACTITIONER

## 2024-11-06 PROCEDURE — 3079F DIAST BP 80-89 MM HG: CPT | Performed by: NURSE PRACTITIONER

## 2024-11-06 PROCEDURE — 3048F LDL-C <100 MG/DL: CPT | Performed by: NURSE PRACTITIONER

## 2024-11-06 PROCEDURE — 3008F BODY MASS INDEX DOCD: CPT | Performed by: NURSE PRACTITIONER

## 2024-11-06 PROCEDURE — 99397 PER PM REEVAL EST PAT 65+ YR: CPT | Performed by: NURSE PRACTITIONER

## 2024-11-06 PROCEDURE — G0439 PPPS, SUBSEQ VISIT: HCPCS | Performed by: NURSE PRACTITIONER

## 2024-11-06 PROCEDURE — 1159F MED LIST DOCD IN RCRD: CPT | Performed by: NURSE PRACTITIONER

## 2024-11-06 PROCEDURE — 1123F ACP DISCUSS/DSCN MKR DOCD: CPT | Performed by: NURSE PRACTITIONER

## 2024-11-06 PROCEDURE — 92229 IMG RTA DETC/MNTR DS POC ALY: CPT | Performed by: NURSE PRACTITIONER

## 2024-11-06 PROCEDURE — RXMED WILLOW AMBULATORY MEDICATION CHARGE

## 2024-11-06 PROCEDURE — 1125F AMNT PAIN NOTED PAIN PRSNT: CPT | Performed by: NURSE PRACTITIONER

## 2024-11-06 PROCEDURE — 3044F HG A1C LEVEL LT 7.0%: CPT | Performed by: NURSE PRACTITIONER

## 2024-11-06 PROCEDURE — 3061F NEG MICROALBUMINURIA REV: CPT | Performed by: NURSE PRACTITIONER

## 2024-11-06 RX ORDER — NITROFURANTOIN 25; 75 MG/1; MG/1
100 CAPSULE ORAL 2 TIMES DAILY
Qty: 14 CAPSULE | Refills: 0 | Status: SHIPPED | OUTPATIENT
Start: 2024-11-06 | End: 2024-11-16

## 2024-11-06 ASSESSMENT — PAIN SCALES - GENERAL: PAINLEVEL_OUTOF10: 3

## 2024-11-06 NOTE — ASSESSMENT & PLAN NOTE
Glucose 68 with A1c 5.2  Continue medication as prescribed  Orders:    Diabetic Retinopathy Luminetics; Future

## 2024-11-06 NOTE — ASSESSMENT & PLAN NOTE
BP stable on   Continue current medications  Mediterranean diet/QUAN diet  Exercise  Safety  Monitor bp and record  Follow up 6 months

## 2024-11-06 NOTE — PROGRESS NOTES
Subjective   Reason for Visit: Katya Kaplan is an 69 y.o. female here for a Medicare Wellness visit.     Past Medical, Surgical, and Family History reviewed and updated in chart.    Reviewed all medications by prescribing practitioner or clinical pharmacist (such as prescriptions, OTCs, herbal therapies and supplements) and documented in the medical record.    HPI  Presents for Annual Wellness Visit. PMHx, FMHx, SHx, and Social history reviewed and updated in chart. PHQ2 reviewed. Mini-cog test reviewed. Advanced Care planning reviewed.  Self assessment of Health - fair to good  Issues with ADLs - none  Issues with IADLs -none  Issues with home safety -none  Uses rollator    Diet is good, watching carbs. Follows [employer]  Exercise limited  Former smoker - quit 1998    Immunizations reviewed    Colon cancer screening:  Colonoscopy 2017 Dr Merrill, normal with repeat 10 years. See media tab    Specialists:  Ortho Dr De Los Santos  Pain Med Dr Leonard  Cardiology Dr Esteban    Patient Care Team:  TRISTA Whitney as PCP - General (Family Medicine)  TRISTA Whitney as PCP - [employer]age PCP  TRISTA Whitney as Primary Care Provider  TRISTA Whitney MD (Inactive)     Review of Systems  Constitutional Symptoms: negative for fever, loss of appetite, headaches, fatigue.   Eyes: negative for loss and blurring of vision, double vision.   Ear, Nose, Mouth, Throat: negative for hearing loss, tinnitus, nasal congestion, rhinorrhea, nose bleeds, teeth problems, mouth sores, gum disease, dysphagia, sore throat.   Cardiovascular: negative for chest pain/pressure, palpitations, edema, claudication.   Respiratory: negative for shortness of breath, dyspnea on exertion, pain with breathing, coughing.   Breast: negative for tenderness, masses, gynecomastia.   Gastrointestinal: negative for anorexia, indigestion, nausea, vomiting, abdominal pain, change in bowel  "habits, diarrhea, constipation, hematochezia, melena, blood in stool.    : Negative for urinary or genital complaints  Musculoskeletal: negative for joint pain, joint swelling, myalgia, cramps.   Integumentary: negative for change in mole, skin trouble or rash.   Neurological: negative for headache, numbness, tingling, weakness, tremors.   Psychiatric: negative for depression, anxiety.   Endocrine: negative for weight gain, heat or cold intolerance, polyuria, polydipsia, polyphagia.   Hematologic/Lymphatic: negative for bruising, abnormal bleeding, swollen glands     Objective   Vitals:  /84 (BP Location: Left arm, Patient Position: Sitting, BP Cuff Size: Large adult)   Pulse 79   Temp 35.9 °C (96.7 °F) (Temporal)   Ht 1.613 m (5' 3.5\")   Wt 112 kg (246 lb)   SpO2 99%   BMI 42.89 kg/m²       Physical Exam  General Appearance: Comfortable. She is well nourished, and well developed. She is awake, alert, and oriented and appears her stated age. The patient is cooperative with exam.  Head: Hair pattern reveals a normal pattern for patient's age and The face shows no abnormalities.  Eyes: PERRLA, EOMI, conjunctiva and sclera clear. Extraocular muscle exam reveals EOMI.  Ears, Nose, Mouth, Throat: Bilateral canals are normal. Both tympanic membranes are pearly gray and landmarks normal.    Nasal mucosa in both nostrils reveals no polyps, ulcerations, or lesions. Oral mucosa reveals no abnormalities and Teeth reveal good repair.  Neck: Neck reveals supple, no adenopathy, no thyromegaly, or carotid bruits.  Chest: Lungs are clear to auscultation bilaterally with no wheezes, rales, or rhonchi.  Cardiovascular: RRR without MRG. No edema  Breast:  deferred  Abdomen: Abdomen obese with normoactive bowel sounds x4 quads, Abd is soft, NT, ND with no masses.  Genitourinary: deferred  Musculoskeletal: 5/5 and equal strength in bilateral upper and lower extremities.  Skin: Skin reveals good turgor and no " rashes.  Neurological: Intact and non-focal. Cranial nerves II - XII are grossly intact.  Psychiatric: Patient has appropriate judgement. Patient has good insight. Patient's mood is appropriate.     Assessment & Plan  Well adult exam  68 yo F well exam  Preventative screenings reviewed  Immunizations reviewed  Mediterranean diet, exercise, safety  Follow up 6 months and prn         Encounter for screening mammogram for malignant neoplasm of breast    Orders:    BI mammo bilateral screening tomosynthesis; Future    Post-menopausal  Be sure to take in 1200 mg of Calcium and 1000 units of Vitamin D daily.  Choose whole foods and limit caffeine/alcohol. Perform 20 minutes of daily weight bearing exercise such as walking.  Smoking cessation is encouraged for all.  Dexa ordered  Orders:    XR DEXA bone density; Future    Benign essential hypertension  BP stable on   Continue current medications  Mediterranean diet/QUAN diet  Exercise  Safety  Monitor bp and record  Follow up 6 months         Type 2 diabetes mellitus without complication, with long-term current use of insulin (Multi)  Glucose 68 with A1c 5.2  Continue medication as prescribed  Orders:    Diabetic Retinopathy Luminetics; Future    Urinary urgency  Urine with 1+ bacteria and leuk 250  Placed on macrobid due to complaints  Fluids, rest, bladder hygiene  Follow up 2 weeks for recheck  Orders:    nitrofurantoin, macrocrystal-monohydrate, (Macrobid) 100 mg capsule; Take 1 capsule (100 mg) by mouth 2 times a day for 7 days.    Elevated red blood cell count  Hgb 16.7  Referred to hematology  Orders:    Referral to Hematology and Oncology; Future    Mixed hyperlipidemia  Chol 139 LDL 76 HDL 35 Trigs 140  Cardiovascular risks discussed and, if needed, lifestyle modifications recommended, including nutritional choices, exercise, and elimination of habits contributing to risk.  We agreed on a plan to reduce the current cardiovascular risk.  Aspirin use/tissues was  discussed after reviewing updated guidelines  Continue zetia and cardiology care       COPD mixed type (Multi)  stable       Osteoarthritis, unspecified osteoarthritis type, unspecified site  By hx

## 2024-11-09 ENCOUNTER — PHARMACY VISIT (OUTPATIENT)
Dept: PHARMACY | Facility: CLINIC | Age: 69
End: 2024-11-09
Payer: MEDICARE

## 2024-11-12 ENCOUNTER — APPOINTMENT (OUTPATIENT)
Dept: CARDIOLOGY | Facility: CLINIC | Age: 69
End: 2024-11-12
Payer: MEDICARE

## 2024-11-17 PROCEDURE — RXMED WILLOW AMBULATORY MEDICATION CHARGE

## 2024-11-18 PROCEDURE — RXMED WILLOW AMBULATORY MEDICATION CHARGE

## 2024-11-19 ENCOUNTER — HOSPITAL ENCOUNTER (OUTPATIENT)
Dept: RADIOLOGY | Facility: CLINIC | Age: 69
Discharge: HOME | End: 2024-11-19
Payer: MEDICARE

## 2024-11-19 ENCOUNTER — APPOINTMENT (OUTPATIENT)
Dept: RADIOLOGY | Facility: CLINIC | Age: 69
End: 2024-11-19
Payer: MEDICARE

## 2024-11-19 VITALS — WEIGHT: 246 LBS | HEIGHT: 64 IN | BODY MASS INDEX: 42 KG/M2

## 2024-11-19 DIAGNOSIS — Z78.0 POST-MENOPAUSAL: ICD-10-CM

## 2024-11-19 DIAGNOSIS — Z12.31 ENCOUNTER FOR SCREENING MAMMOGRAM FOR MALIGNANT NEOPLASM OF BREAST: ICD-10-CM

## 2024-11-19 PROCEDURE — 77063 BREAST TOMOSYNTHESIS BI: CPT | Performed by: RADIOLOGY

## 2024-11-19 PROCEDURE — 77080 DXA BONE DENSITY AXIAL: CPT

## 2024-11-19 PROCEDURE — 77067 SCR MAMMO BI INCL CAD: CPT | Performed by: RADIOLOGY

## 2024-11-19 PROCEDURE — 77067 SCR MAMMO BI INCL CAD: CPT

## 2024-11-19 PROCEDURE — 77080 DXA BONE DENSITY AXIAL: CPT | Performed by: RADIOLOGY

## 2024-11-19 NOTE — ASSESSMENT & PLAN NOTE
Chol 139 LDL 76 HDL 35 Trigs 140  Cardiovascular risks discussed and, if needed, lifestyle modifications recommended, including nutritional choices, exercise, and elimination of habits contributing to risk.  We agreed on a plan to reduce the current cardiovascular risk.  Aspirin use/tissues was discussed after reviewing updated guidelines  Continue zetia and cardiology care

## 2024-11-20 ENCOUNTER — PHARMACY VISIT (OUTPATIENT)
Dept: PHARMACY | Facility: CLINIC | Age: 69
End: 2024-11-20
Payer: MEDICARE

## 2024-11-20 PROCEDURE — RXMED WILLOW AMBULATORY MEDICATION CHARGE

## 2024-11-21 ENCOUNTER — PHARMACY VISIT (OUTPATIENT)
Dept: PHARMACY | Facility: CLINIC | Age: 69
End: 2024-11-21
Payer: MEDICARE

## 2024-11-22 PROCEDURE — RXMED WILLOW AMBULATORY MEDICATION CHARGE

## 2024-11-26 ENCOUNTER — PHARMACY VISIT (OUTPATIENT)
Dept: PHARMACY | Facility: CLINIC | Age: 69
End: 2024-11-26
Payer: MEDICARE

## 2024-11-29 PROCEDURE — RXMED WILLOW AMBULATORY MEDICATION CHARGE

## 2024-12-02 ENCOUNTER — PHARMACY VISIT (OUTPATIENT)
Dept: PHARMACY | Facility: CLINIC | Age: 69
End: 2024-12-02
Payer: MEDICARE

## 2024-12-02 PROCEDURE — RXMED WILLOW AMBULATORY MEDICATION CHARGE

## 2024-12-03 ENCOUNTER — APPOINTMENT (OUTPATIENT)
Dept: RADIOLOGY | Facility: HOSPITAL | Age: 69
End: 2024-12-03
Payer: MEDICARE

## 2024-12-04 ENCOUNTER — PHARMACY VISIT (OUTPATIENT)
Dept: PHARMACY | Facility: CLINIC | Age: 69
End: 2024-12-04
Payer: MEDICARE

## 2024-12-04 PROCEDURE — RXMED WILLOW AMBULATORY MEDICATION CHARGE

## 2024-12-07 ENCOUNTER — PHARMACY VISIT (OUTPATIENT)
Dept: PHARMACY | Facility: CLINIC | Age: 69
End: 2024-12-07
Payer: MEDICARE

## 2024-12-11 PROCEDURE — RXMED WILLOW AMBULATORY MEDICATION CHARGE

## 2024-12-14 ENCOUNTER — PHARMACY VISIT (OUTPATIENT)
Dept: PHARMACY | Facility: CLINIC | Age: 69
End: 2024-12-14
Payer: MEDICARE

## 2024-12-16 ENCOUNTER — PHARMACY VISIT (OUTPATIENT)
Dept: PHARMACY | Facility: CLINIC | Age: 69
End: 2024-12-16

## 2024-12-17 DIAGNOSIS — I10 BENIGN ESSENTIAL HYPERTENSION: ICD-10-CM

## 2024-12-17 DIAGNOSIS — K21.9 GASTROESOPHAGEAL REFLUX DISEASE, UNSPECIFIED WHETHER ESOPHAGITIS PRESENT: ICD-10-CM

## 2024-12-18 PROCEDURE — RXMED WILLOW AMBULATORY MEDICATION CHARGE

## 2024-12-18 RX ORDER — OMEPRAZOLE 40 MG/1
40 CAPSULE, DELAYED RELEASE ORAL DAILY
Qty: 90 CAPSULE | Refills: 1 | Status: SHIPPED | OUTPATIENT
Start: 2024-12-18

## 2024-12-18 RX ORDER — SPIRONOLACTONE 25 MG/1
25 TABLET ORAL DAILY
Qty: 90 TABLET | Refills: 1 | Status: SHIPPED | OUTPATIENT
Start: 2024-12-18

## 2024-12-19 ENCOUNTER — PHARMACY VISIT (OUTPATIENT)
Dept: PHARMACY | Facility: CLINIC | Age: 69
End: 2024-12-19
Payer: MEDICARE

## 2024-12-19 ENCOUNTER — APPOINTMENT (OUTPATIENT)
Dept: CARDIOLOGY | Facility: CLINIC | Age: 69
End: 2024-12-19
Payer: MEDICARE

## 2024-12-19 VITALS
BODY MASS INDEX: 40.63 KG/M2 | SYSTOLIC BLOOD PRESSURE: 118 MMHG | TEMPERATURE: 98.6 F | WEIGHT: 238 LBS | HEIGHT: 64 IN | HEART RATE: 73 BPM | RESPIRATION RATE: 20 BRPM | OXYGEN SATURATION: 96 % | DIASTOLIC BLOOD PRESSURE: 70 MMHG

## 2024-12-19 DIAGNOSIS — I10 BENIGN ESSENTIAL HYPERTENSION: ICD-10-CM

## 2024-12-19 DIAGNOSIS — I48.0 PAROXYSMAL ATRIAL FIBRILLATION (MULTI): Primary | ICD-10-CM

## 2024-12-19 DIAGNOSIS — R94.31 ELECTROCARDIOGRAM ABNORMAL: ICD-10-CM

## 2024-12-19 DIAGNOSIS — Z95.5 PRESENCE OF CORONARY ANGIOPLASTY IMPLANT AND GRAFT: ICD-10-CM

## 2024-12-19 DIAGNOSIS — R07.89 CHEST DISCOMFORT: ICD-10-CM

## 2024-12-19 DIAGNOSIS — I20.9 ANGINA PECTORIS: ICD-10-CM

## 2024-12-19 DIAGNOSIS — I25.10 CORONARY ARTERIOSCLEROSIS: ICD-10-CM

## 2024-12-19 PROCEDURE — 1159F MED LIST DOCD IN RCRD: CPT | Performed by: INTERNAL MEDICINE

## 2024-12-19 PROCEDURE — 3061F NEG MICROALBUMINURIA REV: CPT | Performed by: INTERNAL MEDICINE

## 2024-12-19 PROCEDURE — 3048F LDL-C <100 MG/DL: CPT | Performed by: INTERNAL MEDICINE

## 2024-12-19 PROCEDURE — 1126F AMNT PAIN NOTED NONE PRSNT: CPT | Performed by: INTERNAL MEDICINE

## 2024-12-19 PROCEDURE — 3078F DIAST BP <80 MM HG: CPT | Performed by: INTERNAL MEDICINE

## 2024-12-19 PROCEDURE — 3044F HG A1C LEVEL LT 7.0%: CPT | Performed by: INTERNAL MEDICINE

## 2024-12-19 PROCEDURE — 99214 OFFICE O/P EST MOD 30 MIN: CPT | Performed by: INTERNAL MEDICINE

## 2024-12-19 PROCEDURE — 1160F RVW MEDS BY RX/DR IN RCRD: CPT | Performed by: INTERNAL MEDICINE

## 2024-12-19 PROCEDURE — 3074F SYST BP LT 130 MM HG: CPT | Performed by: INTERNAL MEDICINE

## 2024-12-19 PROCEDURE — 1123F ACP DISCUSS/DSCN MKR DOCD: CPT | Performed by: INTERNAL MEDICINE

## 2024-12-19 PROCEDURE — 3008F BODY MASS INDEX DOCD: CPT | Performed by: INTERNAL MEDICINE

## 2024-12-19 ASSESSMENT — LIFESTYLE VARIABLES
HAVE YOU OR SOMEONE ELSE BEEN INJURED AS A RESULT OF YOUR DRINKING: NO
AUDIT-C TOTAL SCORE: 0
HOW OFTEN DO YOU HAVE SIX OR MORE DRINKS ON ONE OCCASION: NEVER
SKIP TO QUESTIONS 9-10: 1
AUDIT TOTAL SCORE: 0
HOW MANY STANDARD DRINKS CONTAINING ALCOHOL DO YOU HAVE ON A TYPICAL DAY: PATIENT DOES NOT DRINK
HOW OFTEN DO YOU HAVE A DRINK CONTAINING ALCOHOL: NEVER
HAS A RELATIVE, FRIEND, DOCTOR, OR ANOTHER HEALTH PROFESSIONAL EXPRESSED CONCERN ABOUT YOUR DRINKING OR SUGGESTED YOU CUT DOWN: NO

## 2024-12-19 ASSESSMENT — PAIN SCALES - GENERAL: PAINLEVEL_OUTOF10: 0-NO PAIN

## 2024-12-19 ASSESSMENT — ENCOUNTER SYMPTOMS
DEPRESSION: 0
OCCASIONAL FEELINGS OF UNSTEADINESS: 0
LOSS OF SENSATION IN FEET: 0

## 2024-12-19 NOTE — PROGRESS NOTES
History of present illness:    This is a very pleasant 69-year-old female with history of coronary disease status post PCI to RCA in 2021, history of pulmonary embolism on Eliquis, severe hyperlipidemia, obesity and hypertension. Patient returns to my office for follow-up. Feeling overall good. Denies having any chest pain shortness of breath palpitations dizziness or lightheadedness. Her cholesterol is much more controlled on Repatha and ezetimibe.  Past Medical History:   Diagnosis Date    Acute non-ST elevation myocardial infarction (NSTEMI) (Multi) 05/19/2024    Angina pectoris 10/11/2023    Arthritis     Atrial fibrillation (Multi) 10/11/2023    Benign essential hypertension 09/04/2019    Chest discomfort 10/11/2023    Chronic obstructive pulmonary disease (Multi) 12/30/2018    CKD (chronic kidney disease) 09/09/2022    Coronary arteriosclerosis 10/11/2023    Edema 10/11/2023    Electrocardiogram abnormal 10/11/2023    Endometrial cancer (Multi) 2015    HTN (hypertension) 12/30/2018    Mixed hyperlipidemia 12/30/2018    PE (pulmonary thromboembolism) (Multi) 10/20/2021    Presence of coronary angioplasty implant and graft 04/08/2022    Type 2 diabetes mellitus without complications (Multi) 12/11/2018       Past Surgical History:   Procedure Laterality Date    CARDIAC CATHETERIZATION  04/2022    CHOLECYSTECTOMY  12/2016    CORONARY STENT PLACEMENT  04/22    FL GUIDED ASPIRATION OR INJECTION LARGE JOINT LEFT Left 12/18/2023    FL GUIDED ASPIRATION OR INJECTION LARGE JOINT LEFT 12/18/2023 Ralph Triplett MD [facility]    FL GUIDED ASPIRATION OR INJECTION LARGE JOINT RIGHT Right 12/14/2023    FL GUIDED ASPIRATION OR INJECTION LARGE JOINT RIGHT 12/14/2023 Ralph Triplett MD [facility]    HYSTERECTOMY  08/2015    JOINT REPLACEMENT  10/2010       Allergies   Allergen Reactions    Penicillins Swelling        reports that she quit smoking about 26 years ago. Her smoking use included cigarettes. She has never  been exposed to tobacco smoke. She has never used smokeless tobacco. She reports that she does not drink alcohol and does not use drugs.    Family History   Problem Relation Name Age of Onset    Arthritis Mother Betty Villeda     Diabetes Mother Betty Villeda     Heart disease Mother Betty Villeda     Hypertension Mother Betty Villeda     Miscarriages / Stillbirths Mother Betty Villeda     Arthritis Father Vijay Villeda     Cancer Father Vijay Villeda     Diabetes Father Vijay Villeda     Heart disease Father Vijay Villeda     Stroke Maternal Grandmother Inés Licea     Arthritis Sister Mera Gibson     Cancer Sister Mera Gibson     Diabetes Sister Mera Gibson     Hypertension Sister Mera Gibson     Ovarian cancer Sister Mera Gibson     Cancer Mother's Sister Yahaira De Oliveira     Stroke Mother's Sister Shanda Lundberg        Patient's Medications   New Prescriptions    No medications on file   Previous Medications    ALBUTEROL 2.5 MG /3 ML (0.083 %) NEBULIZER SOLUTION    Use 1 vial (2.5 mg) by nebulization every 6 hours if needed for wheezing.    ALBUTEROL 90 MCG/ACTUATION INHALER    INHALE 2 PUFFS BY MOUTH FOUR TIMES A DAY AS NEEDED FOR WHEEZING    APIXABAN (ELIQUIS) 5 MG TABLET    TAKE 1 TABLET BY MOUTH TWO TIMES A DAY    BLOOD-GLUCOSE METER,CONTINUOUS (FREESTYLE VERNELL 3 READER) [facility] [place]    Use as instructed    BLOOD-GLUCOSE SENSOR (FREESTYLE VERNELL 3 SENSOR) DEVICE    Change sensor every 15 days as directed - Please dispense Vernell 3 plus sensors    DICLOFENAC SODIUM (VOLTAREN) 1 % GEL    Apply 4.5 inches (4 g) topically 2 times a day as needed (pain).    EVOLOCUMAB (REPATHA SURECLICK) 140 MG/ML INJECTION    Inject 1 mL (140 mg) under the skin every 14 (fourteen) days.    EZETIMIBE (ZETIA) 10 MG TABLET    Take 1 tablet (10 mg) by mouth once daily.    FLUTICASONE FUROATE-VILANTEROL (BREO ELLIPTA) 200-25 MCG/DOSE INHALER    INHALE 1 PUFF BY MOUTH ONE TIME DAILY    GABAPENTIN  "(NEURONTIN) 300 MG CAPSULE    Take 2 capsules (600 mg) by mouth 3 times a day.    INSULIN GLARGINE (LANTUS SOLOSTAR U-100 INSULIN) 100 UNIT/ML (3 ML) PEN    Inject 40 Units under the skin once daily at bedtime.    METOPROLOL SUCCINATE XL (TOPROL-XL) 50 MG 24 HR TABLET    Take 1 tablet (50 mg) by mouth once daily.    OMEPRAZOLE (PRILOSEC) 40 MG DR CAPSULE    Take 1 capsule (40 mg) by mouth once daily.    PEN NEEDLE, DIABETIC 32 GAUGE X 5/32\" NEEDLE    Use daily with insulin injection.    RANOLAZINE (RANEXA) 500 MG 12 HR TABLET    TAKE 1 TABLET BY MOUTH TWO TIMES A DAY    SPIRONOLACTONE (ALDACTONE) 25 MG TABLET    Take 1 tablet (25 mg) by mouth once daily.    TIRZEPATIDE (MOUNJARO) 5 MG/0.5 ML PEN INJECTOR    Inject 5 mg under the skin every 7 days.   Modified Medications    No medications on file   Discontinued Medications    No medications on file       Objective   Physical Exam  General: Patient in no acute distress   HEENT: Atraumatic normocephalic.  Neck: Supple, jugular venous pressure within normal limit.  No bruits  Lungs: Clear to auscultation bilaterally  Cardiovascular: Regular rate and rhythm, normal heart sounds, no murmurs rubs or gallops  Abdomen: Soft nontender nondistended.  Normal bowel sounds.  Extremities: Warm to touch, no edema.    Lab Review   Lab on 11/01/2024   Component Date Value    WBC 11/01/2024 17.5 (H)     nRBC 11/01/2024 0.0     RBC 11/01/2024 5.48 (H)     Hemoglobin 11/01/2024 16.7 (H)     Hematocrit 11/01/2024 51.2 (H)     MCV 11/01/2024 93     MCH 11/01/2024 30.5     MCHC 11/01/2024 32.6     RDW 11/01/2024 13.6     Platelets 11/01/2024 295     Neutrophils % 11/01/2024 79.2     Immature Granulocytes %,* 11/01/2024 1.0 (H)     Lymphocytes % 11/01/2024 12.8     Monocytes % 11/01/2024 6.9     Eosinophils % 11/01/2024 0.0     Basophils % 11/01/2024 0.1     Neutrophils Absolute 11/01/2024 13.86 (H)     Immature Granulocytes Ab* 11/01/2024 0.18     Lymphocytes Absolute 11/01/2024 2.25     " Monocytes Absolute 11/01/2024 1.20 (H)     Eosinophils Absolute 11/01/2024 0.00     Basophils Absolute 11/01/2024 0.02     Glucose 11/01/2024 68 (L)     Sodium 11/01/2024 142     Potassium 11/01/2024 4.3     Chloride 11/01/2024 103     Bicarbonate 11/01/2024 29     Anion Gap 11/01/2024 14     Urea Nitrogen 11/01/2024 30 (H)     Creatinine 11/01/2024 0.99     eGFR 11/01/2024 61     Calcium 11/01/2024 9.3     Albumin 11/01/2024 4.1     Alkaline Phosphatase 11/01/2024 57     Total Protein 11/01/2024 6.7     AST 11/01/2024 10     Bilirubin, Total 11/01/2024 0.7     ALT 11/01/2024 13     Hemoglobin A1C 11/01/2024 5.2     Estimated Average Glucose 11/01/2024 103     Cholesterol 11/01/2024 139     HDL-Cholesterol 11/01/2024 35.0     Cholesterol/HDL Ratio 11/01/2024 4.0     LDL Calculated 11/01/2024 76     VLDL 11/01/2024 28     Triglycerides 11/01/2024 140     Non HDL Cholesterol 11/01/2024 104     Color, Urine 11/04/2024 Yellow     Appearance, Urine 11/04/2024 Turbid (N)     Specific Gravity, Urine 11/04/2024 1.026     pH, Urine 11/04/2024 6.0     Protein, Urine 11/04/2024 10 (TRACE)     Glucose, Urine 11/04/2024 Normal     Blood, Urine 11/04/2024 NEGATIVE     Ketones, Urine 11/04/2024 NEGATIVE     Bilirubin, Urine 11/04/2024 NEGATIVE     Urobilinogen, Urine 11/04/2024 3 (1+) (A)     Nitrite, Urine 11/04/2024 NEGATIVE     Leukocyte Esterase, Urine 11/04/2024 250 Adonis/µL (A)     Albumin, Urine Random 11/04/2024 22.7     Creatinine, Urine Random 11/04/2024 90.6     Albumin/Creatinine Ratio 11/04/2024 25.1     WBC, Urine 11/04/2024 >50 (A)     RBC, Urine 11/04/2024 1-2     Squamous Epithelial Cell* 11/04/2024 1-9 (SPARSE)     Bacteria, Urine 11/04/2024 1+ (A)     Mucus, Urine 11/04/2024 FEW         Assessment/Plan   Patient Active Problem List   Diagnosis    Benign essential hypertension    Diabetes mellitus (Multi)    Chronic pain of left knee    Edema    Chest discomfort    Angina pectoris    Non-ST elevation MI (NSTEMI)  (Multi)    Mixed hyperlipidemia    Hyperlipidemia    Syncope    Primary insomnia    Osteoarthritis    Obesity    Morbidly obese (Multi)    Nontoxic single thyroid nodule    Hypoxia    HTN (hypertension)    Elevated white blood cell count, unspecified    Electrocardiogram abnormal    Dyspnea    Type 2 diabetes mellitus without complications (Multi)    Diabetes mellitus type 2 in obese    Coronary arteriosclerosis    CKD (chronic kidney disease)    COPD mixed type (Multi)    Chronic obstructive pulmonary disease (Multi)    Blood glucose abnormal    Atrial fibrillation (Multi)    PE (pulmonary thromboembolism) (Multi)    Abnormal results of liver function studies    Spondylolisthesis at L4-L5 level    Lumbar stenosis with neurogenic claudication    Lumbar pain    Dorsalgia    Difficulty walking    Arthritis of knee, left    Arthritis of left hip    Acute non-ST elevation myocardial infarction (NSTEMI) (Multi)    Endometrial cancer (Multi)    History of total knee replacement    Muscle weakness (generalized)    Other symbolic dysfunctions    Personal history of malignant neoplasm of other parts of uterus    Presence of coronary angioplasty implant and graft    Urinary tract infection        This is a very pleasant 69-year-old female with history of coronary disease status post PCI to RCA in 2021, history of pulmonary embolism on Eliquis, severe hyperlipidemia, obesity and hypertension. Patient returns to my office for follow-up. Feeling overall good. Denies having any chest pain shortness of breath palpitations dizziness or lightheadedness. Her cholesterol is much more controlled on Repatha and ezetimibe.      Patient stable from my standpoint on optimal medical therapy.  Recommend to continue current medications.  Will follow-up in 6 months.  Discussed with her weight loss and lifestyle modification.    Opal Esteban MD

## 2024-12-27 ENCOUNTER — PHARMACY VISIT (OUTPATIENT)
Dept: PHARMACY | Facility: CLINIC | Age: 69
End: 2024-12-27
Payer: MEDICARE

## 2024-12-27 PROCEDURE — RXMED WILLOW AMBULATORY MEDICATION CHARGE

## 2024-12-30 DIAGNOSIS — Z79.4 TYPE 2 DIABETES MELLITUS WITHOUT COMPLICATION, WITH LONG-TERM CURRENT USE OF INSULIN (MULTI): ICD-10-CM

## 2024-12-30 DIAGNOSIS — E11.9 TYPE 2 DIABETES MELLITUS WITHOUT COMPLICATION, WITH LONG-TERM CURRENT USE OF INSULIN (MULTI): ICD-10-CM

## 2024-12-30 PROCEDURE — RXMED WILLOW AMBULATORY MEDICATION CHARGE

## 2024-12-30 RX ORDER — TIRZEPATIDE 5 MG/.5ML
5 INJECTION, SOLUTION SUBCUTANEOUS
Qty: 2 ML | Refills: 0 | Status: SHIPPED | OUTPATIENT
Start: 2024-12-30 | End: 2025-04-21

## 2024-12-31 ENCOUNTER — HOSPITAL ENCOUNTER (OUTPATIENT)
Dept: RADIOLOGY | Facility: HOSPITAL | Age: 69
Discharge: HOME | End: 2024-12-31
Payer: MEDICARE

## 2024-12-31 DIAGNOSIS — M25.552 ACUTE HIP PAIN, BILATERAL: ICD-10-CM

## 2024-12-31 DIAGNOSIS — M25.551 ACUTE HIP PAIN, BILATERAL: ICD-10-CM

## 2024-12-31 PROCEDURE — 77002 NEEDLE LOCALIZATION BY XRAY: CPT | Mod: LT

## 2024-12-31 PROCEDURE — 77002 NEEDLE LOCALIZATION BY XRAY: CPT | Mod: LEFT SIDE | Performed by: RADIOLOGY

## 2024-12-31 PROCEDURE — 2500000004 HC RX 250 GENERAL PHARMACY W/ HCPCS (ALT 636 FOR OP/ED): Performed by: ORTHOPAEDIC SURGERY

## 2024-12-31 PROCEDURE — 20610 DRAIN/INJ JOINT/BURSA W/O US: CPT | Mod: LEFT SIDE | Performed by: RADIOLOGY

## 2024-12-31 PROCEDURE — 2550000001 HC RX 255 CONTRASTS: Performed by: ORTHOPAEDIC SURGERY

## 2024-12-31 RX ORDER — BUPIVACAINE HYDROCHLORIDE 5 MG/ML
5 INJECTION, SOLUTION EPIDURAL; INTRACAUDAL ONCE
Status: CANCELLED | OUTPATIENT
Start: 2024-12-31 | End: 2024-12-31

## 2024-12-31 RX ORDER — TRIAMCINOLONE ACETONIDE 40 MG/ML
40 INJECTION, SUSPENSION INTRA-ARTICULAR; INTRAMUSCULAR ONCE
Status: CANCELLED | OUTPATIENT
Start: 2024-12-31 | End: 2024-12-31

## 2024-12-31 RX ORDER — BUPIVACAINE HYDROCHLORIDE 5 MG/ML
5 INJECTION, SOLUTION EPIDURAL; INTRACAUDAL ONCE
Status: COMPLETED | OUTPATIENT
Start: 2024-12-31 | End: 2024-12-31

## 2024-12-31 RX ORDER — LIDOCAINE HYDROCHLORIDE 10 MG/ML
5 INJECTION, SOLUTION EPIDURAL; INFILTRATION; INTRACAUDAL; PERINEURAL ONCE
Status: CANCELLED | OUTPATIENT
Start: 2024-12-31 | End: 2024-12-31

## 2024-12-31 RX ORDER — TRIAMCINOLONE ACETONIDE 40 MG/ML
40 INJECTION, SUSPENSION INTRA-ARTICULAR; INTRAMUSCULAR ONCE
Status: COMPLETED | OUTPATIENT
Start: 2024-12-31 | End: 2024-12-31

## 2024-12-31 RX ORDER — LIDOCAINE HYDROCHLORIDE 10 MG/ML
5 INJECTION, SOLUTION EPIDURAL; INFILTRATION; INTRACAUDAL; PERINEURAL ONCE
Status: COMPLETED | OUTPATIENT
Start: 2024-12-31 | End: 2024-12-31

## 2024-12-31 RX ADMIN — LIDOCAINE HYDROCHLORIDE 50 MG: 10 INJECTION, SOLUTION EPIDURAL; INFILTRATION; INTRACAUDAL; PERINEURAL at 13:55

## 2024-12-31 RX ADMIN — TRIAMCINOLONE ACETONIDE 40 MG: 40 INJECTION, SUSPENSION INTRA-ARTICULAR; INTRAMUSCULAR at 13:56

## 2024-12-31 RX ADMIN — IOHEXOL 1 ML: 240 INJECTION, SOLUTION INTRATHECAL; INTRAVASCULAR; INTRAVENOUS; ORAL at 13:53

## 2024-12-31 RX ADMIN — BUPIVACAINE HYDROCHLORIDE 5 ML: 5 INJECTION, SOLUTION EPIDURAL; INTRACAUDAL; PERINEURAL at 13:56

## 2024-12-31 ASSESSMENT — PAIN SCALES - GENERAL
PAINLEVEL_OUTOF10: 0 - NO PAIN
PAINLEVEL_OUTOF10: 0 - NO PAIN

## 2025-01-02 ENCOUNTER — PHARMACY VISIT (OUTPATIENT)
Dept: PHARMACY | Facility: CLINIC | Age: 70
End: 2025-01-02
Payer: MEDICARE

## 2025-01-04 PROCEDURE — RXMED WILLOW AMBULATORY MEDICATION CHARGE

## 2025-01-06 DIAGNOSIS — E11.9 TYPE 2 DIABETES MELLITUS WITHOUT COMPLICATION, WITHOUT LONG-TERM CURRENT USE OF INSULIN (MULTI): ICD-10-CM

## 2025-01-06 RX ORDER — INSULIN GLARGINE 100 [IU]/ML
40 INJECTION, SOLUTION SUBCUTANEOUS NIGHTLY
Qty: 15 ML | Refills: 1 | Status: SHIPPED | OUTPATIENT
Start: 2025-01-06 | End: 2026-01-06

## 2025-01-07 PROCEDURE — RXMED WILLOW AMBULATORY MEDICATION CHARGE

## 2025-01-08 ENCOUNTER — PHARMACY VISIT (OUTPATIENT)
Dept: PHARMACY | Facility: CLINIC | Age: 70
End: 2025-01-08
Payer: MEDICARE

## 2025-01-09 ENCOUNTER — PHARMACY VISIT (OUTPATIENT)
Dept: PHARMACY | Facility: CLINIC | Age: 70
End: 2025-01-09
Payer: MEDICARE

## 2025-01-09 ASSESSMENT — ENCOUNTER SYMPTOMS: WEAKNESS: 1

## 2025-01-14 DIAGNOSIS — I21.4 NON-ST ELEVATION MI (NSTEMI) (MULTI): ICD-10-CM

## 2025-01-14 DIAGNOSIS — E78.2 MIXED HYPERLIPIDEMIA: ICD-10-CM

## 2025-01-14 PROCEDURE — RXMED WILLOW AMBULATORY MEDICATION CHARGE

## 2025-01-14 RX ORDER — EVOLOCUMAB 140 MG/ML
140 INJECTION, SOLUTION SUBCUTANEOUS
Qty: 6 ML | Refills: 0 | Status: SHIPPED | OUTPATIENT
Start: 2025-01-14 | End: 2026-01-09

## 2025-01-16 ENCOUNTER — APPOINTMENT (OUTPATIENT)
Dept: PRIMARY CARE | Facility: CLINIC | Age: 70
End: 2025-01-16
Payer: MEDICARE

## 2025-01-16 ENCOUNTER — PHARMACY VISIT (OUTPATIENT)
Dept: PHARMACY | Facility: CLINIC | Age: 70
End: 2025-01-16
Payer: MEDICARE

## 2025-01-16 VITALS — HEIGHT: 64 IN | BODY MASS INDEX: 40.46 KG/M2 | WEIGHT: 237 LBS

## 2025-01-16 DIAGNOSIS — J44.9 COPD MIXED TYPE (MULTI): ICD-10-CM

## 2025-01-16 DIAGNOSIS — E11.9 TYPE 2 DIABETES MELLITUS WITHOUT COMPLICATION, WITH LONG-TERM CURRENT USE OF INSULIN (MULTI): Primary | ICD-10-CM

## 2025-01-16 DIAGNOSIS — Z79.4 TYPE 2 DIABETES MELLITUS WITHOUT COMPLICATION, WITH LONG-TERM CURRENT USE OF INSULIN (MULTI): Primary | ICD-10-CM

## 2025-01-16 RX ORDER — TIRZEPATIDE 7.5 MG/.5ML
7.5 INJECTION, SOLUTION SUBCUTANEOUS
Qty: 6 ML | Refills: 0 | Status: SHIPPED | OUTPATIENT
Start: 2025-01-16 | End: 2025-04-10

## 2025-01-16 RX ORDER — ALBUTEROL SULFATE 90 UG/1
INHALANT RESPIRATORY (INHALATION)
Qty: 20.1 G | Refills: 1 | Status: SHIPPED | OUTPATIENT
Start: 2025-01-16 | End: 2026-01-16

## 2025-01-16 NOTE — PROGRESS NOTES
"DM FOLLOW UP  E11.9    Katya Kaplan \"Kat" is a 69 y.o. female here today at the request of Referring Provider: Trupti Greene MD for my opinion regarding diabetes management.  My final recommendations will be communicated back to the requesting provider by way of shared medical record.     Patient is approved for VAF     Subjective   Past Medical History:  She has a past medical history of Acute non-ST elevation myocardial infarction (NSTEMI) (Multi) (05/19/2024), Angina pectoris (10/11/2023), Arthritis, Atrial fibrillation (Multi) (10/11/2023), Benign essential hypertension (09/04/2019), Chest discomfort (10/11/2023), Chronic obstructive pulmonary disease (Multi) (12/30/2018), CKD (chronic kidney disease) (09/09/2022), Coronary arteriosclerosis (10/11/2023), Edema (10/11/2023), Electrocardiogram abnormal (10/11/2023), Endometrial cancer (Multi) (2015), HTN (hypertension) (12/30/2018), Mixed hyperlipidemia (12/30/2018), PE (pulmonary thromboembolism) (Multi) (10/20/2021), Presence of coronary angioplasty implant and graft (04/08/2022), and Type 2 diabetes mellitus without complications (Multi) (12/11/2018).    Social History:  She reports that she quit smoking about 27 years ago. Her smoking use included cigarettes. She has never been exposed to tobacco smoke. She has never used smokeless tobacco. She reports that she does not drink alcohol and does not use drugs.    Allergies:  Penicillins    CURRENT PHARMACOTHERAPY   Lantus 30 units daily currently     Current Outpatient Medications   Medication Instructions    albuterol 2.5 mg /3 mL (0.083 %) nebulizer solution Use 1 vial (2.5 mg) by nebulization every 6 hours if needed for wheezing.    albuterol 90 mcg/actuation inhaler INHALE 2 PUFFS BY MOUTH FOUR TIMES A DAY AS NEEDED FOR WHEEZING    apixaban (Eliquis) 5 mg tablet TAKE 1 TABLET BY MOUTH TWO TIMES A DAY    blood-glucose meter,continuous (FreeStyle Vernell 3 [place]) [facility] [place] Use as instructed    blood-glucose sensor " "(FreeStyle Vernell 3 Sensor) device Change sensor every 15 days as directed - Please dispense Vernell 3 plus sensors    ezetimibe (ZETIA) 10 mg, oral, Daily    fluticasone furoate-vilanteroL (Breo Ellipta) 200-25 mcg/dose inhaler INHALE 1 PUFF BY MOUTH ONE TIME DAILY    gabapentin (NEURONTIN) 600 mg, oral, 3 times daily    Lantus Solostar U-100 Insulin 40 Units, subcutaneous, Nightly    metoprolol succinate XL (TOPROL-XL) 50 mg, oral, Daily    Mounjaro 5 mg, subcutaneous, Every 7 days    omeprazole (PRILOSEC) 40 mg, oral, Daily    pen needle, diabetic 32 gauge x 5/32\" needle Use daily with insulin injection.    ranolazine (Ranexa) 500 mg 12 hr tablet TAKE 1 TABLET BY MOUTH TWO TIMES A DAY    Repatha SureClick 140 mg, subcutaneous, Every 14 days    spironolactone (ALDACTONE) 25 mg, oral, Daily     Pt denies SE/intolerances  Reviewed all medications by prescribing practitioner (such as prescriptions, OTCs, herbal therapies, supplements) and documented in the medical record.     Reviewed need for secondary prevention: Statins, ACE-I/ARB, Aspirin    Glucose Monitoring  Patient is using CGM, Vernell.  Report reviewed with patient.  See attached documents.    Name: Katya Kaplan  YOB: 1955  Report Period: 01/03/2025 - 01/16/2025 (14 days)  Generated: 01/16/2025  Time CGM Active: 97%      Glucose Statistics and Targets  Average Glucose: 103 mg/dL  Glucose Management Indicator (GMI): 5.8%  Glucose Variability (%CV): 20.7%  Target Range: 70 - 180 mg/dL      Time in Ranges  Very High: >250 mg/dL --- 0%  High: 181 - 250 mg/dL --- 0%  Target Range: 70 - 180 mg/dL --- 97%  Low: 54 - 69 mg/dL --- 3%  Very Low: <54 mg/dL --- 0%      Lifestyle:  Current diet: improving, trying to eat smaller portions and improving, trying to limit CHO foods  Current exercise: no regular exercise due to back pain     Last Labs/Vitals/Meds  Hemoglobin A1C (%)   Date Value   11/01/2024 5.2   08/13/2024 6.7 (H)   09/01/2023 8.6 (H) "   04/01/2022 6.2 (A)   11/10/2021 7.8 (H)   10/11/2021 8.7 (H)   01/27/2020 7.2 (H)   09/13/2019 6.8 (H)     Glucose (mg/dL)   Date Value   11/01/2024 68 (L)     Creatinine (mg/dL)   Date Value   11/01/2024 0.99     eGFR (mL/min/1.73m*2)   Date Value   11/01/2024 61       BP Readings from Last 3 Encounters:   12/19/24 118/70   11/06/24 140/84   08/21/24 112/55        Wt Readings from Last 6 Encounters:   12/19/24 108 kg (238 lb)   11/19/24 112 kg (246 lb)   11/06/24 112 kg (246 lb)   09/16/24 115 kg (253 lb 12.8 oz)   08/21/24 116 kg (255 lb)   08/14/24 116 kg (255 lb)     BMI Readings from Last 6 Encounters:   12/19/24 41.50 kg/m²   11/19/24 42.89 kg/m²   11/06/24 42.89 kg/m²   09/16/24 44.25 kg/m²   08/21/24 44.46 kg/m²   08/14/24 45.17 kg/m²       Assessment:  Patients diabetes is improved with most recent A1c of 5.2% (Goal < 7%).  Was 6.7% 5 months ago.   Compliance at present is estimated to be good  Discussed increase GLP-1 dose for additional glycemic control and wt loss.    Discussed reduction in basal insulin dose to prevent hypoglycemia      Plan:   START Mounjaro 7.5mg once weekly   Decrease Lantus to 26 units daily.  May decrease by 2 units every 3 days until morning blood sugars are over 100.  Follow up with Mamie in 2 months       Data reviewed and evaluated by DONOVAN Raza RPH, [facility] [facility]  Treatment and plan changes discussed with Trupti Greene MD

## 2025-01-16 NOTE — PATIENT INSTRUCTIONS
START Mounjaro 7.5mg once weekly   Decrease Lantus to 26 units daily.  May decrease by 2 units every 3 days until morning blood sugars are over 100.  Follow up with Mamie in 2 months

## 2025-01-17 ENCOUNTER — PHARMACY VISIT (OUTPATIENT)
Dept: PHARMACY | Facility: CLINIC | Age: 70
End: 2025-01-17
Payer: MEDICARE

## 2025-01-21 ENCOUNTER — APPOINTMENT (OUTPATIENT)
Dept: PAIN MEDICINE | Facility: CLINIC | Age: 70
End: 2025-01-21
Payer: MEDICARE

## 2025-01-21 VITALS
HEART RATE: 76 BPM | DIASTOLIC BLOOD PRESSURE: 77 MMHG | SYSTOLIC BLOOD PRESSURE: 101 MMHG | WEIGHT: 237 LBS | BODY MASS INDEX: 41.32 KG/M2 | RESPIRATION RATE: 16 BRPM

## 2025-01-21 DIAGNOSIS — M48.062 LUMBAR STENOSIS WITH NEUROGENIC CLAUDICATION: Primary | ICD-10-CM

## 2025-01-21 PROCEDURE — 1125F AMNT PAIN NOTED PAIN PRSNT: CPT | Performed by: PHYSICAL MEDICINE & REHABILITATION

## 2025-01-21 PROCEDURE — 3074F SYST BP LT 130 MM HG: CPT | Performed by: PHYSICAL MEDICINE & REHABILITATION

## 2025-01-21 PROCEDURE — 1123F ACP DISCUSS/DSCN MKR DOCD: CPT | Performed by: PHYSICAL MEDICINE & REHABILITATION

## 2025-01-21 PROCEDURE — 99214 OFFICE O/P EST MOD 30 MIN: CPT | Performed by: PHYSICAL MEDICINE & REHABILITATION

## 2025-01-21 PROCEDURE — 3078F DIAST BP <80 MM HG: CPT | Performed by: PHYSICAL MEDICINE & REHABILITATION

## 2025-01-21 RX ORDER — DIAZEPAM 5 MG/1
5 TABLET ORAL ONCE AS NEEDED
OUTPATIENT
Start: 2025-01-21

## 2025-01-21 ASSESSMENT — PAIN SCALES - GENERAL: PAINLEVEL_OUTOF10: 8

## 2025-01-21 NOTE — PROGRESS NOTES
"Subjective   Patient ID: Katya Kaplan \"Mariam\" is a 69 y.o. female who presents for Follow-up and Back Pain.  HPI  Katya Kaplan \"Mariam\" is a 69 y.o. female with past medical history of previous MI, HTN, COPD, CKD, HLD, PE, T2DM, chronic low back pain with claudication who presents to the clinic for follow up of low back pain s/p epidural steroid injection. Patient had a lumbar interlaminar epidural steroid injection in August of 2024 with Dr. Victor for lumbar claudication.  It is unclear as to the amount of relief the patient had from this injection.  Patient states her pain is mainly in her lower back in the middle with some spread to the sides. Her right side is worse than her left. She rates the pain a 3/10 in severity when she is laying down at night or sitting in her wheelchair, however her pain is significantly worse when attempting to stand up or walk even a few steps. She cannot tolerate walking more than 50 feet without significant pain in her back 10/10 and needs to take a break because of the weakness in her legs. She uses a standing rollator for assistance at home which helps but she cannot tolerate using it for too long before her back hurts her. She does not endorse radiation of the pain into her lower extremities. She denies any numbness or tingling in the lower extremities.              Review of Systems     Current Outpatient Medications   Medication Instructions    albuterol 2.5 mg /3 mL (0.083 %) nebulizer solution Use 1 vial (2.5 mg) by nebulization every 6 hours if needed for wheezing.    albuterol 90 mcg/actuation inhaler INHALE 2 PUFFS BY MOUTH FOUR TIMES A DAY AS NEEDED FOR WHEEZING    apixaban (Eliquis) 5 mg tablet TAKE 1 TABLET BY MOUTH TWO TIMES A DAY    blood-glucose meter,continuous (FreeStyle Vernell 3 [facility]) [facility] Use as instructed    blood-glucose sensor (FreeStyle Vernell 3 Sensor) device Change sensor every 15 days as directed - Please dispense Vernell 3 plus sensors    ezetimibe " "(ZETIA) 10 mg, oral, Daily    fluticasone furoate-vilanteroL (Breo Ellipta) 200-25 mcg/dose inhaler INHALE 1 PUFF BY MOUTH ONE TIME DAILY    gabapentin (NEURONTIN) 600 mg, oral, 3 times daily    Lantus Solostar U-100 Insulin 40 Units, subcutaneous, Nightly    metoprolol succinate XL (TOPROL-XL) 50 mg, oral, Daily    Mounjaro 7.5 mg, subcutaneous, Every 7 days    omeprazole (PRILOSEC) 40 mg, oral, Daily    pen needle, diabetic 32 gauge x 5/32\" needle Use daily with insulin injection.    ranolazine (Ranexa) 500 mg 12 hr tablet TAKE 1 TABLET BY MOUTH TWO TIMES A DAY    Repatha SureClick 140 mg, subcutaneous, Every 14 days    spironolactone (ALDACTONE) 25 mg, oral, Daily        Past Medical History:   Diagnosis Date    Acute non-ST elevation myocardial infarction (NSTEMI) (Multi) 05/19/2024    Angina pectoris 10/11/2023    Arthritis     Atrial fibrillation (Multi) 10/11/2023    Benign essential hypertension 09/04/2019    Chest discomfort 10/11/2023    Chronic obstructive pulmonary disease (Multi) 12/30/2018    CKD (chronic kidney disease) 09/09/2022    Coronary arteriosclerosis 10/11/2023    Edema 10/11/2023    Electrocardiogram abnormal 10/11/2023    Endometrial cancer (Multi) 2015    HTN (hypertension) 12/30/2018    Mixed hyperlipidemia 12/30/2018    PE (pulmonary thromboembolism) (Multi) 10/20/2021    Presence of coronary angioplasty implant and graft 04/08/2022    Type 2 diabetes mellitus without complications (Multi) 12/11/2018        Past Surgical History:   Procedure Laterality Date    CARDIAC CATHETERIZATION  04/2022    CHOLECYSTECTOMY  12/2016    CORONARY STENT PLACEMENT  04/22    FL GUIDED ASPIRATION OR INJECTION LARGE JOINT LEFT Left 12/18/2023    FL GUIDED ASPIRATION OR INJECTION LARGE JOINT LEFT 12/18/2023 Ralph Triplett MD [facility]    FL GUIDED ASPIRATION OR INJECTION LARGE JOINT RIGHT Right 12/14/2023    FL GUIDED ASPIRATION OR INJECTION LARGE JOINT RIGHT 12/14/2023 Ralph Triplett MD [facility]    HYSTERECTOMY  08/2015    JOINT REPLACEMENT  10/2010        Family History   Problem Relation Name Age of Onset    Arthritis Mother Betty Villeda     Diabetes Mother Betty Villeda     Heart disease Mother Betty Villeda     Hypertension Mother Betty Villeda     Miscarriages / Stillbirths Mother Betty Villeda     Arthritis Father Vijay Villeda     Cancer Father Vijay Villeda     Diabetes Father Vijay Villeda     Heart disease Father Vijay Villeda     Stroke Maternal Grandmother Inés Licea     Arthritis Sister Mera Gibson     Cancer Sister Mera Gibson     Diabetes Sister Mera Gibson     Hypertension Sister Mera Gibson     Ovarian cancer Sister Mera Gibson     Cancer Mother's Sister Yahaira De Oliveira     Stroke Mother's Sister Shanda Lundberg         Allergies   Allergen Reactions    Penicillins Swelling        MR lumbar spine wo IV contrast 07/23/2024    Narrative  Interpreted By:  Marco Jimenez,  STUDY:  MR LUMBAR SPINE WO IV CONTRAST; ;  7/23/2024 1:25 pm    INDICATION:  Signs/Symptoms:Back pain with claudication symptoms.    COMPARISON:  CT abdomen and pelvis from 6/11/2023.    ACCESSION NUMBER(S):  [id number]    ORDERING CLINICIAN:  MAI MARTINES    TECHNIQUE:  MRI of the lumbar spine was performed with the acquisition of  sagittal T2, sagittal T1, sagittal STIR, axial T1, and axial T2  weighted sequences.    FINDINGS:  This report assumes 5 non-rib bearing lumbar vertebral bodies. The  lowest intervertebral disc will be labeled L5-S1.    There is grade 1 anterolisthesis at L4-L5, unchanged. Vertebral body  heights are maintained. There is mild intervertebral disc height  narrowing at L3-L4. There are chronic degenerative endplate changes  at multiple levels including degenerative Schmorl's node and  osteophyte formation. There is slight STIR hyperintense signal  located within the L3-L4 endplates which may reflect degenerative  osseous edema versus reactive  hypervascularity. Marrow signal is  heterogeneous in appearance. The conus medullaris terminates at the  level of L2 and is unremarkable in appearance.    At T12-L1, there is no posterior disc contour abnormality. There is  no spinal canal stenosis or neural foraminal narrowing. There is no  facet osteoarthropathy.    At L1-L2, there is a small diffuse disc bulge. There is no spinal  canal stenosis. There is no neural foraminal narrowing. There is no  facet osteoarthropathy.    At L2-L3, there is a small diffuse disc bulge. There is no spinal  canal stenosis or neural foraminal narrowing. There is mild right  facet osteoarthropathy.    At L3-L4, there is moderate-to-marked spinal canal stenosis due to  diffuse disc bulge, prominent posterior epidural fat, ligamentum  flavum thickening, and marked facet osteoarthropathy. There is  extension of disc into the bilateral neural foramina causing mild  bilateral neural foraminal narrowing. There is degenerative fluid  causing mild expansion of the facet joints.    At L4-L5, there is minimal spinal canal stenosis due to grade 1  anterolisthesis, ligamentum flavum thickening, and marked facet  osteoarthropathy. There is extension of disc into the neural foramina  but there is no striking neural foraminal narrowing.    At L5-S1, there is no posterior disc contour abnormality. There is no  spinal canal stenosis or neural foraminal narrowing. There is marked  bilateral facet osteoarthropathy.    Partially visualized large cyst in the left kidney.    Impression  1. Moderate-to-marked spinal canal stenosis at L3-L4 due to  combination of degenerative changes and prominent posterior epidural  fat.    2. Additional multilevel degenerative changes of the lumbar spine as  detailed above.    This study was interpreted at [facility].    MACRO:  None    Signed by: Marco Jimenez 7/23/2024 2:51 PM  Dictation workstation:   [id number]      Objective      Vitals:    01/21/25 1504   BP: 101/77   Pulse: 76   Resp: 16      Pain Score:   8        Physical Exam    GENERAL EXAM  Vital Signs: Vital signs to include heart rate, respiration rate, blood pressure, and temperature were reviewed.  General Appearance:  Awake, alert, healthy appearing, well developed, No acute distress.  Head: Normocephalic without evidence of head injury.  Neck: The appearance of the neck was normal without swelling with a midline trachea.  Eyes: The eyelids and eyebrows exhibited no abnormalities.  Pupils were not pin-point.  Sclera was without icterus.  Lungs: Respiration rhythm and depth was normal.  Respiratory movements were normal without labored breathing.  Cardiovascular: No peripheral edema was present.    Neurological: Patient was oriented to time, place, and person.  Speech was normal.  Balance, gait, and stance were unremarkable.    Psychiatric: Appearance was normal with appropriate dress.  Mood was euthymic and affect was normal.  Skin: Affected regions were without ecchymosis or skin lesions.    Physical exam as above except:  Point tenderness to palpation over lumbar midline spine and lumbar paraspinous muscles, right worse than left.         Assessment/Plan   Diagnoses and all orders for this visit:  Lumbar stenosis with neurogenic claudication  -     FL pain management; Future  -     Transforaminal; Future  Other orders  -     NPO Diet Except: Sips with meds; Effective now; Standing  -     Height and weight; Standing  -     Insert and maintain peripheral IV; Standing  -     Saline lock IV; Standing  -     POCT Glucose; Standing  -     Type And Screen; Standing  -     diazePAM (Valium) tablet 5 mg  -     Adult diet Regular; Standing  -     Vital Signs; Standing  -     Notify physician - Standard Parameters; Standing  -     Continue IV fluids ordered pre-procedure; Standing  -     Prior to Discharge O2 Weaning; Standing  -     Pulse oximetry, continuous; Standing  -      "Discharge patient; Standing      Assessment:   Katya Kaplan \"Kat" is a 69 y.o. female with past medical history of previous MI, HTN, COPD, CKD, HLD, PE, T2DM, chronic low back pain with claudication who presents to the clinic for follow up of low back pain s/p epidural steroid injection.  I did personally review her MRI of her lumbar spine showing moderate central canal stenosis at L3-4 with degenerative changes at that level.  Given unclear efficacy with previous intralaminar epidural steroid injection injection, we discussed doing a more targeted transforaminal approach at L3 level bilaterally. Patient agreeable to trying this injection at this time and understands if she does not respond well to this injection then a surgery referral might be the best option.     Plan:  -Bilateral transforaminal epidural steroid injections L3.  We discussed the risks, benefits and alternatives to the procedure and the patient would like to proceed.  She will need to hold her eliquis for 3-5 days prior to the procedure  -Continue physical therapy exercises at home   -Continue with gabapentin       This note was generated with the aid of dictation software, there may be typos despite my attempts at proofreading.     I saw and evaluated the patient. I personally obtained the key and critical portions of the history and physical exam or was physically present for key and critical portions performed by the resident/fellow. I reviewed the resident/fellow's documentation and discussed the patient with the resident/fellow. I agree with the resident/fellow's medical decision making as documented in the note.    Gilmer Victor MD   "

## 2025-01-27 ENCOUNTER — TELEPHONE (OUTPATIENT)
Facility: CLINIC | Age: 70
End: 2025-01-27
Payer: MEDICARE

## 2025-01-27 ENCOUNTER — PHARMACY VISIT (OUTPATIENT)
Dept: PHARMACY | Facility: CLINIC | Age: 70
End: 2025-01-27
Payer: MEDICARE

## 2025-01-27 PROCEDURE — RXMED WILLOW AMBULATORY MEDICATION CHARGE

## 2025-01-27 NOTE — TELEPHONE ENCOUNTER
Left a vm to let pt know she needs to contact  speciality pharmacy to get her gel injections,          [facility] Phone: [phone]

## 2025-01-28 ENCOUNTER — PHARMACY VISIT (OUTPATIENT)
Dept: PHARMACY | Facility: CLINIC | Age: 70
End: 2025-01-28
Payer: MEDICARE

## 2025-01-29 ENCOUNTER — PHARMACY VISIT (OUTPATIENT)
Dept: PHARMACY | Facility: CLINIC | Age: 70
End: 2025-01-29
Payer: MEDICARE

## 2025-01-29 PROCEDURE — RXMED WILLOW AMBULATORY MEDICATION CHARGE

## 2025-01-30 ENCOUNTER — APPOINTMENT (OUTPATIENT)
Dept: ORTHOPEDIC SURGERY | Facility: CLINIC | Age: 70
End: 2025-01-30
Payer: MEDICARE

## 2025-01-30 DIAGNOSIS — M17.12 ARTHRITIS OF LEFT KNEE: Primary | ICD-10-CM

## 2025-01-30 PROCEDURE — 1125F AMNT PAIN NOTED PAIN PRSNT: CPT | Performed by: ORTHOPAEDIC SURGERY

## 2025-01-30 PROCEDURE — 1159F MED LIST DOCD IN RCRD: CPT | Performed by: ORTHOPAEDIC SURGERY

## 2025-01-30 PROCEDURE — 1123F ACP DISCUSS/DSCN MKR DOCD: CPT | Performed by: ORTHOPAEDIC SURGERY

## 2025-01-30 PROCEDURE — 1160F RVW MEDS BY RX/DR IN RCRD: CPT | Performed by: ORTHOPAEDIC SURGERY

## 2025-01-30 PROCEDURE — 1036F TOBACCO NON-USER: CPT | Performed by: ORTHOPAEDIC SURGERY

## 2025-01-30 PROCEDURE — 20610 DRAIN/INJ JOINT/BURSA W/O US: CPT | Performed by: ORTHOPAEDIC SURGERY

## 2025-01-30 PROCEDURE — 99024 POSTOP FOLLOW-UP VISIT: CPT | Performed by: ORTHOPAEDIC SURGERY

## 2025-01-30 ASSESSMENT — PAIN SCALES - GENERAL: PAINLEVEL_OUTOF10: 3

## 2025-01-30 ASSESSMENT — PAIN - FUNCTIONAL ASSESSMENT: PAIN_FUNCTIONAL_ASSESSMENT: 0-10

## 2025-01-30 NOTE — PROGRESS NOTES
Katya Kaplan is a 69 y.o. female here for gel injection  Chief Complaint   Patient presents with    Left Knee - Arthritis     LT KNEE DUROLANE - [facility]         L Inj/Asp: L knee on 1/30/2025 11:18 AM  Indications: pain and joint swelling  Details: 22 G needle, anterolateral approach  Medications: 60 mg sodium hyaluronate 60 mg/3 mL    Discussion:  I discussed the conservative treatment options for knee osteoarthritis including but not limited to physical therapy, oral NSAIDS, activity and lifestyle modification, hyaluronic acid injections and corticosteroid injections. Pt has elected to undergo a hyaluronic acid injection today. I have explained the risk and benefits of an injection including the possibility of joint infection, bleeding, damage to cartilage, allergic reaction. Patient verbalized understanding and gave verbal consent wishes to proceed with a intra-articular hyaluronic acid injection for their knee.    Procedure:  After discussing the risk and benefits of the procedure, we proceeded with an intra-articular left knee injection. We discussed the risks and benefits and potential morbidity related to the treatment, and to the prescription medication administered in the injection    With the patient's informed verbal consent, the left knee were prepped in standard sterile fashion with Chlorhexidine. The skin was then anesthetized with ethyl chloride spray and cleaned again with Chlorhexidine. The left knee was then apirated/injected with a prefilled 20-gauge syringe of 3ml/60mg Durolane using the lateral approach without complications.  The patient tolerated this well.  A bandaid was applied and the patient ambulated out of the clinic on ther own accord without difficulty. Patient was instructed to avoid physical activity for 24-48 hours to prevent the knees from swelling and may ice the knee as tolerated. Patient should contact the office if any signs of of infection appear: redness, fever,  chills, drainage, swelling or warmth to the knee.        Procedure, treatment alternatives, risks and benefits explained, specific risks discussed. Consent was given by the patient. Immediately prior to procedure a time out was called to verify the correct patient, procedure, equipment, support staff and site/side marked as required. Patient was prepped and draped in the usual sterile fashion.

## 2025-01-31 ENCOUNTER — PHARMACY VISIT (OUTPATIENT)
Dept: PHARMACY | Facility: CLINIC | Age: 70
End: 2025-01-31
Payer: MEDICARE

## 2025-01-31 DIAGNOSIS — I21.4 NON-ST ELEVATION MI (NSTEMI) (MULTI): ICD-10-CM

## 2025-01-31 PROCEDURE — RXMED WILLOW AMBULATORY MEDICATION CHARGE

## 2025-02-02 DIAGNOSIS — I21.4 NON-ST ELEVATION MI (NSTEMI) (MULTI): ICD-10-CM

## 2025-02-03 ENCOUNTER — OFFICE VISIT (OUTPATIENT)
Dept: HEMATOLOGY/ONCOLOGY | Facility: CLINIC | Age: 70
End: 2025-02-03
Payer: MEDICARE

## 2025-02-03 ENCOUNTER — PHARMACY VISIT (OUTPATIENT)
Dept: PHARMACY | Facility: CLINIC | Age: 70
End: 2025-02-03
Payer: MEDICARE

## 2025-02-03 VITALS
HEIGHT: 63 IN | DIASTOLIC BLOOD PRESSURE: 71 MMHG | HEART RATE: 73 BPM | WEIGHT: 234.13 LBS | TEMPERATURE: 95.9 F | SYSTOLIC BLOOD PRESSURE: 105 MMHG | RESPIRATION RATE: 17 BRPM | BODY MASS INDEX: 41.48 KG/M2 | OXYGEN SATURATION: 96 %

## 2025-02-03 DIAGNOSIS — D75.1 POLYCYTHEMIA: ICD-10-CM

## 2025-02-03 DIAGNOSIS — R71.8 ELEVATED RED BLOOD CELL COUNT: ICD-10-CM

## 2025-02-03 DIAGNOSIS — Z80.3 FAMILY HISTORY OF BREAST CANCER: ICD-10-CM

## 2025-02-03 DIAGNOSIS — Z80.49 FAMILY HISTORY OF MALIGNANT NEOPLASM OF ENDOMETRIUM: ICD-10-CM

## 2025-02-03 DIAGNOSIS — N28.1 KIDNEY CYSTS: Primary | ICD-10-CM

## 2025-02-03 DIAGNOSIS — C54.1 ENDOMETRIAL CANCER (MULTI): ICD-10-CM

## 2025-02-03 LAB
ALBUMIN SERPL BCP-MCNC: 3.6 G/DL (ref 3.4–5)
ALP SERPL-CCNC: 53 U/L (ref 33–136)
ALT SERPL W P-5'-P-CCNC: 10 U/L (ref 7–45)
ANION GAP SERPL CALC-SCNC: 13 MMOL/L (ref 10–20)
AST SERPL W P-5'-P-CCNC: 9 U/L (ref 9–39)
BASOPHILS # BLD AUTO: 0.01 X10*3/UL (ref 0–0.1)
BASOPHILS NFR BLD AUTO: 0.1 %
BILIRUB SERPL-MCNC: 0.5 MG/DL (ref 0–1.2)
BUN SERPL-MCNC: 33 MG/DL (ref 6–23)
CALCIUM SERPL-MCNC: 9 MG/DL (ref 8.6–10.3)
CHLORIDE SERPL-SCNC: 106 MMOL/L (ref 98–107)
CO2 SERPL-SCNC: 25 MMOL/L (ref 21–32)
CREAT SERPL-MCNC: 0.82 MG/DL (ref 0.5–1.05)
CRP SERPL-MCNC: 0.39 MG/DL
EGFRCR SERPLBLD CKD-EPI 2021: 77 ML/MIN/1.73M*2
EOSINOPHIL # BLD AUTO: 0.15 X10*3/UL (ref 0–0.7)
EOSINOPHIL NFR BLD AUTO: 1.3 %
ERYTHROCYTE [DISTWIDTH] IN BLOOD BY AUTOMATED COUNT: 13 % (ref 11.5–14.5)
ERYTHROCYTE [SEDIMENTATION RATE] IN BLOOD BY WESTERGREN METHOD: 12 MM/H (ref 0–30)
FERRITIN SERPL-MCNC: 308 NG/ML (ref 8–150)
FOLATE SERPL-MCNC: 7.4 NG/ML
GLUCOSE SERPL-MCNC: 129 MG/DL (ref 74–99)
HAPTOGLOB SERPL NEPH-MCNC: 191 MG/DL (ref 30–200)
HCT VFR BLD AUTO: 48 % (ref 36–46)
HGB BLD-MCNC: 15.7 G/DL (ref 12–16)
HGB RETIC QN: 34 PG (ref 28–38)
IMM GRANULOCYTES # BLD AUTO: 0.06 X10*3/UL (ref 0–0.7)
IMM GRANULOCYTES NFR BLD AUTO: 0.5 % (ref 0–0.9)
IMMATURE RETIC FRACTION: 6.5 %
IRON SATN MFR SERPL: 23 % (ref 25–45)
IRON SERPL-MCNC: 61 UG/DL (ref 35–150)
LDH SERPL L TO P-CCNC: 130 U/L (ref 84–246)
LYMPHOCYTES # BLD AUTO: 1.83 X10*3/UL (ref 1.2–4.8)
LYMPHOCYTES NFR BLD AUTO: 15.5 %
MCH RBC QN AUTO: 30.6 PG (ref 26–34)
MCHC RBC AUTO-ENTMCNC: 32.7 G/DL (ref 32–36)
MCV RBC AUTO: 94 FL (ref 80–100)
MONOCYTES # BLD AUTO: 0.61 X10*3/UL (ref 0.1–1)
MONOCYTES NFR BLD AUTO: 5.2 %
NEUTROPHILS # BLD AUTO: 9.16 X10*3/UL (ref 1.2–7.7)
NEUTROPHILS NFR BLD AUTO: 77.4 %
NRBC BLD-RTO: ABNORMAL /100{WBCS}
PATH REVIEW-CBC DIFFERENTIAL: NORMAL
PLATELET # BLD AUTO: 239 X10*3/UL (ref 150–450)
POTASSIUM SERPL-SCNC: 4.1 MMOL/L (ref 3.5–5.3)
PROT SERPL-MCNC: 6.2 G/DL (ref 6.4–8.2)
PROT SERPL-MCNC: 6.7 G/DL (ref 6.4–8.2)
RBC # BLD AUTO: 5.13 X10*6/UL (ref 4–5.2)
RETICS #: 0.09 X10*6/UL (ref 0.02–0.11)
RETICS/RBC NFR AUTO: 1.8 % (ref 0.5–2)
SODIUM SERPL-SCNC: 140 MMOL/L (ref 136–145)
TIBC SERPL-MCNC: 262 UG/DL (ref 240–445)
UIBC SERPL-MCNC: 201 UG/DL (ref 110–370)
VIT B12 SERPL-MCNC: 313 PG/ML (ref 211–911)
WBC # BLD AUTO: 11.8 X10*3/UL (ref 4.4–11.3)

## 2025-02-03 PROCEDURE — 82728 ASSAY OF FERRITIN: CPT

## 2025-02-03 PROCEDURE — RXMED WILLOW AMBULATORY MEDICATION CHARGE

## 2025-02-03 PROCEDURE — 85045 AUTOMATED RETICULOCYTE COUNT: CPT

## 2025-02-03 PROCEDURE — 82607 VITAMIN B-12: CPT | Mod: GEALAB

## 2025-02-03 PROCEDURE — 86140 C-REACTIVE PROTEIN: CPT

## 2025-02-03 PROCEDURE — 83540 ASSAY OF IRON: CPT

## 2025-02-03 PROCEDURE — 83615 LACTATE (LD) (LDH) ENZYME: CPT

## 2025-02-03 PROCEDURE — 85652 RBC SED RATE AUTOMATED: CPT

## 2025-02-03 PROCEDURE — 82746 ASSAY OF FOLIC ACID SERUM: CPT | Mod: GEALAB

## 2025-02-03 PROCEDURE — 36415 COLL VENOUS BLD VENIPUNCTURE: CPT

## 2025-02-03 PROCEDURE — 85025 COMPLETE CBC W/AUTO DIFF WBC: CPT

## 2025-02-03 PROCEDURE — 81450 HL NEO GSAP 5-50DNA/DNA&RNA: CPT

## 2025-02-03 PROCEDURE — 84155 ASSAY OF PROTEIN SERUM: CPT | Mod: 59,GEALAB

## 2025-02-03 PROCEDURE — 83010 ASSAY OF HAPTOGLOBIN QUANT: CPT | Mod: GEALAB

## 2025-02-03 PROCEDURE — 86334 IMMUNOFIX E-PHORESIS SERUM: CPT | Mod: GEALAB

## 2025-02-03 PROCEDURE — 80053 COMPREHEN METABOLIC PANEL: CPT

## 2025-02-03 PROCEDURE — 82668 ASSAY OF ERYTHROPOIETIN: CPT

## 2025-02-03 SDOH — ECONOMIC STABILITY: FOOD INSECURITY: WITHIN THE PAST 12 MONTHS, YOU WORRIED THAT YOUR FOOD WOULD RUN OUT BEFORE YOU GOT THE MONEY TO BUY MORE.: NEVER TRUE

## 2025-02-03 SDOH — ECONOMIC STABILITY: FOOD INSECURITY: WITHIN THE PAST 12 MONTHS, THE FOOD YOU BOUGHT JUST DIDN'T LAST AND YOU DIDN'T HAVE MONEY TO GET MORE.: NEVER TRUE

## 2025-02-03 ASSESSMENT — COLUMBIA-SUICIDE SEVERITY RATING SCALE - C-SSRS
1. IN THE PAST MONTH, HAVE YOU WISHED YOU WERE DEAD OR WISHED YOU COULD GO TO SLEEP AND NOT WAKE UP?: NO
6. HAVE YOU EVER DONE ANYTHING, STARTED TO DO ANYTHING, OR PREPARED TO DO ANYTHING TO END YOUR LIFE?: NO
2. HAVE YOU ACTUALLY HAD ANY THOUGHTS OF KILLING YOURSELF?: NO

## 2025-02-03 ASSESSMENT — ENCOUNTER SYMPTOMS
OCCASIONAL FEELINGS OF UNSTEADINESS: 1
LOSS OF SENSATION IN FEET: 0
DEPRESSION: 0

## 2025-02-03 ASSESSMENT — PAIN SCALES - GENERAL: PAINLEVEL_OUTOF10: 8

## 2025-02-03 NOTE — PROGRESS NOTES
"[facility]    PATIENT VISIT INFORMATION    Visit Type: New Visit    Referring Provider: Loraine Lux, APRN-CNP  Reason for referral: Elevated red blood cells  Primary Practice Provider: Trupti Greene MD    CANCER/HEMATOLGOY HISTORY    69-year-old  female presents for evaluation of elevated red blood cells.  Referred by primary care group. Per medical review of records polycythemia dates back to June 2023 with hemoglobins as high as 16.7 and hematocrit as high as 51.2.  Leukocytosis dating back to April 2022 with WBCs as high as 18 with immature granulocytes noted most recently since June 2024.  Neutrophilia as high as 13.86 dating back to April 2022.     Extensive medical history including A-fib, coronary arterial sclerosis, acute non-ST elevation myocardial infarction, implant and graft with coronary angioplasty, angina pectoris, mixed hyperlipidemia, hypertension, pulmonary embolism on Eliquis, diabetes mellitus, morbid obesity, thyroid nodule, hyperglycemia, chronic kidney disease and urinary tract infection, abnormal liver enzyme results, endometrial cancer, spondylolithiasis of L4 and L5, arthritis, osteoarthritis, total knee replacement, lumbar stenosis with neurogenic claudication, COPD, primary insomnia, and syncope.    Hysterectomy 2015 endometrial cancer.   2017 Colonoscopy [facility] reports normal. Removed one polyp. Due now.   Receives steroid injections in her back and her knees every 3 months throughout.  Follows pain management.    HISTORY OF PRESENT ILLNESS     ID Statement: Katya Kaplan is a 69 year old female    Chief Complaint: \"No energy and has fatigue\"     Interval History:   Patient presents for evaluation.  Accompanied by her .  Presents in wheelchair.  Does not sleep well. Takes gabapentin. Neuropathy in legs at night.  Diagnosis of diabetes.  RLS. Spinal stenosis. Walk with walker at home. WC for long distances. Tired during " day from insomnia and naps during the day.  She stays up all night and sleeps during the day.       Trying to lose weight and become healthier.  States she has no stamina.  No energy and is fatigued.  History of pulmonary embolism and DVT following knee replacement surgery.  Remains on Eliquis.    Appetite okay, on Mounjaro since August 2024. Intentional weight loss 65 pounds. Bruising easy, on anticoagulation. Stent placed in April 2024. Taken off Plavix 6 months ago.    HX copd, sometimes cough.     Denies F/C/recent illness/infection, drenching night sweats, unintentional weight loss, anorexia/loss of appetite, HA, dizziness, lightheadedness, acute changes in vision/hearing, palpitations, CP, SOB, n/v/d/c/abd pain, bleeding, melena, urinary issues, haematuria, LAD, bone pain, sores, or rashes.    No specific bone pain other than arthritis pain.  She also has chronic back pain from stenosis.    PAST/CURRENT HISTORY     MEDICAL/SURGICAL HISTORY  Past Medical History:   Diagnosis Date    Acute non-ST elevation myocardial infarction (NSTEMI) (Multi) 05/19/2024    Angina pectoris 10/11/2023    Arthritis     Atrial fibrillation (Multi) 10/11/2023    Benign essential hypertension 09/04/2019    Chest discomfort 10/11/2023    Chronic obstructive pulmonary disease (Multi) 12/30/2018    CKD (chronic kidney disease) 09/09/2022    Coronary arteriosclerosis 10/11/2023    Edema 10/11/2023    Electrocardiogram abnormal 10/11/2023    Endometrial cancer (Multi) 2015    HTN (hypertension) 12/30/2018    Mixed hyperlipidemia 12/30/2018    PE (pulmonary thromboembolism) (Multi) 10/20/2021    Presence of coronary angioplasty implant and graft 04/08/2022    Type 2 diabetes mellitus without complications (Multi) 12/11/2018      Past Surgical History:   Procedure Laterality Date    CARDIAC CATHETERIZATION  04/2022    CHOLECYSTECTOMY  12/2016    CORONARY STENT PLACEMENT  04/22    FL GUIDED ASPIRATION OR INJECTION LARGE JOINT LEFT Left  2023    FL GUIDED ASPIRATION OR INJECTION LARGE [facility]INT [facility]    FL GUIDED ASPIRATION OR INJECTION LARGE JOINT RIGHT Right 2023    FL GUIDED ASPIRATION OR INJECTION LARGE JOINT RIGHT 2023 Ralph Triplett MD [facility]    HYSTERECTOMY  2015    JOINT REPLACEMENT  10/2010        SOCIAL HISTORY  -Lives with  Erickson (2 adult sons with grandchildren)  -Work place: Retired   -Tobacco/smokeless use: Quit  (15 years pack per day)  -Alcohol: Denies   -Illicit drug or marijuana use: Denies   -Restoration or Spiritual beliefs: Sabianist   -Social Determinates of Health Concerns: NA    FAMILY HISTORY  -Sister uterine caner  -Heart disease in paternal and maternal family   -Maternal Aunt uterine caner in age 40's  -Maternal Aunt breast  in age 40's  -Paternal Grandmother  at age 52 years old leukemia   -No other known history of hematologic, bleeding, clotting, autoimmune, genetic, or malignant disorders in the family.     OCCUPATIONAL/ENVIRONMENTAL HISTORY/EXPOSURES:  -[employer] years    Active Problems, Allergy List, Medication List, and PMH/PSH/FH/Social Hx have been reviewed and reconciled in chart. Updates made when necessary.     REVIEW OF SYSTEMS   A review of systems has been completed and are negative for complaints except what is stated in the assessment, HPI, IH, ROS, and/or past medical history.    ALLERGIES AND MEDICATIONS     Allergies and Intolerances:   Allergies   Allergen Reactions    Penicillins Swelling      Medication Profile:   Current Outpatient Medications   Medication Instructions    albuterol 2.5 mg /3 mL (0.083 %) nebulizer solution Use 1 vial (2.5 mg) by nebulization every 6 hours if needed for wheezing.    albuterol 90 mcg/actuation inhaler INHALE 2 PUFFS BY MOUTH FOUR TIMES A DAY AS NEEDED FOR WHEEZING    apixaban (Eliquis) 5 mg tablet TAKE 1 TABLET BY MOUTH TWO TIMES A DAY    blood-glucose meter,continuous  "(FreeStyle Vernell 3 [place]) misc Use as instructed    blood-glucose sensor (FreeStyle Vernell 3 Sensor) device Change sensor every 15 days as directed - Please dispense Vernell 3 plus sensors    ezetimibe (ZETIA) 10 mg, oral, Daily    fluticasone furoate-vilanteroL (Breo Ellipta) 200-25 mcg/dose inhaler INHALE 1 PUFF BY MOUTH ONE TIME DAILY    gabapentin (NEURONTIN) 600 mg, oral, 3 times daily    Lantus Solostar U-100 Insulin 40 Units, subcutaneous, Nightly    metoprolol succinate XL (TOPROL-XL) 50 mg, oral, Daily    Mounjaro 7.5 mg, subcutaneous, Every 7 days    omeprazole (PRILOSEC) 40 mg, oral, Daily    pen needle, diabetic 32 gauge x 5/32\" needle Use daily with insulin injection.    ranolazine (Ranexa) 500 mg 12 hr tablet TAKE 1 TABLET BY MOUTH TWO TIMES A DAY    Repatha SureClick 140 mg, subcutaneous, Every 14 days    spironolactone (ALDACTONE) 25 mg, oral, Daily      Available Vaccination Record:   Immunization History   Administered Date(s) Administered    Flu vaccine (IIV4), preservative free *Check age/dose* 10/06/2015    Flu vaccine, quadrivalent, high-dose, preservative free, age 65y+ (FLUZONE) 04/03/2022, 09/22/2023    Flu vaccine, trivalent, preservative free, HIGH-DOSE, age 65y+ (Fluzone) 10/01/2024    Influenza, Seasonal, Quadrivalent, Adjuvanted 10/13/2020    Influenza, Unspecified 01/25/2013, 12/12/2018    Influenza, seasonal, injectable 09/20/2019    Moderna COVID-19 vaccine, 12 years and older (50mcg/0.5mL)(Spikevax) 10/01/2024    Moderna SARS-CoV-2 Vaccination 03/11/2021, 04/07/2021, 12/14/2021    Pneumococcal conjugate vaccine, 13-valent (PREVNAR 13) 09/20/2019    Pneumococcal conjugate vaccine, 20-valent (PREVNAR 20) 09/22/2023    RESPIRATORY SYNCYTIAL VIRUS (RSV), ELIGIBLE PREGNANT PTS, 0.5 ML (ABRYSVO) 10/08/2023    Tdap vaccine, age 7 year and older (BOOSTRIX, ADACEL) 10/01/2024      PHYSICAL EXAM     Vital Signs/Measurements:       9/16/2024     2:06 PM 11/6/2024    10:17 AM 11/19/2024     1:15 " "PM 12/19/2024     4:10 PM 1/16/2025     1:00 PM 1/21/2025     3:04 PM 2/3/2025    10:46 AM   Vitals   Systolic  140  118  101 105   Diastolic  84  70  77 71   BP Location  Left arm  Right arm   Left arm   Heart Rate  79  73  76 73   Temp  35.9 °C (96.7 °F)  37 °C (98.6 °F)   35.5 °C (95.9 °F)   Resp    20  16 17   Height  1.613 m (5' 3.5\") 1.613 m (5' 3.5\") 1.613 m (5' 3.5\") 1.613 m (5' 3.5\")  1.596 m (5' 2.84\")    Weight (lb) 253.8 246 246 238 237 237 234.13   BMI 44.25 kg/m2 42.89 kg/m2 42.89 kg/m2 41.5 kg/m2 41.32 kg/m2 41.32 kg/m2 41.69 kg/m2   BSA (m2) 2.27 m2 2.24 m2 2.24 m2 2.2 m2 2.2 m2 2.2 m2 2.17 m2   Visit Report  Report  Report  Report Report       Significant value      Performance:   ECOG Performance Status: 2     Grade ECOG performance status   0 Fully active, able to carry on all pre-disease performance without restriction   1 Restricted in physically strenuous activity but ambulatory and able to carry out work of a light or sedentary nature, e.g., light housework, office work   2 Ambulatory and capable of all selfcare but unable to carry out any work activities; Up and about more than 50% of waking hours   3 Capable of only limited selfcare, confined to bed or chair more than 50% of waking hours   4 Completely disabled; Cannot carry out any selfcare; Totally confined to bed or chair   5 Dead     Physical Exam:  General: Patient is pleasant and awake/alert/oriented x3, no distress, nourished, hydrated, and cooperative, presents in wheelchair.  Patient is overweight.  Skin: Expected color for ethnicity, good turgor, dry, no prominent lesions, rashes, unusual bruising, or bleeding   Hair: Normal texture and distribution   Nails: Normal color, no deformities    HEENT:   Head: Normocephalic, atraumatic, no visible masses  Eyes: Conjunctiva clear, sclera non-icteric, no exudates or hemorrhages   Ears: nl appearance, hearing intact    Nose: no external lesions, mucosa non-inflamed, no rhinorrhea   Mouth: " Mucous membranes moist, no lesions, sores, bleeding, or erythema     Head/Neck: Neck supple, no apparent injury   Respiratory/Thorax: Patent airways, CTAB, diminished throughout, chest symmetry, normal inspiratory and expiratory effort    Cardiovascular: Regular rate and rhythm, no murmur    Gastrointestinal: Bowel sounds normal, non-distended, soft, no tenderness, no masses or hernia, or organomegaly appreciated    Genitourinary: Deferred   Musculoskeletal: Patient moves extremities, remained in wheelchair during exam   Extremities: No amputations or deformities, bilateral leg swelling nonpitting   Neurological: Sensation present to touch, intact senses, motor response and reflexes normal, normal strength   Breast:    Lymphatic: No significant lymphadenopathy   Psychological: Intact recent and remote memory, judgement, and insight. Appropriate mood, affect, and behavior          RESULTS/DATA     Labs:     Lab Results   Component Value Date    WBC 11.8 (H) 02/03/2025    NEUTROABS 9.16 (H) 02/03/2025    IGABSOL 0.06 02/03/2025    LYMPHSABS 1.83 02/03/2025    MONOSABS 0.61 02/03/2025    EOSABS 0.15 02/03/2025    BASOSABS 0.01 02/03/2025    RBC 5.13 02/03/2025    MCV 94 02/03/2025    MCHC 32.7 02/03/2025    HGB 15.7 02/03/2025    HCT 48.0 (H) 02/03/2025     02/03/2025     Lab Results   Component Value Date    RETICCTPCT 1.8 02/03/2025      Lab Results   Component Value Date    CREATININE 0.82 02/03/2025    BUN 33 (H) 02/03/2025    EGFR 77 02/03/2025     02/03/2025    K 4.1 02/03/2025     02/03/2025    CO2 25 02/03/2025      Lab Results   Component Value Date    ALT 10 02/03/2025    AST 9 02/03/2025    ALKPHOS 53 02/03/2025    BILITOT 0.5 02/03/2025      Lab Results   Component Value Date    TSH 0.84 09/01/2023     Lab Results   Component Value Date    TSH 0.84 09/01/2023     Lab Results   Component Value Date    IRON 61 02/03/2025    TIBC 262 02/03/2025    FERRITIN 308 (H) 02/03/2025      Lab Results    Component Value Date    JNBDCSMF48 313 02/03/2025      Lab Results   Component Value Date    FOLATE 7.4 02/03/2025     Lab Results   Component Value Date    SEDRATE 12 02/03/2025      Lab Results   Component Value Date    CRP 0.39 02/03/2025     Lab Results   Component Value Date     02/03/2025     Lab Results   Component Value Date    HAPTOGLOBIN 191 02/03/2025        Radiology/Studies:   Please see above    ASSESSMENT/PLAN     Assessment and Plan:   #1. Polycythemia   69-year-old  female presents for evaluation of elevated red blood cells.  Referred by primary care group. Per medical review of records polycythemia dates back to June 2023 with hemoglobins as high as 16.7 and hematocrit as high as 51.2.  Leukocytosis dating back to April 2022 with WBCs as high as 18 with immature granulocytes noted most recently since June 2024.  Neutrophilia as high as 13.86 dating back to April 2022.     Extensive medical history including A-fib, coronary arterial sclerosis, acute non-ST elevation myocardial infarction, implant and graft with coronary angioplasty, angina pectoris, mixed hyperlipidemia, hypertension, pulmonary embolism on Eliquis, diabetes mellitus, morbid obesity, thyroid nodule, hyperglycemia, chronic kidney disease and urinary tract infection, abnormal liver enzyme results, endometrial cancer, spondylolithiasis of L4 and L5, arthritis, osteoarthritis, total knee replacement, lumbar stenosis with neurogenic claudication, COPD, primary insomnia, and syncope.    Hysterectomy 2015 endometrial cancer.   2017 Colonoscopy [facility] reports normal. Removed one polyp. Due now.     Discussed polycythemia primary and secondary causes.  Patient does have COPD.  She denies having sleep apnea or ever being checked.  She has insomnia and does not sleep well and is up all night.  This could be a contributing factor.  The patient is overweight, is attempting to lose weight and eat healthier.  She is more  sedentary due to her chronic pain.  She is receiving steroid injections which may contribute to leukocytosis.  We will order a flow cytometry and myeloid malignancy panel.  Assess inflammatory markers, erythropoietin, SPEP, nutritional values.  Advised an ultrasound to evaluate liver and spleen.  Multiple MRIs of spine show stenosis.  We will meet in 3 weeks to review results.  Advised colonoscopy as she is due.      **Please follow with specialties as scheduled for other health needs and routine screenings.**    Follow up:    RTC:  -3 weeks to review workup     Medications:  -NA    Imaging/Testing:  -US Abdomen     Referral(s):  -GI due for colonoscopy     Other Pertinent Appointments:  -Procedure 2/5/2025  -Primary care 3/24/2025  -Cardiology 6/5/2025  -Nephrology 8/4/2025      Patient Discussion Summary:  Discussed plan of care in detail. Patient states understanding and in agreement to the plan. Answered all questions to there liking. The patient will call with any additional questions and/or concerns.    Thank you for allowing me to participate in your care. It was a pleasure meeting you.    Sincerely,  MODESTO Moctezuma-CNP     Hematology/Oncology Clinical Nurse Practitioner     [facility]   [address]  [address]  [place] [id number]  Office #: [phone]    DISCLAIMER:   In preparing for this visit and writing this note, I reviewed all the previous electronic medical records (testing, labs, imaging, and other procedures, provider notes, and medical charts) of the patient available in the physician portal pertinent to patient care. Significant findings which helped in decision making are recorded in this chart.      This document may have been written by voice recognition software.  There may be some incorrect wording, spelling and/or spelling errors or punctuation errors that were not corrected prior to committing the note to the medical record. Please request  clarification if there is documentation error or clarification is needed.   Time based billing: Please see documentation within this specific encounter.

## 2025-02-04 ENCOUNTER — TELEPHONE (OUTPATIENT)
Dept: OPERATING ROOM | Facility: CLINIC | Age: 70
End: 2025-02-04

## 2025-02-04 DIAGNOSIS — D75.1 POLYCYTHEMIA: Primary | ICD-10-CM

## 2025-02-04 DIAGNOSIS — R71.8 ELEVATED RED BLOOD CELL COUNT: Primary | ICD-10-CM

## 2025-02-04 NOTE — H&P
"Pain Management H&P    History Of Present Illness  Katya Kaplan \"Kat" is a 69 y.o. female presents for procedure state below. Endorses no changes in past medical history or medical health since last seen in clinic.      Past Medical History  She has a past medical history of Acute non-ST elevation myocardial infarction (NSTEMI) (Multi) (05/19/2024), Angina pectoris (10/11/2023), Arthritis, Atrial fibrillation (Multi) (10/11/2023), Benign essential hypertension (09/04/2019), Chest discomfort (10/11/2023), Chronic obstructive pulmonary disease (Multi) (12/30/2018), CKD (chronic kidney disease) (09/09/2022), Coronary arteriosclerosis (10/11/2023), Edema (10/11/2023), Electrocardiogram abnormal (10/11/2023), Endometrial cancer (Multi) (2015), HTN (hypertension) (12/30/2018), Mixed hyperlipidemia (12/30/2018), PE (pulmonary thromboembolism) (Multi) (10/20/2021), Presence of coronary angioplasty implant and graft (04/08/2022), and Type 2 diabetes mellitus without complications (Multi) (12/11/2018).    Surgical History  She has a past surgical history that includes FL guided aspiration or injection large joint right (Right, 12/14/2023); FL guided aspiration or injection large joint left (Left, 12/18/2023); Cardiac catheterization (04/2022); Cholecystectomy (12/2016); Hysterectomy (08/2015); Joint replacement (10/2010); and Coronary stent placement (04/22).     Social History  She reports that she quit smoking about 27 years ago. Her smoking use included cigarettes. She has never been exposed to tobacco smoke. She has never used smokeless tobacco. She reports that she does not drink alcohol and does not use drugs.    Family History  Family History   Problem Relation Name Age of Onset    Arthritis Mother Betty Villeda     Diabetes Mother Betty Villeda     Heart disease Mother Betty Villeda     Hypertension Mother Betty Villeda     Miscarriages / Stillbirths Mother Betty Villeda     Arthritis " Father Vijay Villeda     Cancer Father Vijay Villeda     Diabetes Father Vijay Villeda     Heart disease Father Vijay Villeda     Stroke Maternal Grandmother Inés Licea     Arthritis Sister Mera Gibson     Cancer Sister Mera Gibson     Diabetes Sister Mera Gibson     Hypertension Sister Mera Gibson     Ovarian cancer Sister Mera Gibson     Cancer Mother's Sister Yahaira De Oliveira     Stroke Mother's Sister Shanda Lundberg         Allergies  Penicillins    Review of Symptoms:   Constitutional: Negative for chills, diaphoresis or fever  HENT: Negative for neck swelling  Eyes:.  Negative for eye pain  Respiratory:.  Negative for cough, shortness of breath or wheezing    Cardiovascular:.  Negative for chest pain or palpitations  Gastrointestinal:.  Negative for abdominal pain, nausea and vomiting  Genitourinary:.  Negative for urgency  Musculoskeletal:  Positive for back pain. Positive for joint pain. Denies falls within the past 3 months.  Skin: Negative for wounds or itching   Neurological: Negative for dizziness, seizures, loss of consciousness and weakness  Endo/Heme/Allergies: Does not bruise/bleed easily  Psychiatric/Behavioral: Negative for depression. The patient does not appear anxious.      Pre-sedation Evaluation  ASA class 3  Mallampati score 3     PHYSICAL EXAM  Vitals signs reviewed  Constitutional:       General: Not in acute distress     Appearance: Normal appearance. Not ill-appearing.  HENT:     Head: Normocephalic and atraumatic  Eyes:     Conjunctiva/sclera: Conjunctivae normal  Cardiovascular:     Rate and Rhythm: Normal rate and regular rhythm  Pulmonary:     Effort: No respiratory distress  Abdominal:     Palpations: Abdomen is soft  Musculoskeletal: CAMPUZANO  Skin:     General: Skin is warm and dry  Neurological:     General: No focal deficit present  Psychiatric:         Mood and Affect: Mood normal         Behavior: Behavior normal     Last Recorded Vitals  There were no vitals taken  "for this visit.    Relevant Results  Current Outpatient Medications   Medication Instructions    albuterol 2.5 mg /3 mL (0.083 %) nebulizer solution Use 1 vial (2.5 mg) by nebulization every 6 hours if needed for wheezing.    albuterol 90 mcg/actuation inhaler INHALE 2 PUFFS BY MOUTH FOUR TIMES A DAY AS NEEDED FOR WHEEZING    apixaban (Eliquis) 5 mg tablet TAKE 1 TABLET BY MOUTH TWO TIMES A DAY    blood-glucose meter,continuous (FreeStyle Vernell 3 Mcloud) [facility] Use as instructed    blood-glucose sensor (FreeStyle Vernell 3 Sensor) device Change sensor every 15 days as directed - Please dispense Vernell 3 plus sensors    ezetimibe (ZETIA) 10 mg, oral, Daily    fluticasone furoate-vilanteroL (Breo Ellipta) 200-25 mcg/dose inhaler INHALE 1 PUFF BY MOUTH ONE TIME DAILY    gabapentin (NEURONTIN) 600 mg, oral, 3 times daily    Lantus Solostar U-100 Insulin 40 Units, subcutaneous, Nightly    metoprolol succinate XL (TOPROL-XL) 50 mg, oral, Daily    Mounjaro 7.5 mg, subcutaneous, Every 7 days    omeprazole (PRILOSEC) 40 mg, oral, Daily    pen needle, diabetic 32 gauge x 5/32\" needle Use daily with insulin injection.    ranolazine (Ranexa) 500 mg 12 hr tablet TAKE 1 TABLET BY MOUTH TWO TIMES A DAY    Repatha SureClick 140 mg, subcutaneous, Every 14 days    spironolactone (ALDACTONE) 25 mg, oral, Daily         MR lumbar spine wo IV contrast 07/23/2024    Narrative  Interpreted By:  Marco Jimenez,  STUDY:  MR LUMBAR SPINE WO IV CONTRAST; ;  7/23/2024 1:25 pm    INDICATION:  Signs/Symptoms:Back pain with claudication symptoms.    COMPARISON:  CT abdomen and pelvis from 6/11/2023.    ACCESSION NUMBER(S):  [id number]    ORDERING CLINICIAN:  MAI MARTINES    TECHNIQUE:  MRI of the lumbar spine was performed with the acquisition of  sagittal T2, sagittal T1, sagittal STIR, axial T1, and axial T2  weighted sequences.    FINDINGS:  This report assumes 5 non-rib bearing lumbar vertebral bodies. The  lowest intervertebral disc will be " labeled L5-S1.    There is grade 1 anterolisthesis at L4-L5, unchanged. Vertebral body  heights are maintained. There is mild intervertebral disc height  narrowing at L3-L4. There are chronic degenerative endplate changes  at multiple levels including degenerative Schmorl's node and  osteophyte formation. There is slight STIR hyperintense signal  located within the L3-L4 endplates which may reflect degenerative  osseous edema versus reactive hypervascularity. Marrow signal is  heterogeneous in appearance. The conus medullaris terminates at the  level of L2 and is unremarkable in appearance.    At T12-L1, there is no posterior disc contour abnormality. There is  no spinal canal stenosis or neural foraminal narrowing. There is no  facet osteoarthropathy.    At L1-L2, there is a small diffuse disc bulge. There is no spinal  canal stenosis. There is no neural foraminal narrowing. There is no  facet osteoarthropathy.    At L2-L3, there is a small diffuse disc bulge. There is no spinal  canal stenosis or neural foraminal narrowing. There is mild right  facet osteoarthropathy.    At L3-L4, there is moderate-to-marked spinal canal stenosis due to  diffuse disc bulge, prominent posterior epidural fat, ligamentum  flavum thickening, and marked facet osteoarthropathy. There is  extension of disc into the bilateral neural foramina causing mild  bilateral neural foraminal narrowing. There is degenerative fluid  causing mild expansion of the facet joints.    At L4-L5, there is minimal spinal canal stenosis due to grade 1  anterolisthesis, ligamentum flavum thickening, and marked facet  osteoarthropathy. There is extension of disc into the neural foramina  but there is no striking neural foraminal narrowing.    At L5-S1, there is no posterior disc contour abnormality. There is no  spinal canal stenosis or neural foraminal narrowing. There is marked  bilateral facet osteoarthropathy.    Partially visualized large cyst in the left  kidney.    Impression  1. Moderate-to-marked spinal canal stenosis at L3-L4 due to  combination of degenerative changes and prominent posterior epidural  fat.    2. Additional multilevel degenerative changes of the lumbar spine as  detailed above.    This study was interpreted at [facility].    MACRO:  None    Signed by: Marco Jimenez 7/23/2024 2:51 PM  Dictation workstation:   [id number]     Epidural Steroid Injection  Table formatting from the original result was not included.  Procedure  Epidural Steroid Injection    Indication  Lumbar stenosis with neurogenic claudication    Medications  lidocaine (Xylocaine) 5 mg/mL (0.5 %) injection 10 mL   iohexol (OMNIPaque) 240 mg iodine/mL solution 2 mL   methylPREDNISolone acetate (DEPO-Medrol) injection 40 mg   (Totals for administrations occurring from 1153 to 1207 on 08/21/24)     Preprocedure  A history and physical has been performed, and patient medication   allergies have been reviewed. The patient's tolerance of previous   anesthesia has been reviewed. The risks and benefits of the procedure and   the sedation options and risks were discussed with the patient. All   questions were answered and informed consent obtained.    Details of the Procedure  Consent:   Consent obtained:  Written  Consent given by:  Patient  Risks, benefits, and alternatives were discussed: yes    Risks discussed:  Bleeding, infection, pain and nerve damage  Alternatives discussed:  No treatment, delayed treatment and alternative   treatment  Universal protocol:   Procedure explained and questions answered to patient or proxy's   satisfaction: yes    Relevant documents present and verified: yes    Test results available: yes    Imaging studies available: yes    Required blood products, implants, devices, and special equipment   available: yes    Site/side marked: yes    Immediately prior to procedure, a time out was called: yes    Patient identity  "confirmed:  Verbally with patient, hospital-assigned   identification number and arm band  Procedure specific details:     Lumbar interlaminar FERNANDA     After informed consent was obtained, the patient was brought to the   operating room and placed in the prone position.  The back area was   prepped and draped in usual sterile fashion.  Using fluoroscopic guidance,   the skin and subcutaneous tissue overlying needle trajectory to the below   interlaminar epidural space were anesthetized with a total of 5 mL of 0.5%   lidocaine in the below paraspinous approach.  An 18-gauge Tuohy needle was   then advanced under fluoroscopic guidance with the below paraspinous   approach into the below  interlaminar epidural space. Entry of the   epidural space was confirmed using loss of resistance technique with a   glass syringe and 2 mL of air.  Injection of Iohexol contrast revealed   appropriate spread without vascular uptake. Subsequently, the below   medications were injected.  The needle was removed.  The patient tolerated   the procedure well.  The patient was then transferred to recovery room in   stable condition. The patient will participate in physical therapy, update   us on his/her response, follow up with us on outpatient basis as needed.    Level: L3-L4  Medications [4 mL of 0.5% lidocaine and 40 mg methylprednisolone]    Procedure Provider  Gilmer Victor MD    [place]  [facility] [address]  [facility] [address] [facility] [place]  [address] [facility] [place] [address]    Referring Provider  Gilmer Victor [place]  [address] [place] [address]       No diagnosis found.     ASSESSMENT/PLAN  Katya Kaplan \"Mariam\" is a 69 y.o. female here for bilateral L3 transforaminal FERNANDA under fluoroscopy    Patient denies any recent antibiotic use or infections, denies any blood thinner use, and denies contrast or local anesthetic allergies     Risks, benefits, alternatives discussed. All questions answered to the best of my " ability. Patient agrees to proceed.      Our plan is as follows:  - Proceed with aforementioned procedure          DO NAHOMY Reddy Pain fellow

## 2025-02-05 ENCOUNTER — HOSPITAL ENCOUNTER (OUTPATIENT)
Dept: OPERATING ROOM | Facility: CLINIC | Age: 70
Setting detail: OUTPATIENT SURGERY
Discharge: HOME | End: 2025-02-05
Payer: MEDICARE

## 2025-02-05 VITALS
TEMPERATURE: 97.2 F | HEIGHT: 63 IN | DIASTOLIC BLOOD PRESSURE: 68 MMHG | SYSTOLIC BLOOD PRESSURE: 110 MMHG | RESPIRATION RATE: 16 BRPM | WEIGHT: 233 LBS | BODY MASS INDEX: 41.29 KG/M2 | HEART RATE: 70 BPM | OXYGEN SATURATION: 96 %

## 2025-02-05 DIAGNOSIS — M48.062 LUMBAR STENOSIS WITH NEUROGENIC CLAUDICATION: ICD-10-CM

## 2025-02-05 LAB — EPO SERPL-ACNC: 11 MU/ML (ref 4–27)

## 2025-02-05 PROCEDURE — 3600000006 HC OR TIME - EACH INCREMENTAL 1 MINUTE - PROCEDURE LEVEL ONE

## 2025-02-05 PROCEDURE — 2550000001 HC RX 255 CONTRASTS: Performed by: PHYSICAL MEDICINE & REHABILITATION

## 2025-02-05 PROCEDURE — 64483 NJX AA&/STRD TFRM EPI L/S 1: CPT | Performed by: PHYSICAL MEDICINE & REHABILITATION

## 2025-02-05 PROCEDURE — 7100000010 HC PHASE TWO TIME - EACH INCREMENTAL 1 MINUTE

## 2025-02-05 PROCEDURE — 7100000009 HC PHASE TWO TIME - INITIAL BASE CHARGE

## 2025-02-05 PROCEDURE — 2500000002 HC RX 250 W HCPCS SELF ADMINISTERED DRUGS (ALT 637 FOR MEDICARE OP, ALT 636 FOR OP/ED): Mod: MUE | Performed by: PHYSICAL MEDICINE & REHABILITATION

## 2025-02-05 PROCEDURE — 3600000001 HC OR TIME - INITIAL BASE CHARGE - PROCEDURE LEVEL ONE

## 2025-02-05 PROCEDURE — 2500000004 HC RX 250 GENERAL PHARMACY W/ HCPCS (ALT 636 FOR OP/ED): Performed by: PHYSICAL MEDICINE & REHABILITATION

## 2025-02-05 RX ORDER — LIDOCAINE HYDROCHLORIDE 5 MG/ML
INJECTION, SOLUTION INFILTRATION; INTRAVENOUS AS NEEDED
Status: COMPLETED | OUTPATIENT
Start: 2025-02-05 | End: 2025-02-05

## 2025-02-05 RX ORDER — DIAZEPAM 5 MG/1
5 TABLET ORAL ONCE AS NEEDED
Status: COMPLETED | OUTPATIENT
Start: 2025-02-05 | End: 2025-02-05

## 2025-02-05 RX ORDER — DEXAMETHASONE SODIUM PHOSPHATE 10 MG/ML
INJECTION INTRAMUSCULAR; INTRAVENOUS AS NEEDED
Status: COMPLETED | OUTPATIENT
Start: 2025-02-05 | End: 2025-02-05

## 2025-02-05 RX ADMIN — LIDOCAINE HYDROCHLORIDE 5 ML: 5 INJECTION, SOLUTION INFILTRATION at 11:49

## 2025-02-05 RX ADMIN — DEXAMETHASONE SODIUM PHOSPHATE 10 MG: 10 INJECTION, SOLUTION INTRAMUSCULAR; INTRAVENOUS at 11:49

## 2025-02-05 RX ADMIN — DIAZEPAM 5 MG: 5 TABLET ORAL at 11:21

## 2025-02-05 RX ADMIN — IOHEXOL 3 ML: 240 INJECTION, SOLUTION INTRATHECAL; INTRAVASCULAR; INTRAVENOUS; ORAL at 11:49

## 2025-02-05 ASSESSMENT — PAIN SCALES - GENERAL
PAINLEVEL_OUTOF10: 0 - NO PAIN
PAINLEVEL_OUTOF10: 8

## 2025-02-05 ASSESSMENT — PAIN - FUNCTIONAL ASSESSMENT: PAIN_FUNCTIONAL_ASSESSMENT: 0-10

## 2025-02-05 NOTE — DISCHARGE INSTRUCTIONS
Discharge Instructions for Anesthesiology Pain Service Patients - Dr. Victor    Observe the needle site for excessive bleeding (slow general oozing that completely soaks the dressing or fresh bright red bleeding).  In either case, apply pressure to the area, elevate it if possible and call your doctor at once.    Observe/monitor for the following signs and symptoms:         Needle site:  change in color, numbness or tingling, coldness to touch, swelling, drainage       Temperature of 101.5 or higher       Increased or uncontrollable pain    If you notice any of the above signs or symptoms, please call your doctor at once.    Your pain may not be gone immediately after this procedure; it generally takes 3 to 5 days for the steroid to work.    Keep the needle site clean and dry for 24 hours.    Continue your present medications.    No driving or operating machinery for 24 hours if you have received sedation.    Make an appointment to see you doctor in 2-3 weeks    Central Scheduling Number:  [phone]  Dr. Victor's office:  [phone]  Dr. Victor's line:  [phone]  After hours Pain Management:  [phone].    If any problems occur, or if you have further questions, please call you doctor as soon as possible.  If you find that you cannot reach your doctor, but feel that your condition needs a doctors attention, go to the  emergency room nearest your home.

## 2025-02-06 LAB
ALBUMIN: 3.5 G/DL (ref 3.4–5)
ALPHA 1 GLOBULIN: 0.3 G/DL (ref 0.2–0.6)
ALPHA 2 GLOBULIN: 0.9 G/DL (ref 0.4–1.1)
BETA GLOBULIN: 0.8 G/DL (ref 0.5–1.2)
GAMMA GLOBULIN: 0.7 G/DL (ref 0.5–1.4)
IMMUNOFIXATION COMMENT: NORMAL
PATH REVIEW - SERUM IMMUNOFIXATION: NORMAL
PATH REVIEW-SERUM PROTEIN ELECTROPHORESIS: NORMAL
PROTEIN ELECTROPHORESIS COMMENT: NORMAL

## 2025-02-06 ASSESSMENT — PAIN SCALES - GENERAL: PAINLEVEL_OUTOF10: 1

## 2025-02-06 NOTE — ADDENDUM NOTE
Encounter addended by: Lupe Barone RN on: 2/6/2025 9:36 AM   Actions taken: Contacts section saved, Flowsheet accepted

## 2025-02-07 LAB
ELECTRONICALLY SIGNED BY: NORMAL
MYELOID NGS RESULTS: NORMAL

## 2025-02-10 ENCOUNTER — PHARMACY VISIT (OUTPATIENT)
Dept: PHARMACY | Facility: CLINIC | Age: 70
End: 2025-02-10
Payer: MEDICARE

## 2025-02-10 PROCEDURE — RXMED WILLOW AMBULATORY MEDICATION CHARGE

## 2025-02-11 PROCEDURE — RXMED WILLOW AMBULATORY MEDICATION CHARGE

## 2025-02-12 ENCOUNTER — PHARMACY VISIT (OUTPATIENT)
Dept: PHARMACY | Facility: CLINIC | Age: 70
End: 2025-02-12
Payer: MEDICARE

## 2025-02-12 DIAGNOSIS — J44.9 COPD MIXED TYPE (MULTI): ICD-10-CM

## 2025-02-13 ENCOUNTER — PHARMACY VISIT (OUTPATIENT)
Dept: PHARMACY | Facility: CLINIC | Age: 70
End: 2025-02-13
Payer: MEDICARE

## 2025-02-13 PROCEDURE — RXMED WILLOW AMBULATORY MEDICATION CHARGE

## 2025-02-13 RX ORDER — FLUTICASONE FUROATE AND VILANTEROL 200; 25 UG/1; UG/1
1 POWDER RESPIRATORY (INHALATION) DAILY
Qty: 180 EACH | Refills: 1 | Status: SHIPPED | OUTPATIENT
Start: 2025-02-13 | End: 2026-02-13

## 2025-02-17 ENCOUNTER — APPOINTMENT (OUTPATIENT)
Dept: RADIOLOGY | Facility: CLINIC | Age: 70
End: 2025-02-17
Payer: MEDICARE

## 2025-02-17 ENCOUNTER — HOSPITAL ENCOUNTER (OUTPATIENT)
Dept: RADIOLOGY | Facility: CLINIC | Age: 70
Discharge: HOME | End: 2025-02-17
Payer: MEDICARE

## 2025-02-17 ENCOUNTER — PHARMACY VISIT (OUTPATIENT)
Dept: PHARMACY | Facility: CLINIC | Age: 70
End: 2025-02-17
Payer: MEDICARE

## 2025-02-17 DIAGNOSIS — R71.8 ELEVATED RED BLOOD CELL COUNT: ICD-10-CM

## 2025-02-17 PROCEDURE — 76700 US EXAM ABDOM COMPLETE: CPT

## 2025-02-17 PROCEDURE — 76700 US EXAM ABDOM COMPLETE: CPT | Performed by: RADIOLOGY

## 2025-02-18 LAB
ALBUMIN SERPL-MCNC: 4.1 G/DL (ref 3.6–5.1)
ALP SERPL-CCNC: 56 U/L (ref 37–153)
ALT SERPL-CCNC: 9 U/L (ref 6–29)
ANION GAP SERPL CALCULATED.4IONS-SCNC: 12 MMOL/L (CALC) (ref 7–17)
AST SERPL-CCNC: 10 U/L (ref 10–35)
BILIRUB SERPL-MCNC: 0.5 MG/DL (ref 0.2–1.2)
BUN SERPL-MCNC: 16 MG/DL (ref 7–25)
CALCIUM SERPL-MCNC: 9.3 MG/DL (ref 8.6–10.4)
CHLORIDE SERPL-SCNC: 104 MMOL/L (ref 98–110)
CHOLEST SERPL-MCNC: 151 MG/DL
CHOLEST/HDLC SERPL: 4 (CALC)
CO2 SERPL-SCNC: 24 MMOL/L (ref 20–32)
CREAT SERPL-MCNC: 0.77 MG/DL (ref 0.6–1)
EGFRCR SERPLBLD CKD-EPI 2021: 83 ML/MIN/1.73M2
EST. AVERAGE GLUCOSE BLD GHB EST-MCNC: 105 MG/DL
EST. AVERAGE GLUCOSE BLD GHB EST-SCNC: 5.8 MMOL/L
GLUCOSE SERPL-MCNC: 110 MG/DL (ref 65–99)
HBA1C MFR BLD: 5.3 % OF TOTAL HGB
HDLC SERPL-MCNC: 38 MG/DL
LDLC SERPL CALC-MCNC: 81 MG/DL (CALC)
NONHDLC SERPL-MCNC: 113 MG/DL (CALC)
POTASSIUM SERPL-SCNC: 4.7 MMOL/L (ref 3.5–5.3)
PROT SERPL-MCNC: 6.5 G/DL (ref 6.1–8.1)
SODIUM SERPL-SCNC: 140 MMOL/L (ref 135–146)
TRIGL SERPL-MCNC: 227 MG/DL

## 2025-02-18 PROCEDURE — RXMED WILLOW AMBULATORY MEDICATION CHARGE

## 2025-02-20 ENCOUNTER — PHARMACY VISIT (OUTPATIENT)
Dept: PHARMACY | Facility: CLINIC | Age: 70
End: 2025-02-20
Payer: MEDICARE

## 2025-02-24 DIAGNOSIS — M48.062 LUMBAR STENOSIS WITH NEUROGENIC CLAUDICATION: ICD-10-CM

## 2025-02-24 RX ORDER — GABAPENTIN 300 MG/1
600 CAPSULE ORAL 3 TIMES DAILY
Qty: 180 CAPSULE | Refills: 3 | Status: CANCELLED | OUTPATIENT
Start: 2025-02-24 | End: 2025-06-24

## 2025-02-25 ENCOUNTER — TELEMEDICINE (OUTPATIENT)
Dept: HEMATOLOGY/ONCOLOGY | Facility: CLINIC | Age: 70
End: 2025-02-25
Payer: MEDICARE

## 2025-02-25 DIAGNOSIS — Z15.89 GENE MUTATION: Primary | ICD-10-CM

## 2025-02-25 DIAGNOSIS — M89.8X9 BONE PAIN: ICD-10-CM

## 2025-02-25 DIAGNOSIS — D75.1 POLYCYTHEMIA: ICD-10-CM

## 2025-02-25 DIAGNOSIS — R53.83 OTHER FATIGUE: ICD-10-CM

## 2025-02-25 PROCEDURE — 1123F ACP DISCUSS/DSCN MKR DOCD: CPT

## 2025-02-25 PROCEDURE — 99214 OFFICE O/P EST MOD 30 MIN: CPT

## 2025-02-25 NOTE — PROGRESS NOTES
[facility] [place]    PATIENT VISIT INFORMATION    Visit Type: Follow up Visit  I performed this visit using real-time telehealth tools, including an audio/video connection between (Katya Kaplan at home) and (TRISTA Moctezuma at [facility])  Patient consents to telemedicine service today and understands the limitations of the visit, no physical examination and all issues may not be able to be addressed today, other than brief neuro and psych assessment.   Referring Provider: TRISTA Lakhani  Reason for referral: Elevated red blood cells  Primary Practice Provider: Trupti Greene MD    CANCER/HEMATOLGOY HISTORY    69-year-old  female presents for evaluation of elevated red blood cells.  Referred by primary care group. Per medical review of records polycythemia dates back to June 2023 with hemoglobins as high as 16.7 and hematocrit as high as 51.2.  Leukocytosis dating back to April 2022 with WBCs as high as 18 with immature granulocytes noted most recently since June 2024.  Neutrophilia as high as 13.86 dating back to April 2022.     Extensive medical history including A-fib, coronary arterial sclerosis, acute non-ST elevation myocardial infarction, implant and graft with coronary angioplasty, angina pectoris, mixed hyperlipidemia, hypertension, pulmonary embolism on Eliquis, diabetes mellitus, morbid obesity, thyroid nodule, hyperglycemia, chronic kidney disease and urinary tract infection, abnormal liver enzyme results, endometrial cancer, spondylolithiasis of L4 and L5, arthritis, osteoarthritis, total knee replacement, lumbar stenosis with neurogenic claudication, COPD, primary insomnia, and syncope.    Hysterectomy 2015 endometrial cancer.   2017 Colonoscopy [facility] reports normal. Removed one polyp. Due now.   Receives steroid injections in her back and her knees every 3 months throughout.  Follows pain management.    HISTORY OF PRESENT ILLNESS  "    ID Statement: Katya Kaplan is a 69 year old female    Chief Complaint: \"No energy and has fatigue\"     Interval History:   Patient presents for evaluation.  Accompanied by her .  Presents in wheelchair.  Does not sleep well. Takes gabapentin. Neuropathy in legs at night.  Diagnosis of diabetes.  RLS. Spinal stenosis. Walk with walker at home. WC for long distances. Tired during day from insomnia and naps during the day.  She stays up all night and sleeps during the day.       Trying to lose weight and become healthier.  States she has no stamina.  No energy and is fatigued.  History of pulmonary embolism and DVT following knee replacement surgery.  Remains on Eliquis.    Appetite okay, on Mounjaro since August 2024. Intentional weight loss 65 pounds. Bruising easy, on anticoagulation. Stent placed in April 2024. Taken off Plavix 6 months ago.    HX copd, sometimes cough.     Denies F/C/recent illness/infection, drenching night sweats, unintentional weight loss, anorexia/loss of appetite, HA, dizziness, lightheadedness, acute changes in vision/hearing, palpitations, CP, SOB, n/v/d/c/abd pain, bleeding, melena, urinary issues, haematuria, LAD, bone pain, sores, or rashes.    No specific bone pain other than arthritis pain.  She also has chronic back pain from stenosis.    PAST/CURRENT HISTORY     MEDICAL/SURGICAL HISTORY  Past Medical History:   Diagnosis Date    Acute non-ST elevation myocardial infarction (NSTEMI) (Multi) 05/19/2024    Angina pectoris 10/11/2023    Arthritis     Atrial fibrillation (Multi) 10/11/2023    Benign essential hypertension 09/04/2019    Chest discomfort 10/11/2023    Chronic obstructive pulmonary disease (Multi) 12/30/2018    CKD (chronic kidney disease) 09/09/2022    Coronary arteriosclerosis 10/11/2023    Edema 10/11/2023    Electrocardiogram abnormal 10/11/2023    Endometrial cancer (Multi) 2015    HTN (hypertension) 12/30/2018    Mixed hyperlipidemia 12/30/2018    PE " (pulmonary thromboembolism) (Multi) 10/20/2021    Presence of coronary angioplasty implant and graft 2022    Type 2 diabetes mellitus without complications (Multi) 2018      Past Surgical History:   Procedure Laterality Date    CARDIAC CATHETERIZATION  2022    CHOLECYSTECTOMY  2016    CORONARY STENT PLACEMENT      FL GUIDED ASPIRATION OR INJECTION LARGE JOINT LEFT Left 2023    FL GUIDED ASPIRATION OR INJECTION LARGE JOINT LEFT 2023 Ralph Triplett MD [facility]    FL GUIDED ASPIRATION OR INJECTION LARGE JOINT RIGHT Right 2023    FL GUIDED ASPIRATION OR INJECTION LARGE JOINT RIGHT 2023 Ralph Triplett MD [facility]    HYSTERECTOMY  2015    JOINT REPLACEMENT  10/2010        SOCIAL HISTORY  -Lives with  Erickson (2 adult sons with grandchildren)  -Work place: Retired   -Tobacco/smokeless use: Quit  (15 years pack per day)  -Alcohol: Denies   -Illicit drug or marijuana use: Denies   -Mormon or Spiritual beliefs: Baptist   -Social Determinates of Health Concerns: NA    FAMILY HISTORY  -Sister uterine caner  -Heart disease in paternal and maternal family   -Maternal Aunt uterine caner in age 40's  -Maternal Aunt breast  in age 40's  -Paternal Grandmother  at age 52 years old leukemia   -No other known history of hematologic, bleeding, clotting, autoimmune, genetic, or malignant disorders in the family.     OCCUPATIONAL/ENVIRONMENTAL HISTORY/EXPOSURES:  -[employer] 44 years    Active Problems, Allergy List, Medication List, and PMH/PSH/FH/Social Hx have been reviewed and reconciled in chart. Updates made when necessary.     REVIEW OF SYSTEMS   A review of systems has been completed and are negative for complaints except what is stated in the assessment, HPI, IH, ROS, and/or past medical history.    ALLERGIES AND MEDICATIONS     Allergies and Intolerances:   Allergies   Allergen Reactions    Penicillins Swelling      Medication Profile:  "  Current Outpatient Medications   Medication Instructions    albuterol 2.5 mg /3 mL (0.083 %) nebulizer solution Use 1 vial (2.5 mg) by nebulization every 6 hours if needed for wheezing.    albuterol 90 mcg/actuation inhaler INHALE 2 PUFFS BY MOUTH FOUR TIMES A DAY AS NEEDED FOR WHEEZING    apixaban (Eliquis) 5 mg tablet TAKE 1 TABLET BY MOUTH TWO TIMES A DAY    blood-glucose meter,continuous (FreeStyle [place]ell 3 [place]) [facility] [place] as instructed    blood-glucose sensor (FreeStyle Vernell 3 Sensor) device Change sensor every 15 days as directed - Please dispense Vernell 3 plus sensors    ezetimibe (ZETIA) 10 mg, oral, Daily    fluticasone furoate-vilanteroL (Breo Ellipta) 200-25 mcg/dose inhaler INHALE 1 PUFF BY MOUTH ONE TIME DAILY    gabapentin (NEURONTIN) 600 mg, oral, 3 times daily    Lantus Solostar U-100 Insulin 40 Units, subcutaneous, Nightly    metoprolol succinate XL (TOPROL-XL) 50 mg, oral, Daily    Mounjaro 7.5 mg, subcutaneous, Every 7 days    omeprazole (PRILOSEC) 40 mg, oral, Daily    pen needle, diabetic 32 gauge x 5/32\" needle Use daily with insulin injection.    ranolazine (Ranexa) 500 mg 12 hr tablet TAKE 1 TABLET BY MOUTH TWO TIMES A DAY    Repatha SureClick 140 mg, subcutaneous, Every 14 days    spironolactone (ALDACTONE) 25 mg, oral, Daily      Available Vaccination Record:   Immunization History   Administered Date(s) Administered    Flu vaccine (IIV4), preservative free *Check age/dose* 10/06/2015    Flu vaccine, quadrivalent, high-dose, preservative free, age 65y+ (FLUZONE) 04/03/2022, 09/22/2023    Flu vaccine, trivalent, preservative free, HIGH-DOSE, age 65y+ (Fluzone) 10/01/2024    Influenza, Seasonal, Quadrivalent, Adjuvanted 10/13/2020    Influenza, Unspecified 01/25/2013, 12/12/2018    Influenza, seasonal, injectable 09/20/2019    Moderna COVID-19 vaccine, 12 years and older (50mcg/0.5mL)(Spikevax) 10/01/2024    Moderna SARS-CoV-2 Vaccination 03/11/2021, 04/07/2021, 12/14/2021    Pneumococcal " "conjugate vaccine, 13-valent (PREVNAR 13) 09/20/2019    Pneumococcal conjugate vaccine, 20-valent (PREVNAR 20) 09/22/2023    RESPIRATORY SYNCYTIAL VIRUS (RSV), ELIGIBLE PREGNANT PTS, 0.5 ML (ABRYSVO) 10/08/2023    Tdap vaccine, age 7 year and older (BOOSTRIX, ADACEL) 10/01/2024      PHYSICAL EXAM     Vital Signs/Measurements:       1/21/2025     3:04 PM 2/3/2025    10:46 AM 2/5/2025    11:17 AM 2/5/2025    11:48 AM 2/5/2025    11:50 AM 2/5/2025    11:56 AM 2/5/2025    12:10 PM   Vitals   Systolic 101 105 126  111 108 110   Diastolic 77 71 70  58 67 68   BP Location  Left arm        Heart Rate 76 73 73 76 72 68 70   Temp  35.5 °C (95.9 °F) 36.1 °C (97 °F)   36.2 °C (97.2 °F)    Resp 16 17 16 16 16 16 16   Height  1.596 m (5' 2.84\")  1.6 m (5' 3\")       Weight (lb) 237 234.13 233       BMI 41.32 kg/m2 41.69 kg/m2 41.27 kg/m2       BSA (m2) 2.2 m2 2.17 m2 2.17 m2       Visit Report Report Report            Significant value      Performance:   ECOG Performance Status: 2     Grade ECOG performance status   0 Fully active, able to carry on all pre-disease performance without restriction   1 Restricted in physically strenuous activity but ambulatory and able to carry out work of a light or sedentary nature, e.g., light housework, office work   2 Ambulatory and capable of all selfcare but unable to carry out any work activities; Up and about more than 50% of waking hours   3 Capable of only limited selfcare, confined to bed or chair more than 50% of waking hours   4 Completely disabled; Cannot carry out any selfcare; Totally confined to bed or chair   5 Dead     Physical Exam:  General: Patient is pleasant and awake/alert/oriented x3, no distress, nourished, hydrated   Psychological: Intact recent and remote memory, judgement, and insight. Appropriate mood, affect, and behavior     RESULTS/DATA     Labs:   Lab Results   Component Value Date    EPOREFLAB 11 02/03/2025       Lab Results   Component Value Date    WBC 11.8 (H) " 02/03/2025    NEUTROABS 9.16 (H) 02/03/2025    IGABSOL 0.06 02/03/2025    LYMPHSABS 1.83 02/03/2025    MONOSABS 0.61 02/03/2025    EOSABS 0.15 02/03/2025    BASOSABS 0.01 02/03/2025    RBC 5.13 02/03/2025    MCV 94 02/03/2025    MCHC 32.7 02/03/2025    HGB 15.7 02/03/2025    HCT 48.0 (H) 02/03/2025     02/03/2025     Lab Results   Component Value Date    RETICCTPCT 1.8 02/03/2025      Lab Results   Component Value Date    CREATININE 0.77 02/17/2025    BUN 16 02/17/2025    EGFR 83 02/17/2025     02/17/2025    K 4.7 02/17/2025     02/17/2025    CO2 24 02/17/2025      Lab Results   Component Value Date    ALT 9 02/17/2025    AST 10 02/17/2025    ALKPHOS 56 02/17/2025    BILITOT 0.5 02/17/2025      Lab Results   Component Value Date    TSH 0.84 09/01/2023     Lab Results   Component Value Date    TSH 0.84 09/01/2023     Lab Results   Component Value Date    IRON 61 02/03/2025    TIBC 262 02/03/2025    FERRITIN 308 (H) 02/03/2025      Lab Results   Component Value Date    OTUKIRRR93 313 02/03/2025      Lab Results   Component Value Date    FOLATE 7.4 02/03/2025     Lab Results   Component Value Date    SEDRATE 12 02/03/2025      Lab Results   Component Value Date    CRP 0.39 02/03/2025        Lab Results   Component Value Date     02/03/2025     Lab Results   Component Value Date    HAPTOGLOBIN 191 02/03/2025     Lab Results   Component Value Date    SPEP Normal. 02/03/2025        Lab Results   Component Value Date    WBC 11.8 (H) 02/03/2025    NEUTROABS 9.16 (H) 02/03/2025    IGABSOL 0.06 02/03/2025    LYMPHSABS 1.83 02/03/2025    MONOSABS 0.61 02/03/2025    EOSABS 0.15 02/03/2025    BASOSABS 0.01 02/03/2025    RBC 5.13 02/03/2025    MCV 94 02/03/2025    MCHC 32.7 02/03/2025    HGB 15.7 02/03/2025    HCT 48.0 (H) 02/03/2025     02/03/2025     Lab Results   Component Value Date    RETICCTPCT 1.8 02/03/2025      Lab Results   Component Value Date    CREATININE 0.77 02/17/2025    BUN 16  02/17/2025    EGFR 83 02/17/2025     02/17/2025    K 4.7 02/17/2025     02/17/2025    CO2 24 02/17/2025      Lab Results   Component Value Date    ALT 9 02/17/2025    AST 10 02/17/2025    ALKPHOS 56 02/17/2025    BILITOT 0.5 02/17/2025      Lab Results   Component Value Date    TSH 0.84 09/01/2023     Lab Results   Component Value Date    TSH 0.84 09/01/2023     Lab Results   Component Value Date    IRON 61 02/03/2025    TIBC 262 02/03/2025    FERRITIN 308 (H) 02/03/2025      Lab Results   Component Value Date    GEGVAGYQ06 313 02/03/2025      Lab Results   Component Value Date    FOLATE 7.4 02/03/2025     Lab Results   Component Value Date    SEDRATE 12 02/03/2025      Lab Results   Component Value Date    CRP 0.39 02/03/2025     Lab Results   Component Value Date     02/03/2025     Lab Results   Component Value Date    HAPTOGLOBIN 191 02/03/2025     Lab Results   Component Value Date    MYELOIDRES  02/03/2025       DISEASE ASSOCIATED GENOMIC FINDINGS:   TET2 p.S792* ([id number])    INTERPRETATION:  TET2 p.S792*  VAF: 4%  TET2 mutations may indicate clonal hematopoiesis when myelodysplastic syndrome (MDS) is suspected, but they should not be used as presumptive evidence of MDS when other diagnostic criteria for MDS have not been met (NCCN 1.2021). TET2 mutations are frequently seen in CMML (40-60%), MDS/MPN unclassifiable (overlap syndrome), CCUS and CHIP (1.2021). In patients with MDS, TET2 mutations have been associated with a normal karyotype and shown to positively impact the response to hypomethylating agents compared to patients with wild-type TET2 (NCCN 1.2021). In the absence of JAK2, CALR, and MPL mutations, TET2 mutations may serve as a clonal marker included as one of the major diagnostic criteria for primary myelofibrosis (PMF) (NCCN 1.2020).    DISEASE RELEVANT ALTERATIONS NOT DETECTED:   Negative for JAK2 V617F.  Negative for JAK2 exon 12 mutation.  Negative for CALR  mutation.  Negative for MPL mutation.    DISCLAIMER:   This assay is designed to detect targeted clinically-relevant single nucleotide variants and insertions and deletions (<30bp) in a select group of genes. This assay has also been designed to detect specific larger insertions and deletions: FLT3 internal tandem duplication (ITD) and CALR Type I mutations. This assay does not distinguish between somatic and germline alterations in analyzed regions. A negative result (mutation not identified) does not rule out the presence of a mutation below the limit of detection of this assay due to low neoplastic cell content, tumor heterogeneity, or the presence of additional mutations in the listed genes which are outside of the target regions in this assay. Mutations in some homopolymeric regions (eg. ASXL1 p.O017Wlr*12) are not consistently detected by this technology. General population polymorphisms, promoter, synonymous and intronic variants (with the exception of splice variants) are not generally included in this report.    Identification or absence of cancer-associated mutations does not necessarily indicate a response to therapy. Decisions on patient care and treatment must be based on the independent medical judgment of the treating physician, taking into account all applicable information concerning the patients condition such as clinical and histopathologic findings, other laboratory findings, and patient preferences. This report includes information from public sources, including scientific and medical literature to better characterize the significance of alterations detected.    This laboratory developed test was developed and its analytical performance characteristics have been determined by [facility]. This test has not been cleared or approved by the FDA; however, the FDA has determined that such approval is not necessary. The [facility] is certified under the Clinical Laboratory Improvement  Amendments of 1988 (CLIA-88) as qualified to perform high complexity testing.    PANEL GENE LIST:  ASXL1(11-12), BRAF(15), CALR(9), CBL(7-9), CSF3R(14,17), DNMT3A(8-12,18-19,22-23), ETV6(2-8), EZH2(15-19), FLT3(14-16,20), GATA2(4-6), IDH1(4), IDH2(4), JAK2(12,14), JAK3(4,13,16), KIT(17), KRAS(2-4), MPL(4,10), MYD88(5), NPM1(11), NRAS(2-3), PHF6(2-10), PTPN11(3,13), RUNX1(4-9), SETBP1(4), SF3B1(14-16), SRSF2(1), TET2(3-11), TP53(2-11), U2AF1(2,6), WT1(7,9), ZRSR2(1-10).    Not all exons are sequenced in their entirety. Exons covered are shown in parenthesis. Genome assembly (hg19) was used for alignment and variant calling.               Radiology/Studies:   US abdomen complete 2/18/2025      IMPRESSION:  Fatty liver.      Previous cholecystectomy. No biliary dilation.      No hydronephrosis bilaterally.      Mild splenomegaly.      Sub visualization of the pancreas, aorta and IVC due to bowel gas.      ASSESSMENT/PLAN     Assessment and Plan:   #1. Polycythemia   69-year-old  female presents for evaluation of elevated red blood cells.  Referred by primary care group. Per medical review of records polycythemia dates back to June 2023 with hemoglobins as high as 16.7 and hematocrit as high as 51.2.  Leukocytosis dating back to April 2022 with WBCs as high as 18 with immature granulocytes noted most recently since June 2024.  Neutrophilia as high as 13.86 dating back to April 2022.     Extensive medical history including A-fib, coronary arterial sclerosis, acute non-ST elevation myocardial infarction, implant and graft with coronary angioplasty, angina pectoris, mixed hyperlipidemia, hypertension, pulmonary embolism on Eliquis, diabetes mellitus, morbid obesity, thyroid nodule, hyperglycemia, chronic kidney disease and urinary tract infection, abnormal liver enzyme results, endometrial cancer, spondylolithiasis of L4 and L5, arthritis, osteoarthritis, total knee replacement, lumbar stenosis with neurogenic  claudication, COPD, primary insomnia, and syncope.    Hysterectomy 2015 endometrial cancer.   2017 Colonoscopy [facility] reports normal. Removed one polyp. Due now.     Discussed polycythemia primary and secondary causes.  Patient does have COPD.  She denies having sleep apnea or ever being checked.  She has insomnia and does not sleep well and is up all night.  This could be a contributing factor.  The patient is overweight, is attempting to lose weight and eat healthier.  She is more sedentary due to her chronic pain.  She is receiving steroid injections which may contribute to leukocytosis.  We will order a flow cytometry and myeloid malignancy panel.  Assess inflammatory markers, erythropoietin, SPEP, nutritional values.  Advised an ultrasound to evaluate liver and spleen.  Multiple MRIs of spine show stenosis.  We will meet in 3 weeks to review results.  Advised colonoscopy as she is due.    Discussion of workup 02/25/2025:   Patient myeloid NGS shows TET2.S792*which may be seen in MDS.  Clinical correlation with symptoms of extensive bone pain and extreme fatigue.  Per NCCN guidelines we will order a bone marrow biopsy.  B12 and folate on the low end of normal.  Patient started supplement.  She will stop 8 days prior to bone marrow biopsy or longer if recommended.  EPO normal range 11.  Flow cytometry not drawn as originally ordered.  Flow will be assessed on bone marrow.  SPEP negative for M protein.  LDH, haptoglobin and reticulocytes are within normal range.  Bun slightly increased, GFR within normal range.  Liver enzymes are normal.    There is noted large renal cysts on ultrasound.  No hydronephrosis.  Referral to nephrology.  Fatty liver disease is with mild splenomegaly.    **Please follow with specialties as scheduled for other health needs and routine screenings.**    Follow up:    RTC:  -5 weeks to review BMBX    Medications:  -B12 and folic acid     Imaging/Testing:  -BMBX      Referral(s):  -GI due for colonoscopy     Other Pertinent Appointments:    -Primary care 3/24/2025  -Cardiology 6/5/2025  -Nephrology 8/4/2025      Patient Discussion Summary:  Discussed plan of care in detail. Patient states understanding and in agreement to the plan. Answered all questions to there liking. The patient will call with any additional questions and/or concerns.    Thank you for allowing me to participate in your care. It was a pleasure meeting you.    Sincerely,  MODESTO Moctezuma-CNP     Hematology/Oncology Clinical Nurse Practitioner     [facility]   [address]  [address]  [place] [id number]  Office #: [phone]    DISCLAIMER:   In preparing for this visit and writing this note, I reviewed all the previous electronic medical records (testing, labs, imaging, and other procedures, provider notes, and medical charts) of the patient available in the physician portal pertinent to patient care. Significant findings which helped in decision making are recorded in this chart.      This document may have been written by voice recognition software.  There may be some incorrect wording, spelling and/or spelling errors or punctuation errors that were not corrected prior to committing the note to the medical record. Please request clarification if there is documentation error or clarification is needed.   Time based billing: Please see documentation within this specific encounter.

## 2025-02-28 DIAGNOSIS — M48.062 LUMBAR STENOSIS WITH NEUROGENIC CLAUDICATION: ICD-10-CM

## 2025-03-03 RX ORDER — GABAPENTIN 300 MG/1
600 CAPSULE ORAL 3 TIMES DAILY
Qty: 180 CAPSULE | Refills: 3 | OUTPATIENT
Start: 2025-03-03 | End: 2025-07-01

## 2025-03-16 PROCEDURE — RXMED WILLOW AMBULATORY MEDICATION CHARGE

## 2025-03-18 DIAGNOSIS — E11.9 TYPE 2 DIABETES MELLITUS WITHOUT COMPLICATION, WITHOUT LONG-TERM CURRENT USE OF INSULIN (MULTI): ICD-10-CM

## 2025-03-18 DIAGNOSIS — M48.062 LUMBAR STENOSIS WITH NEUROGENIC CLAUDICATION: ICD-10-CM

## 2025-03-18 PROCEDURE — RXMED WILLOW AMBULATORY MEDICATION CHARGE

## 2025-03-18 RX ORDER — INSULIN GLARGINE 100 [IU]/ML
40 INJECTION, SOLUTION SUBCUTANEOUS NIGHTLY
Qty: 15 ML | Refills: 1 | Status: SHIPPED | OUTPATIENT
Start: 2025-03-18 | End: 2025-03-18 | Stop reason: SDUPTHER

## 2025-03-18 RX ORDER — INSULIN GLARGINE 100 [IU]/ML
40 INJECTION, SOLUTION SUBCUTANEOUS NIGHTLY
Qty: 45 ML | Refills: 1 | Status: SHIPPED | OUTPATIENT
Start: 2025-03-18 | End: 2026-03-18

## 2025-03-18 RX ORDER — GABAPENTIN 300 MG/1
600 CAPSULE ORAL 3 TIMES DAILY
Qty: 180 CAPSULE | Refills: 3 | Status: SHIPPED | OUTPATIENT
Start: 2025-03-18 | End: 2025-07-16

## 2025-03-18 NOTE — TELEPHONE ENCOUNTER
Pt called for medication refill Gabapentin 300 mg take two tablet TID to be filled to preferred pharmacy. Last follow up 1/21/25, last refill 10/28/24.

## 2025-03-19 ENCOUNTER — PHARMACY VISIT (OUTPATIENT)
Dept: PHARMACY | Facility: CLINIC | Age: 70
End: 2025-03-19
Payer: MEDICARE

## 2025-03-24 ENCOUNTER — HOSPITAL ENCOUNTER (OUTPATIENT)
Dept: RADIOLOGY | Facility: HOSPITAL | Age: 70
Discharge: HOME | End: 2025-03-24
Payer: MEDICARE

## 2025-03-24 ENCOUNTER — APPOINTMENT (OUTPATIENT)
Dept: PRIMARY CARE | Facility: CLINIC | Age: 70
End: 2025-03-24
Payer: MEDICARE

## 2025-03-24 VITALS
RESPIRATION RATE: 18 BRPM | TEMPERATURE: 96.4 F | HEART RATE: 60 BPM | DIASTOLIC BLOOD PRESSURE: 83 MMHG | OXYGEN SATURATION: 97 % | SYSTOLIC BLOOD PRESSURE: 119 MMHG

## 2025-03-24 DIAGNOSIS — Z15.89 GENE MUTATION: ICD-10-CM

## 2025-03-24 DIAGNOSIS — R53.83 OTHER FATIGUE: ICD-10-CM

## 2025-03-24 DIAGNOSIS — M89.8X9 BONE PAIN: ICD-10-CM

## 2025-03-24 DIAGNOSIS — D75.1 POLYCYTHEMIA: ICD-10-CM

## 2025-03-24 LAB
BASOPHILS # BLD AUTO: 0.05 X10*3/UL (ref 0–0.1)
BASOPHILS NFR BLD AUTO: 0.4 %
EOSINOPHIL # BLD AUTO: 0.29 X10*3/UL (ref 0–0.7)
EOSINOPHIL NFR BLD AUTO: 2.5 %
ERYTHROCYTE [DISTWIDTH] IN BLOOD BY AUTOMATED COUNT: 13.2 % (ref 11.5–14.5)
HCT VFR BLD AUTO: 49.2 % (ref 36–46)
HGB BLD-MCNC: 16.5 G/DL (ref 12–16)
IMM GRANULOCYTES # BLD AUTO: 0.08 X10*3/UL (ref 0–0.7)
IMM GRANULOCYTES NFR BLD AUTO: 0.7 % (ref 0–0.9)
LYMPHOCYTES # BLD AUTO: 2.26 X10*3/UL (ref 1.2–4.8)
LYMPHOCYTES NFR BLD AUTO: 19.1 %
MCH RBC QN AUTO: 31 PG (ref 26–34)
MCHC RBC AUTO-ENTMCNC: 33.5 G/DL (ref 32–36)
MCV RBC AUTO: 93 FL (ref 80–100)
MONOCYTES # BLD AUTO: 0.86 X10*3/UL (ref 0.1–1)
MONOCYTES NFR BLD AUTO: 7.3 %
NEUTROPHILS # BLD AUTO: 8.27 X10*3/UL (ref 1.2–7.7)
NEUTROPHILS NFR BLD AUTO: 70 %
NRBC BLD-RTO: 0 /100 WBCS (ref 0–0)
PLATELET # BLD AUTO: 224 X10*3/UL (ref 150–450)
RBC # BLD AUTO: 5.32 X10*6/UL (ref 4–5.2)
WBC # BLD AUTO: 11.8 X10*3/UL (ref 4.4–11.3)

## 2025-03-24 PROCEDURE — 85025 COMPLETE CBC W/AUTO DIFF WBC: CPT

## 2025-03-24 PROCEDURE — 2500000004 HC RX 250 GENERAL PHARMACY W/ HCPCS (ALT 636 FOR OP/ED): Performed by: RADIOLOGY

## 2025-03-24 PROCEDURE — 36415 COLL VENOUS BLD VENIPUNCTURE: CPT

## 2025-03-24 PROCEDURE — 77012 CT SCAN FOR NEEDLE BIOPSY: CPT

## 2025-03-24 PROCEDURE — 88185 FLOWCYTOMETRY/TC ADD-ON: CPT | Mod: TC,GEALAB

## 2025-03-24 PROCEDURE — 7100000010 HC PHASE TWO TIME - EACH INCREMENTAL 1 MINUTE

## 2025-03-24 PROCEDURE — 85097 BONE MARROW INTERPRETATION: CPT | Mod: GEALAB

## 2025-03-24 PROCEDURE — 2720000007 HC OR 272 NO HCPCS

## 2025-03-24 PROCEDURE — 7100000009 HC PHASE TWO TIME - INITIAL BASE CHARGE

## 2025-03-24 RX ORDER — MIDAZOLAM HYDROCHLORIDE 1 MG/ML
INJECTION INTRAMUSCULAR; INTRAVENOUS
Status: COMPLETED | OUTPATIENT
Start: 2025-03-24 | End: 2025-03-24

## 2025-03-24 RX ORDER — FENTANYL CITRATE 50 UG/ML
INJECTION, SOLUTION INTRAMUSCULAR; INTRAVENOUS
Status: COMPLETED | OUTPATIENT
Start: 2025-03-24 | End: 2025-03-24

## 2025-03-24 RX ADMIN — FENTANYL CITRATE 100 MCG: 50 INJECTION, SOLUTION INTRAMUSCULAR; INTRAVENOUS at 13:10

## 2025-03-24 RX ADMIN — MIDAZOLAM HYDROCHLORIDE 1 MG: 1 INJECTION, SOLUTION INTRAMUSCULAR; INTRAVENOUS at 13:10

## 2025-03-24 ASSESSMENT — PAIN SCALES - GENERAL: PAINLEVEL_OUTOF10: 0 - NO PAIN

## 2025-03-24 ASSESSMENT — PAIN - FUNCTIONAL ASSESSMENT: PAIN_FUNCTIONAL_ASSESSMENT: 0-10

## 2025-03-24 NOTE — DISCHARGE INSTRUCTIONS
Okay to remove band-aid tomorrow  Okay to shower tomorrow  Okay to use ice as needed for pain  Okay to take tylenol as needed for pain  No driving for 24 hours  No drinking alcohol for 24 hours   No making any life changing decisions for 24 hours  Follow up with Vickie Gupta in 2-4 weeks for results  Notify MD with any s/s of infection or active bleeding

## 2025-03-25 ASSESSMENT — ENCOUNTER SYMPTOMS
SPEECH DIFFICULTY: 0
TREMORS: 0
SEIZURES: 0
NERVOUS/ANXIOUS: 0
SWEATS: 0
POLYPHAGIA: 0
HUNGER: 0
VISUAL CHANGE: 0
FATIGUE: 0
CONFUSION: 0
POLYDIPSIA: 0
DIZZINESS: 0
HEADACHES: 0
BLURRED VISION: 0
BLACKOUTS: 0

## 2025-03-26 ENCOUNTER — APPOINTMENT (OUTPATIENT)
Dept: PRIMARY CARE | Facility: CLINIC | Age: 70
End: 2025-03-26
Payer: MEDICARE

## 2025-03-26 VITALS — WEIGHT: 228 LBS | BODY MASS INDEX: 40.4 KG/M2 | HEIGHT: 63 IN

## 2025-03-26 DIAGNOSIS — E11.9 TYPE 2 DIABETES MELLITUS WITHOUT COMPLICATION, WITHOUT LONG-TERM CURRENT USE OF INSULIN: ICD-10-CM

## 2025-03-26 LAB
CELL COUNT (BLOOD): 33.56 X10*3/UL
CELL POPULATIONS: NORMAL
DIAGNOSIS: NORMAL
FLOW DIFFERENTIAL: NORMAL
FLOW TEST ORDERED: NORMAL
LAB TEST METHOD: NORMAL
NUMBER OF CELLS COLLECTED: NORMAL
PATH REPORT.TOTAL CANCER: NORMAL
SIGNATURE COMMENT: NORMAL
SPECIMEN VIABILITY: NORMAL

## 2025-03-26 PROCEDURE — RXMED WILLOW AMBULATORY MEDICATION CHARGE

## 2025-03-26 RX ORDER — INSULIN GLARGINE 100 [IU]/ML
10 INJECTION, SOLUTION SUBCUTANEOUS NIGHTLY
Qty: 15 ML | Refills: 1 | Status: SHIPPED | OUTPATIENT
Start: 2025-03-26 | End: 2026-01-20

## 2025-03-26 RX ORDER — TIRZEPATIDE 10 MG/.5ML
10 INJECTION, SOLUTION SUBCUTANEOUS
Qty: 6 ML | Refills: 1 | Status: SHIPPED | OUTPATIENT
Start: 2025-03-26 | End: 2025-09-10

## 2025-03-26 NOTE — PROGRESS NOTES
"DM FOLLOW UP  E11.9 - Type 2 diabetes    Katya Kaplan \"Mariam\" is a 69 y.o. female here today at the request of Referring Provider: Trupit Greene MD for my opinion regarding diabetes management.  My final recommendations will be communicated back to the requesting provider by way of shared medical record.     Patient here today for follow up re: T2DM.  Pt has been using weekly GLP-1.  She reports nocturnal hypoglycemia over the past week or so.  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      Patient is approved for [facility]     Subjective   Past Medical History:  She has a past medical history of Acute non-ST elevation myocardial infarction (NSTEMI) (Multi) (05/19/2024), Angina pectoris (10/11/2023), Arthritis, Atrial fibrillation (Multi) (10/11/2023), Benign essential hypertension (09/04/2019), Chest discomfort (10/11/2023), Chronic obstructive pulmonary disease (Multi) (12/30/2018), CKD (chronic kidney disease) (09/09/2022), Coronary arteriosclerosis (10/11/2023), Edema (10/11/2023), Electrocardiogram abnormal (10/11/2023), Endometrial cancer (Multi) (2015), HTN (hypertension) (12/30/2018), Mixed hyperlipidemia (12/30/2018), PE (pulmonary thromboembolism) (Multi) (10/20/2021), Presence of coronary angioplasty implant and graft (04/08/2022), and Type 2 diabetes mellitus without complications (Multi) (12/11/2018).    Social History:  She reports that she quit smoking about 27 years ago. Her smoking use included cigarettes. She has never been exposed to tobacco smoke. She has never used smokeless tobacco. She reports that she does not drink alcohol and does not use drugs.    Allergies:  Penicillins    CURRENT PHARMACOTHERAPY   Current Outpatient Medications   Medication Instructions    albuterol 2.5 mg /3 mL (0.083 %) nebulizer solution Use 1 vial (2.5 mg) by nebulization every 6 hours if needed for wheezing.    albuterol 90 mcg/actuation inhaler " "INHALE 2 PUFFS BY MOUTH FOUR TIMES A DAY AS NEEDED FOR WHEEZING    apixaban (Eliquis) 5 mg tablet TAKE 1 TABLET BY MOUTH TWO TIMES A DAY    blood-glucose meter,continuous (FreeStyle Vernell 3 [place]) [facility] Use as instructed    blood-glucose sensor (FreeStyle Vernell 3 Sensor) device Change sensor every 15 days as directed - Please dispense Vernell 3 plus sensors    ezetimibe (ZETIA) 10 mg, oral, Daily    fluticasone furoate-vilanteroL (Breo Ellipta) 200-25 mcg/dose inhaler INHALE 1 PUFF BY MOUTH ONE TIME DAILY    gabapentin (NEURONTIN) 600 mg, oral, 3 times daily    Lantus Solostar U-100 Insulin 40 Units, subcutaneous, Nightly    metoprolol succinate XL (TOPROL-XL) 50 mg, oral, Daily    Mounjaro 7.5 mg, subcutaneous, Every 7 days    omeprazole (PRILOSEC) 40 mg, oral, Daily    pen needle, diabetic 32 gauge x 5/32\" needle Use daily with insulin injection.    ranolazine (Ranexa) 500 mg 12 hr tablet TAKE 1 TABLET BY MOUTH TWO TIMES A DAY    Repatha SureClick 140 mg, subcutaneous, Every 14 days    spironolactone (ALDACTONE) 25 mg, oral, Daily     Pt denies SE/intolerances  Reviewed all medications by prescribing practitioner (such as prescriptions, OTCs, herbal therapies, supplements) and documented in the medical record.     Reviewed need for secondary prevention: Statins, ACE-I/ARB, Aspirin    Glucose Monitoring  Patient is using CGM, Vernell.  Report reviewed with patient.  See attached documents.  Name: Katya Kaplan  YOB: 1955  Report Period: 03/13/2025 - 03/26/2025 (14 days)  Generated: 03/26/2025  Time CGM Active: 83%    Glucose Statistics and Targets  Average Glucose: 101 mg/dL  Glucose Management Indicator (GMI): 5.7%  Glucose Variability (%CV): 17.0%  Target Range: 70 - 180 mg/dL    Time in Ranges  Very High: >250 mg/dL --- 0%  High: 181 - 250 mg/dL --- 0%  Target Range: 70 - 180 mg/dL --- 100%  Low: 54 - 69 mg/dL --- 0%  Very Low: <54 mg/dL --- 0%      Lifestyle:  Current diet: improving, trying to eat " smaller portions and improving, trying to limit CHO foods  Current exercise: no regular exercise due to mobility issues and back pain     Last Labs/Vitals/Meds  HEMOGLOBIN A1c (% of total Hgb)   Date Value   02/17/2025 5.3     Hemoglobin A1C (%)   Date Value   11/01/2024 5.2   08/13/2024 6.7 (H)   09/01/2023 8.6 (H)   04/01/2022 6.2 (A)   11/10/2021 7.8 (H)   10/11/2021 8.7 (H)   01/27/2020 7.2 (H)   09/13/2019 6.8 (H)     GLUCOSE (mg/dL)   Date Value   02/17/2025 110 (H)     CREATININE (mg/dL)   Date Value   02/17/2025 0.77     EGFR (mL/min/1.73m2)   Date Value   02/17/2025 83       BP Readings from Last 3 Encounters:   03/24/25 119/83   02/05/25 110/68   02/03/25 105/71        Wt Readings from Last 6 Encounters:   02/05/25 106 kg (233 lb)   02/03/25 106 kg (234 lb 2.1 oz)   01/21/25 108 kg (237 lb)   01/16/25 108 kg (237 lb)   12/19/24 108 kg (238 lb)   11/19/24 112 kg (246 lb)     BMI Readings from Last 6 Encounters:   02/05/25 41.27 kg/m²   02/03/25 41.69 kg/m²   01/21/25 41.32 kg/m²   01/16/25 41.32 kg/m²   12/19/24 41.50 kg/m²   11/19/24 42.89 kg/m²       Assessment:  Patients diabetes is improved with most recent A1c of 5.3% (Goal < 7%).  Compliance at present is estimated to be good  Discussed increase dose, weekly GLP-1 for improved glycemic control and reduction in insulin dose.  Goal being eventual discontinuance of insulin.  Pt is agreeable.    Discussed reduce Lantus from 12 units daily to now 10 units daily.       Plan:  1.  START Mounjaro 10mg once you are finished with 7.5mg doses  2.  Reduce Lantus to 10 units daily   3.  Continue all other medications at current dosages  4.  Follow up in 3 months with clinical pharmacist       Data reviewed and evaluated by DONOVAN Raza RPH, [facility]  Treatment and plan changes discussed with Trupti Greene MD

## 2025-03-27 LAB
PATH REPORT.COMMENTS IMP SPEC: NORMAL
PATH REPORT.FINAL DX SPEC: NORMAL
PATH REPORT.GROSS SPEC: NORMAL
PATH REPORT.MICROSCOPIC SPEC OTHER STN: NORMAL
PATH REPORT.RELEVANT HX SPEC: NORMAL
PATH REPORT.TOTAL CANCER: NORMAL

## 2025-03-28 ENCOUNTER — PHARMACY VISIT (OUTPATIENT)
Dept: PHARMACY | Facility: CLINIC | Age: 70
End: 2025-03-28
Payer: MEDICARE

## 2025-04-01 ENCOUNTER — LAB (OUTPATIENT)
Dept: LAB | Facility: HOSPITAL | Age: 70
End: 2025-04-01
Payer: MEDICARE

## 2025-04-01 ENCOUNTER — TELEMEDICINE (OUTPATIENT)
Dept: HEMATOLOGY/ONCOLOGY | Facility: CLINIC | Age: 70
End: 2025-04-01
Payer: MEDICARE

## 2025-04-01 DIAGNOSIS — R23.3 ABNORMAL BRUISING: Primary | ICD-10-CM

## 2025-04-01 DIAGNOSIS — E11.9 TYPE 2 DIABETES MELLITUS WITHOUT COMPLICATION, WITH LONG-TERM CURRENT USE OF INSULIN: ICD-10-CM

## 2025-04-01 DIAGNOSIS — D75.1 POLYCYTHEMIA: ICD-10-CM

## 2025-04-01 DIAGNOSIS — Z15.89 GENE MUTATION: ICD-10-CM

## 2025-04-01 DIAGNOSIS — R53.83 OTHER FATIGUE: ICD-10-CM

## 2025-04-01 DIAGNOSIS — D75.89 OTHER SPECIFIED DISEASES OF BLOOD AND BLOOD-FORMING ORGANS: ICD-10-CM

## 2025-04-01 DIAGNOSIS — Z79.4 TYPE 2 DIABETES MELLITUS WITHOUT COMPLICATION, WITH LONG-TERM CURRENT USE OF INSULIN: ICD-10-CM

## 2025-04-01 DIAGNOSIS — E78.2 MIXED HYPERLIPIDEMIA: ICD-10-CM

## 2025-04-01 DIAGNOSIS — D47.1 CHRONIC MYELOPROLIFERATIVE DISEASE (MULTI): ICD-10-CM

## 2025-04-01 DIAGNOSIS — M89.8X9 BONE PAIN: ICD-10-CM

## 2025-04-01 DIAGNOSIS — I10 BENIGN ESSENTIAL HYPERTENSION: ICD-10-CM

## 2025-04-01 DIAGNOSIS — D46.9 MDS (MYELODYSPLASTIC SYNDROME) (MULTI): ICD-10-CM

## 2025-04-01 LAB
ALBUMIN SERPL BCP-MCNC: 3.6 G/DL (ref 3.4–5)
ALP SERPL-CCNC: 46 U/L (ref 33–136)
ALT SERPL W P-5'-P-CCNC: 9 U/L (ref 7–45)
ANION GAP SERPL CALC-SCNC: 11 MMOL/L (ref 10–20)
AST SERPL W P-5'-P-CCNC: 9 U/L (ref 9–39)
BILIRUB SERPL-MCNC: 0.6 MG/DL (ref 0–1.2)
BUN SERPL-MCNC: 19 MG/DL (ref 6–23)
CALCIUM SERPL-MCNC: 8.6 MG/DL (ref 8.6–10.3)
CHLORIDE SERPL-SCNC: 107 MMOL/L (ref 98–107)
CHOLEST SERPL-MCNC: 92 MG/DL (ref 0–199)
CHOLESTEROL/HDL RATIO: 3.2
CO2 SERPL-SCNC: 24 MMOL/L (ref 21–32)
CREAT SERPL-MCNC: 0.69 MG/DL (ref 0.5–1.05)
EGFRCR SERPLBLD CKD-EPI 2021: >90 ML/MIN/1.73M*2
GLUCOSE SERPL-MCNC: 103 MG/DL (ref 74–99)
HDLC SERPL-MCNC: 28.9 MG/DL
LDLC SERPL CALC-MCNC: 37 MG/DL
NON HDL CHOLESTEROL: 63 MG/DL (ref 0–149)
POTASSIUM SERPL-SCNC: 4 MMOL/L (ref 3.5–5.3)
PROT SERPL-MCNC: 5.6 G/DL (ref 6.4–8.2)
SODIUM SERPL-SCNC: 138 MMOL/L (ref 136–145)
TEST COMMENT - SURGICAL SENDOUT REQUEST: NORMAL
TRIGL SERPL-MCNC: 132 MG/DL (ref 0–149)
VLDL: 26 MG/DL (ref 0–40)

## 2025-04-01 PROCEDURE — 83036 HEMOGLOBIN GLYCOSYLATED A1C: CPT | Mod: GEALAB

## 2025-04-01 PROCEDURE — 3048F LDL-C <100 MG/DL: CPT

## 2025-04-01 PROCEDURE — 3044F HG A1C LEVEL LT 7.0%: CPT

## 2025-04-01 PROCEDURE — 99214 OFFICE O/P EST MOD 30 MIN: CPT

## 2025-04-01 PROCEDURE — 36415 COLL VENOUS BLD VENIPUNCTURE: CPT

## 2025-04-01 PROCEDURE — 81270 JAK2 GENE: CPT

## 2025-04-01 PROCEDURE — 80053 COMPREHEN METABOLIC PANEL: CPT

## 2025-04-01 PROCEDURE — 1123F ACP DISCUSS/DSCN MKR DOCD: CPT

## 2025-04-01 PROCEDURE — 80061 LIPID PANEL: CPT

## 2025-04-01 NOTE — PROGRESS NOTES
[facility] [place]    PATIENT VISIT INFORMATION    Visit Type: Follow up Visit  I performed this visit using real-time telehealth tools, including an audio/video connection between (Katya Kaplan at home) and (TRISTA Moctezuma at [facility])  Patient consents to telemedicine service today and understands the limitations of the visit, no physical examination and all issues may not be able to be addressed today, other than brief neuro and psych assessment.   Referring Provider: TRISTA Lakhani  Reason for referral: Elevated red blood cells  Primary Practice Provider: Trupti Greene MD    CANCER/HEMATOLGOY HISTORY    69-year-old  female presents for evaluation of elevated red blood cells.  Referred by primary care group. Per medical review of records polycythemia dates back to June 2023 with hemoglobins as high as 16.7 and hematocrit as high as 51.2.  Leukocytosis dating back to April 2022 with WBCs as high as 18 with immature granulocytes noted most recently since June 2024.  Neutrophilia as high as 13.86 dating back to April 2022.     Extensive medical history including A-fib, coronary arterial sclerosis, acute non-ST elevation myocardial infarction, implant and graft with coronary angioplasty, angina pectoris, mixed hyperlipidemia, hypertension, pulmonary embolism on Eliquis, diabetes mellitus, morbid obesity, thyroid nodule, hyperglycemia, chronic kidney disease and urinary tract infection, abnormal liver enzyme results, endometrial cancer, spondylolithiasis of L4 and L5, arthritis, osteoarthritis, total knee replacement, lumbar stenosis with neurogenic claudication, COPD, primary insomnia, and syncope.    Hysterectomy 2015 endometrial cancer.   2017 Colonoscopy [facility] reports normal. Removed one polyp. Due now.   Receives steroid injections in her back and her knees every 3 months throughout.  Follows pain management.    Bone marrow Biopsy  "3/24/2025     FINAL DIAGNOSIS   A, B & C. BONE MARROW CLOT WITH ASPIRATE AND CORE WITH TOUCH PREP, LEFT ILIAC CREST:    -- MILD HYPERCELLULAR BONE MARROW (50%) WITH MATURING TRILINEAGE HEMATOPOIESIS   -- NO INCREASE IN BLASTS     Comment: Elevated hemoglobin and red blood cells from June 2023 are noted. Molecular test detected TET2 with low VAF (4%), which is likely due to clonal hematopoiesis.  Previous molecular test showed no JAK2 V617 mutation but diagnosis of polycythemia vera cannot be completely ruled out given the fact that JAK2 atypical mutation could exist. A larger next generation sequence (NGS) panel may be more informative.     Bone Marrow Differential    Cell Type Value Reference Range   Promyelocytes 2.5 1-5 %   Myelocytes 13.5 5-10 %   Metamyelocytes 2.5 10-25 %   Bands 11.0 10-20 %   Segmented Neutrophils 37.0 5-30 %   Eosinophils 2.5 2-4 %   Basophils 0.0 0-1 %           Lymphocytes 4.5 5-25 %   Monocytes 2.0 0-2 %   Plasma Cells 1.0 0-2 %           Blasts 0.0 0-1 %           Total Erythroid 23.5 17-35 %           Total Cells Counted 200     Myeloid/Erythroid Ratio 2.9 1.5-4.1       FLOW:  Diagnosis   Bone marrow, flow cytometry:  - No increase or abnormal blast identified.  - No clonal B cells or abnormal T cells detected.        HISTORY OF PRESENT ILLNESS     ID Statement: Katya Kaplan is a 69 year old female    Chief Complaint: \"No energy and has fatigue, bruising easy, extreme pain in bones and unable to walk\"     Interval History:   Patient presents for evaluation of BMBX. Unable to walk due to pain the last 2 years. Abnormal bruising.  Does not sleep well. Takes gabapentin. Neuropathy in legs at night.  Diagnosis of diabetes.  RLS. Spinal stenosis. Walk with walker at home. WC for long distances. Tired during day from insomnia and naps during the day.  She stays up all night and sleeps during the day.       Trying to lose weight and become healthier.  States she has no stamina.  No energy and " is fatigued.  History of pulmonary embolism and DVT following knee replacement surgery.  Remains on Eliquis.    Appetite okay, on Mounjaro since August 2024. Intentional weight loss 65 pounds. Bruising easy, on anticoagulation. Stent placed in April 2024. Taken off Plavix 6 months ago.    HX copd, sometimes cough.     Denies F/C/recent illness/infection, drenching night sweats, unintentional weight loss, anorexia/loss of appetite, HA, dizziness, lightheadedness, acute changes in vision/hearing, palpitations, CP, SOB, n/v/d/c/abd pain, bleeding, melena, urinary issues, haematuria, LAD, bone pain, sores, or rashes.    No specific bone pain other than arthritis pain.  She also has chronic back pain from stenosis.    PAST/CURRENT HISTORY     MEDICAL/SURGICAL HISTORY  Past Medical History:   Diagnosis Date    Acute non-ST elevation myocardial infarction (NSTEMI) (Multi) 05/19/2024    Angina pectoris 10/11/2023    Arthritis     Atrial fibrillation (Multi) 10/11/2023    Benign essential hypertension 09/04/2019    Chest discomfort 10/11/2023    Chronic obstructive pulmonary disease (Multi) 12/30/2018    CKD (chronic kidney disease) 09/09/2022    Coronary arteriosclerosis 10/11/2023    Edema 10/11/2023    Electrocardiogram abnormal 10/11/2023    Endometrial cancer (Multi) 2015    HTN (hypertension) 12/30/2018    Mixed hyperlipidemia 12/30/2018    PE (pulmonary thromboembolism) (Multi) 10/20/2021    Presence of coronary angioplasty implant and graft 04/08/2022    Type 2 diabetes mellitus without complications (Multi) 12/11/2018      Past Surgical History:   Procedure Laterality Date    CARDIAC CATHETERIZATION  04/2022    CHOLECYSTECTOMY  12/2016    CORONARY STENT PLACEMENT  04/22    FL GUIDED ASPIRATION OR INJECTION LARGE JOINT LEFT Left 12/18/2023    FL GUIDED ASPIRATION OR INJECTION LARGE JOINT LEFT 12/18/2023 Ralph Triplett MD [facility]    FL GUIDED ASPIRATION OR INJECTION LARGE JOINT RIGHT Right 12/14/2023    FL  GUIDED ASPIRATION OR INJECTION LARGE JOINT RIGHT 2023 Ralph Triplett MD [facility]    HYSTERECTOMY  2015    JOINT REPLACEMENT  10/2010        SOCIAL HISTORY  -Lives with  Erickson (2 adult sons with grandchildren)  -Work place: Retired   -Tobacco/smokeless use: Quit  (15 years pack per day)  -Alcohol: Denies   -Illicit drug or marijuana use: Denies   -Sikhism or Spiritual beliefs: Worship   -Social Determinates of Health Concerns: NA    FAMILY HISTORY  -Sister uterine caner  -Heart disease in paternal and maternal family   -Maternal Aunt uterine caner in age 40's  -Maternal Aunt breast  in age 40's  -Paternal Grandmother  at age 52 years old leukemia   -No other known history of hematologic, bleeding, clotting, autoimmune, genetic, or malignant disorders in the family.     OCCUPATIONAL/ENVIRONMENTAL HISTORY/EXPOSURES:  -[employer] 44 years    Active Problems, Allergy List, Medication List, and PMH/PSH/FH/Social Hx have been reviewed and reconciled in chart. Updates made when necessary.     REVIEW OF SYSTEMS   A review of systems has been completed and are negative for complaints except what is stated in the assessment, HPI, IH, ROS, and/or past medical history.    ALLERGIES AND MEDICATIONS     Allergies and Intolerances:   Allergies   Allergen Reactions    Penicillins Swelling      Medication Profile:   Current Outpatient Medications   Medication Instructions    albuterol 2.5 mg /3 mL (0.083 %) nebulizer solution Use 1 vial (2.5 mg) by nebulization every 6 hours if needed for wheezing.    albuterol 90 mcg/actuation inhaler INHALE 2 PUFFS BY MOUTH FOUR TIMES A DAY AS NEEDED FOR WHEEZING    apixaban (Eliquis) 5 mg tablet TAKE 1 TABLET BY MOUTH TWO TIMES A DAY    blood-glucose sensor (FreeStyle Vernell 3 Sensor) device Change sensor every 15 days as directed - Please dispense Vernell 3 plus sensors    ezetimibe (ZETIA) 10 mg, oral, Daily    fluticasone furoate-vilanteroL (Breo Ellipta)  "200-25 mcg/dose inhaler INHALE 1 PUFF BY MOUTH ONE TIME DAILY    gabapentin (NEURONTIN) 600 mg, oral, 3 times daily    Lantus Solostar U-100 Insulin 10 Units, subcutaneous, Nightly, Discard pen 28 days after use.    metoprolol succinate XL (TOPROL-XL) 50 mg, oral, Daily    Mounjaro 10 mg, subcutaneous, Every 7 days    omeprazole (PRILOSEC) 40 mg, oral, Daily    pen needle, diabetic 32 gauge x 5/32\" needle Use daily with insulin injection.    ranolazine (Ranexa) 500 mg 12 hr tablet TAKE 1 TABLET BY MOUTH TWO TIMES A DAY    Repatha SureClick 140 mg, subcutaneous, Every 14 days    spironolactone (ALDACTONE) 25 mg, oral, Daily      Available Vaccination Record:   Immunization History   Administered Date(s) Administered    Flu vaccine (IIV4), preservative free *Check age/dose* 10/06/2015    Flu vaccine, quadrivalent, high-dose, preservative free, age 65y+ (FLUZONE) 04/03/2022, 09/22/2023    Flu vaccine, trivalent, preservative free, HIGH-DOSE, age 65y+ (Fluzone) 10/01/2024    Influenza, Seasonal, Quadrivalent, Adjuvanted 10/13/2020    Influenza, Unspecified 01/25/2013, 12/12/2018    Influenza, seasonal, injectable 09/20/2019    Moderna COVID-19 vaccine, 12 years and older (50mcg/0.5mL)(Spikevax) 10/01/2024    Moderna SARS-CoV-2 Vaccination 03/11/2021, 04/07/2021, 12/14/2021    Pneumococcal conjugate vaccine, 13-valent (PREVNAR 13) 09/20/2019    Pneumococcal conjugate vaccine, 20-valent (PREVNAR 20) 09/22/2023    RESPIRATORY SYNCYTIAL VIRUS (RSV), ELIGIBLE PREGNANT PTS, 0.5 ML (ABRYSVO) 10/08/2023    Tdap vaccine, age 7 year and older (BOOSTRIX, ADACEL) 10/01/2024      PHYSICAL EXAM     Vital Signs/Measurements:       2/5/2025    12:10 PM 3/24/2025    12:16 PM 3/24/2025     1:10 PM 3/24/2025     1:16 PM 3/24/2025     1:18 PM 3/24/2025     1:25 PM 3/26/2025     1:00 PM   Vitals   Systolic 110  145 116 116 119    Diastolic 68  109 73 73 83    Heart Rate 70 68 71 72 73 60    Temp  35.8 °C (96.4 °F)        Resp 16 18 18 18 18 " "18    Height       1.6 m (5' 2.99\")   Weight (lb)       228   BMI       40.4 kg/m2   BSA (m2)       2.14 m2      Performance:   ECOG Performance Status: 2     Grade ECOG performance status   0 Fully active, able to carry on all pre-disease performance without restriction   1 Restricted in physically strenuous activity but ambulatory and able to carry out work of a light or sedentary nature, e.g., light housework, office work   2 Ambulatory and capable of all selfcare but unable to carry out any work activities; Up and about more than 50% of waking hours   3 Capable of only limited selfcare, confined to bed or chair more than 50% of waking hours   4 Completely disabled; Cannot carry out any selfcare; Totally confined to bed or chair   5 Dead     Physical Exam:  General: Patient is pleasant and awake/alert/oriented x3, no distress, nourished, hydrated   Psychological: Intact recent and remote memory, judgement, and insight. Appropriate mood, affect, and behavior     RESULTS/DATA     Labs:   Lab Results   Component Value Date    EPOREFLAB 11 02/03/2025       Lab Results   Component Value Date    WBC 11.8 (H) 03/24/2025    NEUTROABS 8.27 (H) 03/24/2025    IGABSOL 0.08 03/24/2025    LYMPHSABS 2.26 03/24/2025    MONOSABS 0.86 03/24/2025    EOSABS 0.29 03/24/2025    BASOSABS 0.05 03/24/2025    RBC 5.32 (H) 03/24/2025    MCV 93 03/24/2025    MCHC 33.5 03/24/2025    HGB 16.5 (H) 03/24/2025    HCT 49.2 (H) 03/24/2025     03/24/2025     Lab Results   Component Value Date    RETICCTPCT 1.8 02/03/2025      Lab Results   Component Value Date    CREATININE 0.77 02/17/2025    BUN 16 02/17/2025    EGFR 83 02/17/2025     02/17/2025    K 4.7 02/17/2025     02/17/2025    CO2 24 02/17/2025      Lab Results   Component Value Date    ALT 9 02/17/2025    AST 10 02/17/2025    ALKPHOS 56 02/17/2025    BILITOT 0.5 02/17/2025      Lab Results   Component Value Date    TSH 0.84 09/01/2023     Lab Results   Component Value " Date    TSH 0.84 09/01/2023     Lab Results   Component Value Date    IRON 61 02/03/2025    TIBC 262 02/03/2025    FERRITIN 308 (H) 02/03/2025      Lab Results   Component Value Date    URJSHDQV52 313 02/03/2025      Lab Results   Component Value Date    FOLATE 7.4 02/03/2025     Lab Results   Component Value Date    SEDRATE 12 02/03/2025      Lab Results   Component Value Date    CRP 0.39 02/03/2025        Lab Results   Component Value Date     02/03/2025     Lab Results   Component Value Date    HAPTOGLOBIN 191 02/03/2025     Lab Results   Component Value Date    SPEP Normal. 02/03/2025        Lab Results   Component Value Date    WBC 11.8 (H) 03/24/2025    NEUTROABS 8.27 (H) 03/24/2025    IGABSOL 0.08 03/24/2025    LYMPHSABS 2.26 03/24/2025    MONOSABS 0.86 03/24/2025    EOSABS 0.29 03/24/2025    BASOSABS 0.05 03/24/2025    RBC 5.32 (H) 03/24/2025    MCV 93 03/24/2025    MCHC 33.5 03/24/2025    HGB 16.5 (H) 03/24/2025    HCT 49.2 (H) 03/24/2025     03/24/2025     Lab Results   Component Value Date    RETICCTPCT 1.8 02/03/2025      Lab Results   Component Value Date    CREATININE 0.77 02/17/2025    BUN 16 02/17/2025    EGFR 83 02/17/2025     02/17/2025    K 4.7 02/17/2025     02/17/2025    CO2 24 02/17/2025      Lab Results   Component Value Date    ALT 9 02/17/2025    AST 10 02/17/2025    ALKPHOS 56 02/17/2025    BILITOT 0.5 02/17/2025      Lab Results   Component Value Date    TSH 0.84 09/01/2023     Lab Results   Component Value Date    TSH 0.84 09/01/2023     Lab Results   Component Value Date    IRON 61 02/03/2025    TIBC 262 02/03/2025    FERRITIN 308 (H) 02/03/2025      Lab Results   Component Value Date    LRCPKYXB50 313 02/03/2025      Lab Results   Component Value Date    FOLATE 7.4 02/03/2025     Lab Results   Component Value Date    SEDRATE 12 02/03/2025      Lab Results   Component Value Date    CRP 0.39 02/03/2025     Lab Results   Component Value Date     02/03/2025      Lab Results   Component Value Date    HAPTOGLOBIN 191 02/03/2025     Lab Results   Component Value Date    MYELOIDRES  02/03/2025       DISEASE ASSOCIATED GENOMIC FINDINGS:   TET2 p.S792* ([id number])    INTERPRETATION:  TET2 p.S792*  VAF: 4%  TET2 mutations may indicate clonal hematopoiesis when myelodysplastic syndrome (MDS) is suspected, but they should not be used as presumptive evidence of MDS when other diagnostic criteria for MDS have not been met (NCCN 1.2021). TET2 mutations are frequently seen in CMML (40-60%), MDS/MPN unclassifiable (overlap syndrome), CCUS and CHIP (1.2021). In patients with MDS, TET2 mutations have been associated with a normal karyotype and shown to positively impact the response to hypomethylating agents compared to patients with wild-type TET2 (NCCN 1.2021). In the absence of JAK2, CALR, and MPL mutations, TET2 mutations may serve as a clonal marker included as one of the major diagnostic criteria for primary myelofibrosis (PMF) (NCCN 1.2020).    DISEASE RELEVANT ALTERATIONS NOT DETECTED:   Negative for JAK2 V617F.  Negative for JAK2 exon 12 mutation.  Negative for CALR mutation.  Negative for MPL mutation.    DISCLAIMER:   This assay is designed to detect targeted clinically-relevant single nucleotide variants and insertions and deletions (<30bp) in a select group of genes. This assay has also been designed to detect specific larger insertions and deletions: FLT3 internal tandem duplication (ITD) and CALR Type I mutations. This assay does not distinguish between somatic and germline alterations in analyzed regions. A negative result (mutation not identified) does not rule out the presence of a mutation below the limit of detection of this assay due to low neoplastic cell content, tumor heterogeneity, or the presence of additional mutations in the listed genes which are outside of the target regions in this assay. Mutations in some homopolymeric regions (eg. ASXL1  p.I914Odn*12) are not consistently detected by this technology. General population polymorphisms, promoter, synonymous and intronic variants (with the exception of splice variants) are not generally included in this report.    Identification or absence of cancer-associated mutations does not necessarily indicate a response to therapy. Decisions on patient care and treatment must be based on the independent medical judgment of the treating physician, taking into account all applicable information concerning the patients condition such as clinical and histopathologic findings, other laboratory findings, and patient preferences. This report includes information from public sources, including scientific and medical literature to better characterize the significance of alterations detected.    This laboratory developed test was developed and its analytical performance characteristics have been determined by [facility]. This test has not been cleared or approved by the FDA; however, the FDA has determined that such approval is not necessary. The [facility] is certified under the Clinical Laboratory Improvement Amendments of 1988 (CLIA-88) as qualified to perform high complexity testing.    PANEL GENE LIST:  ASXL1(11-12), BRAF(15), CALR(9), CBL(7-9), CSF3R(14,17), DNMT3A(8-12,18-19,22-23), ETV6(2-8), EZH2(15-19), FLT3(14-16,20), GATA2(4-6), IDH1(4), IDH2(4), JAK2(12,14), JAK3(4,13,16), KIT(17), KRAS(2-4), MPL(4,10), MYD88(5), NPM1(11), NRAS(2-3), PHF6(2-10), PTPN11(3,13), RUNX1(4-9), SETBP1(4), SF3B1(14-16), SRSF2(1), TET2(3-11), TP53(2-11), U2AF1(2,6), WT1(7,9), ZRSR2(1-10).    Not all exons are sequenced in their entirety. Exons covered are shown in parenthesis. Genome assembly ([id number]) was used for alignment and variant calling.               Radiology/Studies:   US abdomen complete 2/18/2025      IMPRESSION:  Fatty liver.      Previous cholecystectomy. No biliary dilation.      No hydronephrosis bilaterally.       Mild splenomegaly.      Sub visualization of the pancreas, aorta and IVC due to bowel gas.        ASSESSMENT/PLAN     Assessment and Plan:   #1. Polycythemia   69-year-old  female presents for evaluation of elevated red blood cells.  Referred by primary care group. Per medical review of records polycythemia dates back to June 2023 with hemoglobins as high as 16.7 and hematocrit as high as 51.2.  Leukocytosis dating back to April 2022 with WBCs as high as 18 with immature granulocytes noted most recently since June 2024.  Neutrophilia as high as 13.86 dating back to April 2022.     Extensive medical history including A-fib, coronary arterial sclerosis, acute non-ST elevation myocardial infarction, implant and graft with coronary angioplasty, angina pectoris, mixed hyperlipidemia, hypertension, pulmonary embolism on Eliquis, diabetes mellitus, morbid obesity, thyroid nodule, hyperglycemia, chronic kidney disease and urinary tract infection, abnormal liver enzyme results, endometrial cancer, spondylolithiasis of L4 and L5, arthritis, osteoarthritis, total knee replacement, lumbar stenosis with neurogenic claudication, COPD, primary insomnia, and syncope.    Hysterectomy 2015 endometrial cancer.   2017 Colonoscopy [facility] reports normal. Removed one polyp. Due now.     Discussed polycythemia primary and secondary causes.  Patient does have COPD.  She denies having sleep apnea or ever being checked.  She has insomnia and does not sleep well and is up all night.  This could be a contributing factor.  The patient is overweight, is attempting to lose weight and eat healthier.  She is more sedentary due to her chronic pain.  She is receiving steroid injections which may contribute to leukocytosis.  We will order a flow cytometry and myeloid malignancy panel.  Assess inflammatory markers, erythropoietin, SPEP, nutritional values.  Advised an ultrasound to evaluate liver and spleen.  Multiple MRIs of spine  show stenosis.  We will meet in 3 weeks to review results.  Advised colonoscopy as she is due.    Discussion of workup 02/25/2025:   Patient myeloid NGS shows TET2.S792*which may be seen in MDS.  Clinical correlation with symptoms of extensive bone pain and extreme fatigue.  Per NCCN guidelines we will order a bone marrow biopsy.  B12 and folate on the low end of normal.  Patient started supplement.  She will stop 8 days prior to bone marrow biopsy or longer if recommended.  EPO normal range 11.  Flow cytometry not drawn as originally ordered.  Flow will be assessed on bone marrow.  SPEP negative for M protein.  LDH, haptoglobin and reticulocytes are within normal range.  Bun slightly increased, GFR within normal range.  Liver enzymes are normal.    There is noted large renal cysts on ultrasound.  No hydronephrosis.  Referral to nephrology.  Fatty liver disease is with mild splenomegaly.    Review of Bone Marrow Biopsy 04/01/2025  Is recommended for next generation sequencing testing.  Ordered through [employer].  Patient will have drawn today.  There may be an atypical JAK2 presentation.  Patient symptomatic, as stated above, with polycythemia.  PET/CT ordered to assess for malignancy.  She will follow with Dr. Ramirez in 3 to 4 weeks to review testing.  Patient is in agreement.    **Please follow with specialties as scheduled for other health needs and routine screenings.**    Follow up:    RTC:  -4 weeks to review PETC CT and Genetic Sequencing (with Dr. Ramirez)    Medications:  -B12 and folic acid     Imaging/Testing:  -PET CT  -Genetic Sequencing     Referral(s):  -GI due for colonoscopy     Other Pertinent Appointments:  -PCP 5/7/2025  -Cardiology 6/5/2025  -Nephrology 8/4/2025      Patient Discussion Summary:  Discussed plan of care in detail. Patient states understanding and in agreement to the plan. Answered all questions to there liking. The patient will call with any additional questions and/or  concerns.    Thank you for allowing me to participate in your care. It was a pleasure meeting you.    Sincerely,  Vickie Gupta, APRN-CNP     Hematology/Oncology Clinical Nurse Practitioner     [facility]   [address]  [address]  [place], [place] [address]  Office #: [phone]    DISCLAIMER:   In preparing for this visit and writing this note, I reviewed all the previous electronic medical records (testing, labs, imaging, and other procedures, provider notes, and medical charts) of the patient available in the physician portal pertinent to patient care. Significant findings which helped in decision making are recorded in this chart.      This document may have been written by voice recognition software.  There may be some incorrect wording, spelling and/or spelling errors or punctuation errors that were not corrected prior to committing the note to the medical record. Please request clarification if there is documentation error or clarification is needed.   Time based billing: Please see documentation within this specific encounter.

## 2025-04-02 LAB
EST. AVERAGE GLUCOSE BLD GHB EST-MCNC: 88 MG/DL
HBA1C MFR BLD: 4.7 %

## 2025-04-03 DIAGNOSIS — I21.4 NON-ST ELEVATION MI (NSTEMI) (MULTI): ICD-10-CM

## 2025-04-03 DIAGNOSIS — E78.2 MIXED HYPERLIPIDEMIA: ICD-10-CM

## 2025-04-03 RX ORDER — EVOLOCUMAB 140 MG/ML
140 INJECTION, SOLUTION SUBCUTANEOUS
Qty: 6 ML | Refills: 0 | Status: SHIPPED | OUTPATIENT
Start: 2025-04-03 | End: 2026-03-29

## 2025-04-04 ENCOUNTER — PHARMACY VISIT (OUTPATIENT)
Dept: PHARMACY | Facility: CLINIC | Age: 70
End: 2025-04-04
Payer: MEDICARE

## 2025-04-04 DIAGNOSIS — I10 PRIMARY HYPERTENSION: ICD-10-CM

## 2025-04-04 PROCEDURE — RXMED WILLOW AMBULATORY MEDICATION CHARGE

## 2025-04-05 PROCEDURE — RXMED WILLOW AMBULATORY MEDICATION CHARGE

## 2025-04-05 RX ORDER — METOPROLOL SUCCINATE 50 MG/1
50 TABLET, EXTENDED RELEASE ORAL DAILY
Qty: 90 TABLET | Refills: 3 | Status: SHIPPED | OUTPATIENT
Start: 2025-04-05 | End: 2026-03-31

## 2025-04-08 LAB
ELECTRONICALLY SIGNED BY: NORMAL
JAK2 V617F INTERPRETATION: NORMAL
JAK2 V617F RESULT: NOT DETECTED

## 2025-04-09 ENCOUNTER — PHARMACY VISIT (OUTPATIENT)
Dept: PHARMACY | Facility: CLINIC | Age: 70
End: 2025-04-09
Payer: MEDICARE

## 2025-04-10 ENCOUNTER — TELEPHONE (OUTPATIENT)
Dept: HEMATOLOGY/ONCOLOGY | Facility: CLINIC | Age: 70
End: 2025-04-10
Payer: MEDICARE

## 2025-04-10 NOTE — TELEPHONE ENCOUNTER
Was unable to reach, left VM with our office contact information and encouraged pt to call our office back to further discuss.

## 2025-04-11 PROCEDURE — RXMED WILLOW AMBULATORY MEDICATION CHARGE

## 2025-04-14 ENCOUNTER — PHARMACY VISIT (OUTPATIENT)
Dept: PHARMACY | Facility: CLINIC | Age: 70
End: 2025-04-14
Payer: MEDICARE

## 2025-04-16 PROCEDURE — RXMED WILLOW AMBULATORY MEDICATION CHARGE

## 2025-04-17 ENCOUNTER — PHARMACY VISIT (OUTPATIENT)
Dept: PHARMACY | Facility: CLINIC | Age: 70
End: 2025-04-17
Payer: MEDICARE

## 2025-04-21 PROCEDURE — RXMED WILLOW AMBULATORY MEDICATION CHARGE

## 2025-04-22 ENCOUNTER — HOSPITAL ENCOUNTER (OUTPATIENT)
Dept: RADIOLOGY | Facility: CLINIC | Age: 70
Discharge: HOME | End: 2025-04-22
Payer: MEDICARE

## 2025-04-22 DIAGNOSIS — D47.1 CHRONIC MYELOPROLIFERATIVE DISEASE (MULTI): ICD-10-CM

## 2025-04-22 DIAGNOSIS — R23.3 ABNORMAL BRUISING: ICD-10-CM

## 2025-04-22 DIAGNOSIS — Z15.89 GENE MUTATION: ICD-10-CM

## 2025-04-22 DIAGNOSIS — D75.1 POLYCYTHEMIA: ICD-10-CM

## 2025-04-22 DIAGNOSIS — R53.83 OTHER FATIGUE: ICD-10-CM

## 2025-04-22 DIAGNOSIS — M89.8X9 BONE PAIN: ICD-10-CM

## 2025-04-22 LAB — SCAN RESULT: NORMAL

## 2025-04-22 PROCEDURE — A9552 F18 FDG: HCPCS

## 2025-04-22 PROCEDURE — 78816 PET IMAGE W/CT FULL BODY: CPT

## 2025-04-22 PROCEDURE — 3430000001 HC RX 343 DIAGNOSTIC RADIOPHARMACEUTICALS

## 2025-04-22 RX ORDER — FLUDEOXYGLUCOSE F 18 200 MCI/ML
11.6 INJECTION, SOLUTION INTRAVENOUS
Status: COMPLETED | OUTPATIENT
Start: 2025-04-22 | End: 2025-04-22

## 2025-04-22 RX ADMIN — FLUDEOXYGLUCOSE F 18 11.6 MILLICURIE: 200 INJECTION, SOLUTION INTRAVENOUS at 11:10

## 2025-04-23 ENCOUNTER — PHARMACY VISIT (OUTPATIENT)
Dept: PHARMACY | Facility: CLINIC | Age: 70
End: 2025-04-23
Payer: MEDICARE

## 2025-04-30 ASSESSMENT — ENCOUNTER SYMPTOMS
SWEATS: 0
BLACKOUTS: 0
WEAKNESS: 0
HEADACHES: 0
POLYDIPSIA: 0
HUNGER: 0
POLYPHAGIA: 0
FATIGUE: 1
DIZZINESS: 0
SEIZURES: 0
VISUAL CHANGE: 0
NERVOUS/ANXIOUS: 0
WEIGHT LOSS: 0
TREMORS: 0
SPEECH DIFFICULTY: 0
BLURRED VISION: 0
CONFUSION: 0

## 2025-05-01 ENCOUNTER — OFFICE VISIT (OUTPATIENT)
Dept: PAIN MEDICINE | Facility: CLINIC | Age: 70
End: 2025-05-01
Payer: MEDICARE

## 2025-05-01 VITALS — SYSTOLIC BLOOD PRESSURE: 90 MMHG | HEART RATE: 73 BPM | RESPIRATION RATE: 18 BRPM | DIASTOLIC BLOOD PRESSURE: 60 MMHG

## 2025-05-01 DIAGNOSIS — E66.01 MORBIDLY OBESE (MULTI): ICD-10-CM

## 2025-05-01 DIAGNOSIS — M48.062 LUMBAR STENOSIS WITH NEUROGENIC CLAUDICATION: Primary | ICD-10-CM

## 2025-05-01 PROCEDURE — 3078F DIAST BP <80 MM HG: CPT | Performed by: PHYSICAL MEDICINE & REHABILITATION

## 2025-05-01 PROCEDURE — 99214 OFFICE O/P EST MOD 30 MIN: CPT | Performed by: PHYSICAL MEDICINE & REHABILITATION

## 2025-05-01 PROCEDURE — 3044F HG A1C LEVEL LT 7.0%: CPT | Performed by: PHYSICAL MEDICINE & REHABILITATION

## 2025-05-01 PROCEDURE — RXMED WILLOW AMBULATORY MEDICATION CHARGE

## 2025-05-01 PROCEDURE — 1125F AMNT PAIN NOTED PAIN PRSNT: CPT | Performed by: PHYSICAL MEDICINE & REHABILITATION

## 2025-05-01 PROCEDURE — 1123F ACP DISCUSS/DSCN MKR DOCD: CPT | Performed by: PHYSICAL MEDICINE & REHABILITATION

## 2025-05-01 PROCEDURE — 3074F SYST BP LT 130 MM HG: CPT | Performed by: PHYSICAL MEDICINE & REHABILITATION

## 2025-05-01 PROCEDURE — 3048F LDL-C <100 MG/DL: CPT | Performed by: PHYSICAL MEDICINE & REHABILITATION

## 2025-05-01 PROCEDURE — G2211 COMPLEX E/M VISIT ADD ON: HCPCS | Performed by: PHYSICAL MEDICINE & REHABILITATION

## 2025-05-01 RX ORDER — DULOXETIN HYDROCHLORIDE 30 MG/1
30 CAPSULE, DELAYED RELEASE ORAL DAILY
Qty: 30 CAPSULE | Refills: 2 | Status: SHIPPED | OUTPATIENT
Start: 2025-05-01

## 2025-05-01 ASSESSMENT — PAIN SCALES - GENERAL: PAINLEVEL_OUTOF10: 9

## 2025-05-01 NOTE — PROGRESS NOTES
"Subjective   Patient ID: Katya Kaplan \"Mariam\" is a 69 y.o. female who presents for Back Pain and Follow-up.  HPI    69-year-old female with lower back pain with claudication symptoms, we have trialed several epidural steroid injections to include interlaminar as well as transforaminal with unclear relief though not 50% for 3 months.  She continues to not do much and take gabapentin 600 mg 3 times daily.  Her symptoms are otherwise fairly similar to previous evaluation.                  Monitoring and compliance:    ORT:    PDUQ:    Office Agreement:      Review of Systems     Current Outpatient Medications   Medication Instructions    albuterol 2.5 mg /3 mL (0.083 %) nebulizer solution Use 1 vial (2.5 mg) by nebulization every 6 hours if needed for wheezing.    albuterol 90 mcg/actuation inhaler INHALE 2 PUFFS BY MOUTH FOUR TIMES A DAY AS NEEDED FOR WHEEZING    apixaban (Eliquis) 5 mg tablet TAKE 1 TABLET BY MOUTH TWO TIMES A DAY    blood-glucose sensor (FreeStyle Vernell 3 Sensor) device Change sensor every 15 days as directed - Please dispense Vernell 3 plus sensors    DULoxetine (CYMBALTA) 30 mg, oral, Daily, Do not crush or chew.    ezetimibe (ZETIA) 10 mg, oral, Daily    fluticasone furoate-vilanteroL (Breo Ellipta) 200-25 mcg/dose inhaler INHALE 1 PUFF BY MOUTH ONE TIME DAILY    gabapentin (NEURONTIN) 600 mg, oral, 3 times daily    Lantus Solostar U-100 Insulin 10 Units, subcutaneous, Nightly, Discard pen 28 days after use.    metoprolol succinate XL (TOPROL-XL) 50 mg, oral, Daily    Mounjaro 10 mg, subcutaneous, Every 7 days    omeprazole (PRILOSEC) 40 mg, oral, Daily    pen needle, diabetic 32 gauge x 5/32\" needle Use daily with insulin injection.    ranolazine (Ranexa) 500 mg 12 hr tablet TAKE 1 TABLET BY MOUTH TWO TIMES A DAY    Repatha SureClick 140 mg, subcutaneous, Every 14 days    spironolactone (ALDACTONE) 25 mg, oral, Daily        Medical History[1]     Surgical History[2]     Family History[3]     RX " Allergies[4]     MR lumbar spine wo IV contrast 07/23/2024    Narrative  Interpreted By:  Marco Jimenez,  STUDY:  MR LUMBAR SPINE WO IV CONTRAST; ;  7/23/2024 1:25 pm    INDICATION:  Signs/Symptoms:Back pain with claudication symptoms.    COMPARISON:  CT abdomen and pelvis from 6/11/2023.    ACCESSION NUMBER(S):  [id number]    ORDERING CLINICIAN:  MAI MARTINES    TECHNIQUE:  MRI of the lumbar spine was performed with the acquisition of  sagittal T2, sagittal T1, sagittal STIR, axial T1, and axial T2  weighted sequences.    FINDINGS:  This report assumes 5 non-rib bearing lumbar vertebral bodies. The  lowest intervertebral disc will be labeled L5-S1.    There is grade 1 anterolisthesis at L4-L5, unchanged. Vertebral body  heights are maintained. There is mild intervertebral disc height  narrowing at L3-L4. There are chronic degenerative endplate changes  at multiple levels including degenerative Schmorl's node and  osteophyte formation. There is slight STIR hyperintense signal  located within the L3-L4 endplates which may reflect degenerative  osseous edema versus reactive hypervascularity. Marrow signal is  heterogeneous in appearance. The conus medullaris terminates at the  level of L2 and is unremarkable in appearance.    At T12-L1, there is no posterior disc contour abnormality. There is  no spinal canal stenosis or neural foraminal narrowing. There is no  facet osteoarthropathy.    At L1-L2, there is a small diffuse disc bulge. There is no spinal  canal stenosis. There is no neural foraminal narrowing. There is no  facet osteoarthropathy.    At L2-L3, there is a small diffuse disc bulge. There is no spinal  canal stenosis or neural foraminal narrowing. There is mild right  facet osteoarthropathy.    At L3-L4, there is moderate-to-marked spinal canal stenosis due to  diffuse disc bulge, prominent posterior epidural fat, ligamentum  flavum thickening, and marked facet osteoarthropathy. There is  extension of  disc into the bilateral neural foramina causing mild  bilateral neural foraminal narrowing. There is degenerative fluid  causing mild expansion of the facet joints.    At L4-L5, there is minimal spinal canal stenosis due to grade 1  anterolisthesis, ligamentum flavum thickening, and marked facet  osteoarthropathy. There is extension of disc into the neural foramina  but there is no striking neural foraminal narrowing.    At L5-S1, there is no posterior disc contour abnormality. There is no  spinal canal stenosis or neural foraminal narrowing. There is marked  bilateral facet osteoarthropathy.    Partially visualized large cyst in the left kidney.    Impression  1. Moderate-to-marked spinal canal stenosis at L3-L4 due to  combination of degenerative changes and prominent posterior epidural  fat.    2. Additional multilevel degenerative changes of the lumbar spine as  detailed above.    This study was interpreted at [facility].    MACRO:  None    Signed by: Marco Jimenez 7/23/2024 2:51 PM  Dictation workstation:   [id number]      Objective     Vitals:    05/01/25 1042   BP: 90/60   Pulse: 73   Resp: 18      Pain Score:   9        Physical Exam    GENERAL EXAM  Vital Signs: Vital signs to include heart rate, respiration rate, blood pressure, and temperature were reviewed.  General Appearance:  Awake, alert, healthy appearing, well developed, No acute distress.  Head: Normocephalic without evidence of head injury.  Neck: The appearance of the neck was normal without swelling with a midline trachea.  Eyes: The eyelids and eyebrows exhibited no abnormalities.  Pupils were not pin-point.  Sclera was without icterus.  Lungs: Respiration rhythm and depth was normal.  Respiratory movements were normal without labored breathing.  Cardiovascular: No peripheral edema was present.    Neurological: Patient was oriented to time, place, and person.  Speech was normal.  Balance, gait, and stance  were unremarkable.    Psychiatric: Appearance was normal with appropriate dress.  Mood was euthymic and affect was normal.  Skin: Affected regions were without ecchymosis or skin lesions.        Physical exam as above except:  Patient presents in manual wheelchair today          Assessment/Plan   Diagnoses and all orders for this visit:  Lumbar stenosis with neurogenic claudication  -     Referral to Orthopedics and Sports Medicine; Future  -     DULoxetine (Cymbalta) 30 mg DR capsule; Take 1 capsule (30 mg) by mouth once daily. Do not crush or chew.  Morbidly obese (Multi)    69-year-old female with lower back pain with claudication symptoms complicated by class III obesity.  Unfortunately patient has failed multiple steroid injections and from a procedural standpoint there is not much else I can offer.  At this point I suggest that she see one of our surgeons.  Plan:  - Continue working on weight loss.  - Continue with gabapentin 600 mg 3 times daily.  - We will trial the addition of duloxetine 30 mg daily, could increase that dose in the future.  - Referral to Dr. Diallo my orthopedic spine surgery colleague.  Follow-up in a couple months to see how she is doing on the duloxetine.       This note was generated with the aid of dictation software, there may be typos despite my attempts at proofreading.          [1]   Past Medical History:  Diagnosis Date    Acute non-ST elevation myocardial infarction (NSTEMI) (Multi) 05/19/2024    Angina pectoris 10/11/2023    Arthritis     Atrial fibrillation (Multi) 10/11/2023    Benign essential hypertension 09/04/2019    Chest discomfort 10/11/2023    Chronic obstructive pulmonary disease (Multi) 12/30/2018    CKD (chronic kidney disease) 09/09/2022    Coronary arteriosclerosis 10/11/2023    Edema 10/11/2023    Electrocardiogram abnormal 10/11/2023    Endometrial cancer (Multi) 2015    HTN (hypertension) 12/30/2018    Mixed hyperlipidemia 12/30/2018    PE (pulmonary  thromboembolism) (Multi) 10/20/2021    Presence of coronary angioplasty implant and graft 04/08/2022    Type 2 diabetes mellitus without complications (Multi) 12/11/2018   [2]   Past Surgical History:  Procedure Laterality Date    CARDIAC CATHETERIZATION  04/2022    CHOLECYSTECTOMY  12/2016    CORONARY STENT PLACEMENT  04/22    FL GUIDED ASPIRATION OR INJECTION LARGE JOINT LEFT Left 12/18/2023    FL GUIDED ASPIRATION OR INJECTION LARGE JOINT LEFT 12/18/2023 Ralph Triplett MD [facility]    FL GUIDED ASPIRATION OR INJECTION LARGE JOINT RIGHT Right 12/14/2023    FL GUIDED ASPIRATION OR INJECTION LARGE JOINT RIGHT 12/14/2023 Ralph Triplett MD [facility]    HYSTERECTOMY  08/2015    JOINT REPLACEMENT  10/2010   [3]   Family History  Problem Relation Name Age of Onset    Arthritis Mother Betty Villeda     Diabetes Mother Betty Villeda     Heart disease Mother Betty Villeda     Hypertension Mother Betty Villeda     Miscarriages / Stillbirths Mother Betty Villeda     Arthritis Father Vijay Villeda     Cancer Father Vijay Villeda     Diabetes Father Vijay Villeda     Heart disease Father Vijay Villeda     Stroke Maternal Grandmother Inés Licea     Arthritis Sister Mera [place]     Cancer Sister Mera Paige     Diabetes Sister Mera Paige     Hypertension Sister Mera [place]     Ovarian cancer Sister Mera Paige     Cancer Mother's Sister Yahaira De Oliveira     Stroke Mother's Sister Shanda Lundberg    [4]   Allergies  Allergen Reactions    Penicillins Swelling

## 2025-05-02 ENCOUNTER — PHARMACY VISIT (OUTPATIENT)
Dept: PHARMACY | Facility: CLINIC | Age: 70
End: 2025-05-02
Payer: MEDICARE

## 2025-05-04 PROCEDURE — RXMED WILLOW AMBULATORY MEDICATION CHARGE

## 2025-05-05 ENCOUNTER — APPOINTMENT (OUTPATIENT)
Dept: HEMATOLOGY/ONCOLOGY | Facility: CLINIC | Age: 70
End: 2025-05-05
Payer: MEDICARE

## 2025-05-05 ENCOUNTER — OFFICE VISIT (OUTPATIENT)
Dept: HEMATOLOGY/ONCOLOGY | Facility: CLINIC | Age: 70
End: 2025-05-05
Payer: MEDICARE

## 2025-05-05 VITALS
SYSTOLIC BLOOD PRESSURE: 94 MMHG | HEART RATE: 74 BPM | RESPIRATION RATE: 17 BRPM | TEMPERATURE: 96.6 F | WEIGHT: 224.32 LBS | DIASTOLIC BLOOD PRESSURE: 60 MMHG | BODY MASS INDEX: 39.75 KG/M2 | OXYGEN SATURATION: 97 %

## 2025-05-05 DIAGNOSIS — Z15.89 GENE MUTATION: ICD-10-CM

## 2025-05-05 DIAGNOSIS — D75.1 POLYCYTHEMIA: ICD-10-CM

## 2025-05-05 DIAGNOSIS — M89.8X9 BONE PAIN: ICD-10-CM

## 2025-05-05 PROCEDURE — 3044F HG A1C LEVEL LT 7.0%: CPT | Performed by: INTERNAL MEDICINE

## 2025-05-05 PROCEDURE — 3078F DIAST BP <80 MM HG: CPT | Performed by: INTERNAL MEDICINE

## 2025-05-05 PROCEDURE — 1159F MED LIST DOCD IN RCRD: CPT | Performed by: INTERNAL MEDICINE

## 2025-05-05 PROCEDURE — 1125F AMNT PAIN NOTED PAIN PRSNT: CPT | Performed by: INTERNAL MEDICINE

## 2025-05-05 PROCEDURE — 1123F ACP DISCUSS/DSCN MKR DOCD: CPT | Performed by: INTERNAL MEDICINE

## 2025-05-05 PROCEDURE — 99215 OFFICE O/P EST HI 40 MIN: CPT | Performed by: INTERNAL MEDICINE

## 2025-05-05 PROCEDURE — 3048F LDL-C <100 MG/DL: CPT | Performed by: INTERNAL MEDICINE

## 2025-05-05 PROCEDURE — 3074F SYST BP LT 130 MM HG: CPT | Performed by: INTERNAL MEDICINE

## 2025-05-05 SDOH — ECONOMIC STABILITY: FOOD INSECURITY: WITHIN THE PAST 12 MONTHS, YOU WORRIED THAT YOUR FOOD WOULD RUN OUT BEFORE YOU GOT THE MONEY TO BUY MORE.: NEVER TRUE

## 2025-05-05 SDOH — ECONOMIC STABILITY: FOOD INSECURITY: WITHIN THE PAST 12 MONTHS, THE FOOD YOU BOUGHT JUST DIDN'T LAST AND YOU DIDN'T HAVE MONEY TO GET MORE.: NEVER TRUE

## 2025-05-05 ASSESSMENT — ENCOUNTER SYMPTOMS
CARDIOVASCULAR NEGATIVE: 1
RESPIRATORY NEGATIVE: 1
CONSTITUTIONAL NEGATIVE: 1
GASTROINTESTINAL NEGATIVE: 1

## 2025-05-05 ASSESSMENT — COLUMBIA-SUICIDE SEVERITY RATING SCALE - C-SSRS
1. IN THE PAST MONTH, HAVE YOU WISHED YOU WERE DEAD OR WISHED YOU COULD GO TO SLEEP AND NOT WAKE UP?: NO
2. HAVE YOU ACTUALLY HAD ANY THOUGHTS OF KILLING YOURSELF?: NO
6. HAVE YOU EVER DONE ANYTHING, STARTED TO DO ANYTHING, OR PREPARED TO DO ANYTHING TO END YOUR LIFE?: NO

## 2025-05-05 ASSESSMENT — PATIENT HEALTH QUESTIONNAIRE - PHQ9
2. FEELING DOWN, DEPRESSED OR HOPELESS: NOT AT ALL
SUM OF ALL RESPONSES TO PHQ9 QUESTIONS 1 AND 2: 0
1. LITTLE INTEREST OR PLEASURE IN DOING THINGS: NOT AT ALL

## 2025-05-05 ASSESSMENT — PAIN SCALES - GENERAL: PAINLEVEL_OUTOF10: 8

## 2025-05-05 NOTE — PROGRESS NOTES
Patient ID: Mariam Kaplan is a 69 y.o. female.  Referring Physician: Vickie Gupta, APRN-CNP  [id number] [place] [address]  Primary Care Provider: Trupti Greene MD  Visit Type:  Follow Up     Subjective    HPI  69-year-old  female presents for evaluation of elevated red blood cells.  Referred by primary care group. Per medical review of records polycythemia dates back to June 2023 with hemoglobins as high as 16.7 and hematocrit as high as 51.2.  Leukocytosis dating back to April 2022 with WBCs as high as 18 with immature granulocytes noted most recently since June 2024.  Neutrophilia as high as 13.86 dating back to April 2022.      Extensive medical history including A-fib, coronary arterial sclerosis, acute non-ST elevation myocardial infarction, implant and graft with coronary angioplasty, angina pectoris, mixed hyperlipidemia, hypertension, pulmonary embolism on Eliquis, diabetes mellitus, morbid obesity, thyroid nodule, hyperglycemia, chronic kidney disease and urinary tract infection, abnormal liver enzyme results, endometrial cancer, spondylolithiasis of L4 and L5, arthritis, osteoarthritis, total knee replacement, lumbar stenosis with neurogenic claudication, COPD, primary insomnia, and syncope.    No energy and is fatigued. History of pulmonary embolism and DVT following knee replacement surgery. Remains on Eliquis.      Hysterectomy 2015 endometrial cancer.   2017 Colonoscopy [facility] reports normal.  Review of Systems   Constitutional: Negative.    HENT:  Negative.     Respiratory: Negative.     Cardiovascular: Negative.    Gastrointestinal: Negative.         Objective   BSA: There is no height or weight on file to calculate BSA.  There were no vitals taken for this visit.     has a past medical history of Acute non-ST elevation myocardial infarction (NSTEMI) (Multi) (05/19/2024), Angina pectoris (10/11/2023), Arthritis, Atrial fibrillation (Multi) (10/11/2023), Benign essential  "hypertension (09/04/2019), Chest discomfort (10/11/2023), Chronic obstructive pulmonary disease (Multi) (12/30/2018), CKD (chronic kidney disease) (09/09/2022), Coronary arteriosclerosis (10/11/2023), Edema (10/11/2023), Electrocardiogram abnormal (10/11/2023), Endometrial cancer (Multi) (2015), HTN (hypertension) (12/30/2018), Mixed hyperlipidemia (12/30/2018), PE (pulmonary thromboembolism) (Multi) (10/20/2021), Presence of coronary angioplasty implant and graft (04/08/2022), and Type 2 diabetes mellitus without complications (Multi) (12/11/2018).   has a past surgical history that includes FL guided aspiration or injection large joint right (Right, 12/14/2023); FL guided aspiration or injection large joint left (Left, 12/18/2023); Cardiac catheterization (04/2022); Cholecystectomy (12/2016); Hysterectomy (08/2015); Joint replacement (10/2010); and Coronary stent placement (04/22).  Family History[1]  Oncology History    No history exists.       Katya Kaplan \"Mariam\"  reports that she quit smoking about 27 years ago. Her smoking use included cigarettes. She has never been exposed to tobacco smoke. She has never used smokeless tobacco.  She  reports no history of alcohol use.  She  reports no history of drug use.    Physical Exam  Constitutional:       Appearance: Normal appearance.   HENT:      Head: Normocephalic and atraumatic.   Eyes:      Extraocular Movements: Extraocular movements intact.      Pupils: Pupils are equal, round, and reactive to light.   Neurological:      Mental Status: She is alert.         WBC   Date/Time Value Ref Range Status   03/24/2025 01:27 PM 11.8 (H) 4.4 - 11.3 x10*3/uL Final   02/03/2025 12:09 PM 11.8 (H) 4.4 - 11.3 x10*3/uL Final   11/01/2024 01:07 PM 17.5 (H) 4.4 - 11.3 x10*3/uL Final     nRBC   Date Value Ref Range Status   03/24/2025 0.0 0.0 - 0.0 /100 WBCs Final   02/03/2025   Final     Comment:     Not Measured   11/01/2024 0.0 0.0 - 0.0 /100 WBCs Final     RBC   Date Value Ref " Range Status   03/24/2025 5.32 (H) 4.00 - 5.20 x10*6/uL Final   02/03/2025 5.13 4.00 - 5.20 x10*6/uL Final   11/01/2024 5.48 (H) 4.00 - 5.20 x10*6/uL Final     Hemoglobin   Date Value Ref Range Status   03/24/2025 16.5 (H) 12.0 - 16.0 g/dL Final   02/03/2025 15.7 12.0 - 16.0 g/dL Final   11/01/2024 16.7 (H) 12.0 - 16.0 g/dL Final     Hematocrit   Date Value Ref Range Status   03/24/2025 49.2 (H) 36.0 - 46.0 % Final   02/03/2025 48.0 (H) 36.0 - 46.0 % Final   11/01/2024 51.2 (H) 36.0 - 46.0 % Final     MCV   Date/Time Value Ref Range Status   03/24/2025 01:27 PM 93 80 - 100 fL Final   02/03/2025 12:09 PM 94 80 - 100 fL Final   11/01/2024 01:07 PM 93 80 - 100 fL Final     MCH   Date/Time Value Ref Range Status   03/24/2025 01:27 PM 31.0 26.0 - 34.0 pg Final   02/03/2025 12:09 PM 30.6 26.0 - 34.0 pg Final   11/01/2024 01:07 PM 30.5 26.0 - 34.0 pg Final     MCHC   Date/Time Value Ref Range Status   03/24/2025 01:27 PM 33.5 32.0 - 36.0 g/dL Final   02/03/2025 12:09 PM 32.7 32.0 - 36.0 g/dL Final   11/01/2024 01:07 PM 32.6 32.0 - 36.0 g/dL Final     RDW   Date/Time Value Ref Range Status   03/24/2025 01:27 PM 13.2 11.5 - 14.5 % Final   02/03/2025 12:09 PM 13.0 11.5 - 14.5 % Final   11/01/2024 01:07 PM 13.6 11.5 - 14.5 % Final     Platelets   Date/Time Value Ref Range Status   03/24/2025 01:27  150 - 450 x10*3/uL Final   02/03/2025 12:09  150 - 450 x10*3/uL Final   11/01/2024 01:07  150 - 450 x10*3/uL Final     MPV   Date/Time Value Ref Range Status   09/01/2023 02:17 PM 11.9 7.0 - 12.6 CU Final   06/11/2023 04:54 AM 11.6 7.0 - 12.6 CU Final   03/23/2022 03:42 PM 12.3 7.0 - 12.6 CU Final     Neutrophils %   Date/Time Value Ref Range Status   03/24/2025 01:27 PM 70.0 40.0 - 80.0 % Final   02/03/2025 12:09 PM 77.4 40.0 - 80.0 % Final   11/01/2024 01:07 PM 79.2 40.0 - 80.0 % Final     Immature Granulocytes %, Automated   Date/Time Value Ref Range Status   03/24/2025 01:27 PM 0.7 0.0 - 0.9 % Final      Comment:     Immature Granulocyte Count (IG) includes promyelocytes, myelocytes and metamyelocytes but does not include bands. Percent differential counts (%) should be interpreted in the context of the absolute cell counts (cells/UL).   02/03/2025 12:09 PM 0.5 0.0 - 0.9 % Final     Comment:     Immature Granulocyte Count (IG) includes promyelocytes, myelocytes and metamyelocytes but does not include bands. Percent differential counts (%) should be interpreted in the context of the absolute cell counts (cells/UL).   11/01/2024 01:07 PM 1.0 (H) 0.0 - 0.9 % Final     Comment:     Immature Granulocyte Count (IG) includes promyelocytes, myelocytes and metamyelocytes but does not include bands. Percent differential counts (%) should be interpreted in the context of the absolute cell counts (cells/UL).     Lymphocytes %   Date/Time Value Ref Range Status   03/24/2025 01:27 PM 19.1 13.0 - 44.0 % Final   02/03/2025 12:09 PM 15.5 13.0 - 44.0 % Final   11/01/2024 01:07 PM 12.8 13.0 - 44.0 % Final     Monocytes %   Date/Time Value Ref Range Status   03/24/2025 01:27 PM 7.3 2.0 - 10.0 % Final   02/03/2025 12:09 PM 5.2 2.0 - 10.0 % Final   11/01/2024 01:07 PM 6.9 2.0 - 10.0 % Final     Eosinophils %   Date/Time Value Ref Range Status   03/24/2025 01:27 PM 2.5 0.0 - 6.0 % Final   02/03/2025 12:09 PM 1.3 0.0 - 6.0 % Final   11/01/2024 01:07 PM 0.0 0.0 - 6.0 % Final     Basophils %   Date/Time Value Ref Range Status   03/24/2025 01:27 PM 0.4 0.0 - 2.0 % Final   02/03/2025 12:09 PM 0.1 0.0 - 2.0 % Final   11/01/2024 01:07 PM 0.1 0.0 - 2.0 % Final     Neutrophils Absolute   Date/Time Value Ref Range Status   03/24/2025 01:27 PM 8.27 (H) 1.20 - 7.70 x10*3/uL Final     Comment:     Percent differential counts (%) should be interpreted in the context of the absolute cell counts (cells/uL).   02/03/2025 12:09 PM 9.16 (H) 1.20 - 7.70 x10*3/uL Final     Comment:     Percent differential counts (%) should be interpreted in the context of  "the absolute cell counts (cells/uL).   11/01/2024 01:07 PM 13.86 (H) 1.20 - 7.70 x10*3/uL Final     Comment:     Percent differential counts (%) should be interpreted in the context of the absolute cell counts (cells/uL).     Immature Granulocytes Absolute, Automated   Date/Time Value Ref Range Status   03/24/2025 01:27 PM 0.08 0.00 - 0.70 x10*3/uL Final   02/03/2025 12:09 PM 0.06 0.00 - 0.70 x10*3/uL Final   11/01/2024 01:07 PM 0.18 0.00 - 0.70 x10*3/uL Final     Lymphocytes Absolute   Date/Time Value Ref Range Status   03/24/2025 01:27 PM 2.26 1.20 - 4.80 x10*3/uL Final   02/03/2025 12:09 PM 1.83 1.20 - 4.80 x10*3/uL Final   11/01/2024 01:07 PM 2.25 1.20 - 4.80 x10*3/uL Final     Monocytes Absolute   Date/Time Value Ref Range Status   03/24/2025 01:27 PM 0.86 0.10 - 1.00 x10*3/uL Final   02/03/2025 12:09 PM 0.61 0.10 - 1.00 x10*3/uL Final   11/01/2024 01:07 PM 1.20 (H) 0.10 - 1.00 x10*3/uL Final     Eosinophils Absolute   Date/Time Value Ref Range Status   03/24/2025 01:27 PM 0.29 0.00 - 0.70 x10*3/uL Final   02/03/2025 12:09 PM 0.15 0.00 - 0.70 x10*3/uL Final   11/01/2024 01:07 PM 0.00 0.00 - 0.70 x10*3/uL Final     Basophils Absolute   Date/Time Value Ref Range Status   03/24/2025 01:27 PM 0.05 0.00 - 0.10 x10*3/uL Final   02/03/2025 12:09 PM 0.01 0.00 - 0.10 x10*3/uL Final   11/01/2024 01:07 PM 0.02 0.00 - 0.10 x10*3/uL Final       No components found for: \"PT\"  aPTT   Date/Time Value Ref Range Status   04/03/2022 12:55  (HH) 26 - 39 sec Final     Comment:       THE APTT IS NO LONGER USED FOR MONITORING     UNFRACTIONATED HEPARIN THERAPY.    FOR MONITORING HEPARIN THERAPY,     USE THE HEPARIN ASSAY.  .   03:09 04/03/2022.  CRIT APTT CALLED TO RAMESH XAVIER, 04/03/2022 03:09     04/01/2022 08:28 PM 29 26 - 39 sec Final     Comment:       THE APTT IS NO LONGER USED FOR MONITORING     UNFRACTIONATED HEPARIN THERAPY.    FOR MONITORING HEPARIN THERAPY,     USE THE HEPARIN ASSAY.     10/12/2021 07:29 AM 26.4 " 22.0 - 32.5 SEC Final     Comment:     Performed at [facility] [address] [place] [id number]     A, B & C. BONE MARROW CLOT WITH ASPIRATE AND CORE WITH TOUCH PREP, LEFT ILIAC CREST:    -- MILD HYPERCELLULAR BONE MARROW (50%) WITH MATURING TRILINEAGE HEMATOPOIESIS   -- NO INCREASE IN BLASTS     Comment: Elevated hemoglobin and red blood cells from June 2023 are noted. Molecular test detected TET2 with low VAF (4%), which is likely due to clonal hematopoiesis.  Previous molecular test showed no JAK2 V617 mutation but diagnosis of polycythemia vera cannot be completely ruled out given the fact that JAK2 atypical mutation could exist. A larger next generation sequence (NGS) panel may be more informative.    Assessment/Plan      Discussed polycythemia primary and secondary causes. Patient does have COPD. She denies having sleep apnea or ever being checked. She has insomnia and does not sleep well and is up all night. This could be a contributing factor.   69-year-old  female presents for evaluation of elevated red blood cells. Referred by primary care group. Per medical review of records polycythemia dates back to June 2023 with hemoglobins as high as 16.7 and hematocrit as high as 51.2. Leukocytosis dating back to April 2022 with WBCs as high as 18 with immature granulocytes noted most recently since June 2024. Neutrophilia as high as 13.86 dating back to April 2022.   PET scan dated 4/22/2025 reported as showing limited examination due to hypermetabolic activity diffusely through the musculature with limitations of the exam no evidence of medullary or extramedullary myelomatous or malignant disease is noted reported 4/24/2025.    Given these findings patient's polycythemia most likely secondary.  Patient has a history of COPD and a suggestive history of obstructive sleep apnea.    Will DC from Heme: Management is symptomatic control: Follow up with Primary Onc follow up :      Diagnoses and all orders for this  visit:  Polycythemia  -     Clinic Appointment Request Follow up; ABDIRAHMAN DONOHUE (Review Genetic Sequencing)  -     Clinic Appointment Request Follow Up (with RM); Future  Gene mutation  -     Clinic Appointment Request Follow up; ABDIRAHMAN DONOHUE (Review Genetic Sequencing)  Bone pain  -     Clinic Appointment Request Follow up; ABDIRAHMAN DONOHUE (Review Genetic Sequencing)           Abdirahman Donohue MD                              [1]   Family History  Problem Relation Name Age of Onset    Arthritis Mother Betty Dallasg     Diabetes Mother Betty Dallasg     Heart disease Mother Betty Cejatrong     Hypertension Mother Betty Dallasg     Miscarriages / Stillbirths Mother Betty Cejatrong     Arthritis Father Vijay Dallasg     Cancer Father Vijay Dallasg     Diabetes Father Vijay Dallasg     Heart disease Father Vijay Villeda     Stroke Maternal Grandmother Inés Licea     Arthritis Sister Mera [place]     Cancer Sister Mera Gibson     Diabetes Sister Mera Gibson     Hypertension Sister Mera Gibson     Ovarian cancer Sister Mera Gibson     Cancer Mother's Sister Yahairajv De Oliveira     Stroke Mother's Sister Shanda Lundberg

## 2025-05-05 NOTE — PATIENT INSTRUCTIONS
Today you met with your hematologist/oncologist.  Recent labs were discussed and questions answered.  Scheduling orders were placed.  While we appreciate that you verbalized understanding, if any questions arise after leaving, please do not hesitate to call the office to discuss.  [phone] Loyda Ramirez has reviewed your bone marrow biopsy. You do not need to follow with Dr Ramirez at this time. Dr Ramirez has referred you back to Vickie Gupta in 3 months.

## 2025-05-06 ENCOUNTER — APPOINTMENT (OUTPATIENT)
Dept: PRIMARY CARE | Facility: CLINIC | Age: 70
End: 2025-05-06
Payer: MEDICARE

## 2025-05-07 ENCOUNTER — APPOINTMENT (OUTPATIENT)
Dept: PRIMARY CARE | Facility: CLINIC | Age: 70
End: 2025-05-07
Payer: MEDICARE

## 2025-05-07 ENCOUNTER — TELEPHONE (OUTPATIENT)
Dept: PRIMARY CARE | Facility: CLINIC | Age: 70
End: 2025-05-07

## 2025-05-07 ENCOUNTER — PHARMACY VISIT (OUTPATIENT)
Dept: PHARMACY | Facility: CLINIC | Age: 70
End: 2025-05-07
Payer: MEDICARE

## 2025-05-07 VITALS
WEIGHT: 224 LBS | SYSTOLIC BLOOD PRESSURE: 108 MMHG | HEART RATE: 67 BPM | DIASTOLIC BLOOD PRESSURE: 70 MMHG | HEIGHT: 63 IN | OXYGEN SATURATION: 96 % | BODY MASS INDEX: 39.69 KG/M2

## 2025-05-07 DIAGNOSIS — I10 HYPERTENSION, UNSPECIFIED TYPE: ICD-10-CM

## 2025-05-07 DIAGNOSIS — Z79.4 TYPE 2 DIABETES MELLITUS WITH OTHER SPECIFIED COMPLICATION, WITH LONG-TERM CURRENT USE OF INSULIN: ICD-10-CM

## 2025-05-07 DIAGNOSIS — E78.2 MIXED HYPERLIPIDEMIA: ICD-10-CM

## 2025-05-07 DIAGNOSIS — I10 BENIGN ESSENTIAL HYPERTENSION: ICD-10-CM

## 2025-05-07 DIAGNOSIS — E11.69 TYPE 2 DIABETES MELLITUS WITH OTHER SPECIFIED COMPLICATION, WITH LONG-TERM CURRENT USE OF INSULIN: Primary | ICD-10-CM

## 2025-05-07 DIAGNOSIS — E11.69 TYPE 2 DIABETES MELLITUS WITH OTHER SPECIFIED COMPLICATION, WITH LONG-TERM CURRENT USE OF INSULIN: ICD-10-CM

## 2025-05-07 DIAGNOSIS — J44.9 COPD MIXED TYPE (MULTI): ICD-10-CM

## 2025-05-07 DIAGNOSIS — Z79.4 TYPE 2 DIABETES MELLITUS WITH OTHER SPECIFIED COMPLICATION, WITH LONG-TERM CURRENT USE OF INSULIN: Primary | ICD-10-CM

## 2025-05-07 PROBLEM — I21.4 ACUTE NON-ST ELEVATION MYOCARDIAL INFARCTION (NSTEMI) (MULTI): Status: RESOLVED | Noted: 2024-05-19 | Resolved: 2025-05-07

## 2025-05-07 PROCEDURE — 99214 OFFICE O/P EST MOD 30 MIN: CPT | Performed by: STUDENT IN AN ORGANIZED HEALTH CARE EDUCATION/TRAINING PROGRAM

## 2025-05-07 PROCEDURE — 1159F MED LIST DOCD IN RCRD: CPT | Performed by: STUDENT IN AN ORGANIZED HEALTH CARE EDUCATION/TRAINING PROGRAM

## 2025-05-07 PROCEDURE — RXMED WILLOW AMBULATORY MEDICATION CHARGE

## 2025-05-07 PROCEDURE — 1160F RVW MEDS BY RX/DR IN RCRD: CPT | Performed by: STUDENT IN AN ORGANIZED HEALTH CARE EDUCATION/TRAINING PROGRAM

## 2025-05-07 PROCEDURE — 3048F LDL-C <100 MG/DL: CPT | Performed by: STUDENT IN AN ORGANIZED HEALTH CARE EDUCATION/TRAINING PROGRAM

## 2025-05-07 PROCEDURE — 1036F TOBACCO NON-USER: CPT | Performed by: STUDENT IN AN ORGANIZED HEALTH CARE EDUCATION/TRAINING PROGRAM

## 2025-05-07 PROCEDURE — 3078F DIAST BP <80 MM HG: CPT | Performed by: STUDENT IN AN ORGANIZED HEALTH CARE EDUCATION/TRAINING PROGRAM

## 2025-05-07 PROCEDURE — 3074F SYST BP LT 130 MM HG: CPT | Performed by: STUDENT IN AN ORGANIZED HEALTH CARE EDUCATION/TRAINING PROGRAM

## 2025-05-07 PROCEDURE — 3044F HG A1C LEVEL LT 7.0%: CPT | Performed by: STUDENT IN AN ORGANIZED HEALTH CARE EDUCATION/TRAINING PROGRAM

## 2025-05-07 PROCEDURE — 3008F BODY MASS INDEX DOCD: CPT | Performed by: STUDENT IN AN ORGANIZED HEALTH CARE EDUCATION/TRAINING PROGRAM

## 2025-05-07 PROCEDURE — 1125F AMNT PAIN NOTED PAIN PRSNT: CPT | Performed by: STUDENT IN AN ORGANIZED HEALTH CARE EDUCATION/TRAINING PROGRAM

## 2025-05-07 PROCEDURE — G2211 COMPLEX E/M VISIT ADD ON: HCPCS | Performed by: STUDENT IN AN ORGANIZED HEALTH CARE EDUCATION/TRAINING PROGRAM

## 2025-05-07 ASSESSMENT — ENCOUNTER SYMPTOMS
WEAKNESS: 0
NERVOUS/ANXIOUS: 0
DIZZINESS: 0
SEIZURES: 0
FATIGUE: 1
POLYDIPSIA: 0
TREMORS: 0
CONFUSION: 0
POLYPHAGIA: 0
SPEECH DIFFICULTY: 0
HEADACHES: 0

## 2025-05-07 ASSESSMENT — PATIENT HEALTH QUESTIONNAIRE - PHQ9
2. FEELING DOWN, DEPRESSED OR HOPELESS: NOT AT ALL
1. LITTLE INTEREST OR PLEASURE IN DOING THINGS: NOT AT ALL
SUM OF ALL RESPONSES TO PHQ9 QUESTIONS 1 AND 2: 0

## 2025-05-07 ASSESSMENT — PAIN SCALES - GENERAL: PAINLEVEL_OUTOF10: 7

## 2025-05-07 NOTE — PROGRESS NOTES
"                                                                                                                 GENERAL PROGRESS NOTE    Chief Complaint   Patient presents with    Hypertension     DM- labs/A1c  completed 04/01/2025       HPI  Katya Kaplan is a 69 y.o. female  that presents today for diabetic check. She is a prior pt of Janna Lux. This is our first meeting.     #T2DM  Current medication regimen:   Lantus 6 units   - self decreased to 6 units about 3 weeks ago - lower blood sugars in the 70's  Mounjaro 10 mg  - dose increased last visit with Mamie Raza, has taken about 2 doses so far  Tolerating current medication regimen yes  Blood sugars at home averaging - fasting low 100's  CGM? yes  Hypoglycemic episodes yes - was having blood sugars low 70's, none since decreasing insulin dose  Last A1C : 4.7  Last eGFR - >90  Last Creatinine - 0.69  Last microalbumin - 11/2024  Eye exam - 11/2024 [employer] in office - no signs of retinopathy  Follows with podiatry no  Is in [employer]  Not exercising aside from back stretches - unable due to chronic pain  - Statin? Zetia and repatha     Sees pain management Dr. Victor.  Saw Monday and started on cymbalta on Monday. Was also referred to spine specialist to see after 3 month trial of cymbalta    Sees orthopedics for knee and hip OA has had injections. Has appt next week for hip injections.     Cardiology - Dr. Esteban. sees in June. CAD s/p PCI to RCA 2021. Hx of Afib, PE. On Eliquis.   Hematology - Vickie SALVADOR/Dr Ramirez. Referred due to polycythemia. Work up benign and included bone marrow biopsy    Nephrology - will be establishing with  for renal cyst, referred by hematology    Vital signs   /70 (BP Location: Left arm, Patient Position: Sitting, BP Cuff Size: Large adult)   Pulse 67   Ht 1.6 m (5' 3\")   Wt 102 kg (224 lb)   SpO2 96%   BMI 39.68 kg/m²      Current Medications[1]    Review of Systems   Constitutional:  " Positive for fatigue.   Cardiovascular:  Negative for chest pain.   Endocrine: Negative for polydipsia, polyphagia and polyuria.   Skin:  Negative for pallor.   Neurological:  Negative for dizziness, tremors, seizures, speech difficulty, weakness and headaches.   Psychiatric/Behavioral:  Negative for confusion. The patient is not nervous/anxious.         Physical Exam  Vitals and nursing note reviewed.   Constitutional:       General: She is not in acute distress.     Appearance: She is not ill-appearing.   Cardiovascular:      Rate and Rhythm: Normal rate and regular rhythm.      Heart sounds: Normal heart sounds. No murmur heard.  Pulmonary:      Effort: Pulmonary effort is normal. No respiratory distress.      Breath sounds: Normal breath sounds. No wheezing, rhonchi or rales.   Musculoskeletal:      Right lower leg: No edema.      Left lower leg: No edema.         ASSESSMENT AND PLAN   1. Type 2 diabetes mellitus with other specified complication, with long-term current use of insulin (Primary)  Controlled  Continue current medication regimen.   Routine diabetic retinopathy screening up to date   Continue dietary efforts and physical activity    2. COPD mixed type (Multi)  Stable. Continue inhalers.     3. Atrial fibrillation, unspecified type (Multi)  RRR on exam. On xarelto.     .diag    Trupti Greene MD  05/07/25  1:11 PM    Answers submitted by the patient for this visit:  Diabetes Questionnaire (Submitted on 4/30/2025)  Chief Complaint: Diabetes problem  Diabetes type: type 2  MedicAlert ID: No  Disease duration: 10 Years  blurred vision: No  foot paresthesias: Yes  foot ulcerations: No  visual change: No  weight loss: No  Symptom course: improving  hunger: No  mood changes: No  sleepiness: No  sweats: No  blackouts: No  hospitalization: No  nocturnal hypoglycemia: No  required assistance: No  required glucagon: No  CVA: No  heart disease: No  impotence: No  nephropathy: No  peripheral neuropathy: No  PVD:  No  retinopathy: No  CAD risks: dyslipidemia, family history, hypertension, obesity, sedentary lifestyle  Current treatments: insulin injections  Treatment compliance: all of the time  Dose schedule: at bedtime  Given by: patient  Injection sites: abdominal wall  Home blood tests: 5+ x per day  Home urines: <1 x per month  Monitoring compliance: good  Blood glucose trend: decreasing steadily  breakfast time: 8-9 am  breakfast glucose level: 70-90  lunch time: 2-3 pm  lunch glucose level:   dinner time: 7-8 pm  dinner glucose level:   Bedtime: after 11 pm  Bedtime glucose level:   High score:   Overall:   Weight trend: decreasing steadily  Current diet: generally healthy  Meal planning: avoidance of concentrated sweets  Exercise: rarely  Dietitian visit: Yes  Eye exam current: Yes  Sees podiatrist: No         [1]   Current Outpatient Medications   Medication Sig Dispense Refill    albuterol 2.5 mg /3 mL (0.083 %) nebulizer solution Use 1 vial (2.5 mg) by nebulization every 6 hours if needed for wheezing. 75 mL 0    albuterol 90 mcg/actuation inhaler INHALE 2 PUFFS BY MOUTH FOUR TIMES A DAY AS NEEDED FOR WHEEZING 20.1 g 1    apixaban (Eliquis) 5 mg tablet TAKE 1 TABLET BY MOUTH TWO TIMES A  tablet 1    blood-glucose sensor (FreeStyle Vernell 3 Sensor) device Change sensor every 15 days as directed - Please dispense Vernell 3 plus sensors 6 each 3    DULoxetine (Cymbalta) 30 mg DR capsule Take 1 capsule (30 mg) by mouth once daily. Do not crush or chew. 30 capsule 2    evolocumab (Repatha SureClick) 140 mg/mL injection Inject 1 mL (140 mg) under the skin every 14 (fourteen) days. 6 mL 0    ezetimibe (Zetia) 10 mg tablet Take 1 tablet (10 mg) by mouth once daily. 90 tablet 3    fluticasone furoate-vilanteroL (Breo Ellipta) 200-25 mcg/dose inhaler INHALE 1 PUFF BY MOUTH ONE TIME DAILY 180 each 1    gabapentin (Neurontin) 300 mg capsule Take 2 capsules (600 mg) by mouth 3 times a day. 180  "capsule 3    insulin glargine (Lantus Solostar U-100 Insulin) 100 unit/mL (3 mL) pen Inject 10 Units under the skin once daily at bedtime. Discard pen 28 days after use. 15 mL 1    metoprolol succinate XL (Toprol-XL) 50 mg 24 hr tablet Take 1 tablet (50 mg) by mouth once daily. 90 tablet 3    omeprazole (PriLOSEC) 40 mg DR capsule Take 1 capsule (40 mg) by mouth once daily. 90 capsule 1    pen needle, diabetic 32 gauge x 5/32\" needle Use daily with insulin injection. 100 each 2    ranolazine (Ranexa) 500 mg 12 hr tablet TAKE 1 TABLET BY MOUTH TWO TIMES A  tablet 3    spironolactone (Aldactone) 25 mg tablet Take 1 tablet (25 mg) by mouth once daily. 90 tablet 1    tirzepatide (Mounjaro) 10 mg/0.5 mL pen injector Inject 10 mg under the skin every 7 days. 6 mL 1     No current facility-administered medications for this visit.     "

## 2025-05-09 ENCOUNTER — PHARMACY VISIT (OUTPATIENT)
Dept: PHARMACY | Facility: CLINIC | Age: 70
End: 2025-05-09
Payer: MEDICARE

## 2025-05-14 DIAGNOSIS — I20.9 ANGINA PECTORIS: ICD-10-CM

## 2025-05-15 ENCOUNTER — OFFICE VISIT (OUTPATIENT)
Dept: ORTHOPEDIC SURGERY | Facility: CLINIC | Age: 70
End: 2025-05-15
Payer: MEDICARE

## 2025-05-15 ENCOUNTER — TELEPHONE (OUTPATIENT)
Dept: ORTHOPEDIC SURGERY | Facility: CLINIC | Age: 70
End: 2025-05-15

## 2025-05-15 ENCOUNTER — APPOINTMENT (OUTPATIENT)
Dept: ORTHOPEDIC SURGERY | Facility: CLINIC | Age: 70
End: 2025-05-15
Payer: MEDICARE

## 2025-05-15 ENCOUNTER — HOSPITAL ENCOUNTER (OUTPATIENT)
Dept: RADIOLOGY | Facility: CLINIC | Age: 70
Discharge: HOME | End: 2025-05-15
Payer: MEDICARE

## 2025-05-15 DIAGNOSIS — Z01.818 PRE-OP EVALUATION: Primary | ICD-10-CM

## 2025-05-15 DIAGNOSIS — M25.552 BILATERAL HIP PAIN: ICD-10-CM

## 2025-05-15 DIAGNOSIS — M25.551 BILATERAL HIP PAIN: ICD-10-CM

## 2025-05-15 DIAGNOSIS — M16.11 ARTHRITIS OF RIGHT HIP: ICD-10-CM

## 2025-05-15 PROCEDURE — 3044F HG A1C LEVEL LT 7.0%: CPT | Performed by: ORTHOPAEDIC SURGERY

## 2025-05-15 PROCEDURE — 1159F MED LIST DOCD IN RCRD: CPT | Performed by: ORTHOPAEDIC SURGERY

## 2025-05-15 PROCEDURE — 1036F TOBACCO NON-USER: CPT | Performed by: ORTHOPAEDIC SURGERY

## 2025-05-15 PROCEDURE — 99215 OFFICE O/P EST HI 40 MIN: CPT | Performed by: ORTHOPAEDIC SURGERY

## 2025-05-15 PROCEDURE — 73523 X-RAY EXAM HIPS BI 5/> VIEWS: CPT

## 2025-05-15 PROCEDURE — 3048F LDL-C <100 MG/DL: CPT | Performed by: ORTHOPAEDIC SURGERY

## 2025-05-15 PROCEDURE — 1125F AMNT PAIN NOTED PAIN PRSNT: CPT | Performed by: ORTHOPAEDIC SURGERY

## 2025-05-15 PROCEDURE — G2211 COMPLEX E/M VISIT ADD ON: HCPCS | Performed by: ORTHOPAEDIC SURGERY

## 2025-05-15 PROCEDURE — 1160F RVW MEDS BY RX/DR IN RCRD: CPT | Performed by: ORTHOPAEDIC SURGERY

## 2025-05-15 RX ORDER — CELECOXIB 400 MG/1
400 CAPSULE ORAL ONCE
OUTPATIENT
Start: 2025-05-15 | End: 2025-05-15

## 2025-05-15 RX ORDER — CEFAZOLIN SODIUM 2 G/100ML
2 INJECTION, SOLUTION INTRAVENOUS ONCE
OUTPATIENT
Start: 2025-05-15 | End: 2025-05-15

## 2025-05-15 RX ORDER — ACETAMINOPHEN 325 MG/1
975 TABLET ORAL ONCE
OUTPATIENT
Start: 2025-05-15 | End: 2025-05-15

## 2025-05-15 ASSESSMENT — PAIN - FUNCTIONAL ASSESSMENT: PAIN_FUNCTIONAL_ASSESSMENT: 0-10

## 2025-05-15 ASSESSMENT — PAIN SCALES - GENERAL: PAINLEVEL_OUTOF10: 6

## 2025-05-15 NOTE — PROGRESS NOTES
This is a consultation from Dr. Trupti Greene MD for   Chief Complaint   Patient presents with    Right Hip - Pain    Left Hip - Pain       This is a 69 y.o. female who presents for evaluation of bilateral hip pain.  Seen in the past for her knee.  Patient states she has had hip pain over the years, this is a longstanding chronic problem for her.  She points to severe deep sharp stabbing pain in the anterior lateral and posterior hip.  She has a very difficult time walking because of this.  Right hip is worse than the left but both are very problematic.  They been getting worse lately and she is experience severe exacerbation of her right hip pain, it significantly impacting her function.  She has had extensive trial of nonsurgical management clued the use of anti-inflammatories physical therapy activity modification use of assistive devices cortisone injections.  Despite that she has severe and worsening right hip pain that affects her quality of life and activities of daily living    Physical Exam    There has been no interval change in this patient's past medical, surgical, medications, allergies, family history or social history since the most recent visit to a provider within our department. 14 point review of systems was performed, reviewed, and negative except for pertinent positives documented in the history of present illness.     Constitutional: well developed, well nourished female in no acute distress  Psychiatric: normal mood, appropriate affect  Eyes: sclera anicteric  HENT: normocephalic/atraumatic  CV: regular rate and rhythm   Respiratory: non labored breathing  Integumentary: no rash  Neurological: moves all extremities    Right hip exam: skin normal, no abrasions, wounds, or lacerations. nttp over greater trochanter. negative log roll, negative reji's test. flexion to 90 degrees with pain.  Significant pain with flexion abduction and external rotation, reproduction of severe hip and groin pain  with flexion adduction and internal rotation. neurovascularly intact distally        Imaging:  Xrays were ordered by me, they were reviewed and independently interpreted by me today, they show severe degenerative changes in the bilateral hips bone-on-bone arthritis erosion of the femoral head bilaterally.  Subchondral sclerosis osteophyte formation    Procedures      Impression/Plan: This is a 69 y.o. 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  Patient like to proceed with right total hip.    I had an extensive discussion with the patient regarding her condition and possible treatment options. Nonsurgical treatment for right hip arthritis includes activity modification, weight loss, use of a cane or other assistive device, pain medications and nonsteroidal anti-inflammatory medications, joint injections, and physical therapy. The patient has attempted non-surgical treatment for this condition during the past year for greater than 6 months and it has failed. We discussed the risks benefits and alternatives of total hip arthroplasty. Benefits of joint replacement include: Relief of pain, improvement of function, and improved quality of life. Alternatives include observation and watchful waiting, and the nonsurgical modalities noted above.     One specific concern for this patient is their obesity.  The patient has an elevated BMI which places them at increased risk of complications related to total joint surgery especially infection. The patient has attempted to lose weight by various methods and has had limited success, but is still trying .We discussed a weight loss program and recommended the patient make an appointment with our comprehensive weight loss clinic. The patient is aware of this increased risk, still like to proceed    Patient is at high risk for venous thromboemboli.  This is because of a history of previous venous  thromboemboli and/or a genetic condition that predisposes them to venous thromboemboli.  Discussed the risks with the patient.  We will plan for risk stratified VTE prophylaxis postoperatively.    1 specific issue for this patient is that the patient is on therapeutic anticoagulation.  The patient will need to come off that anticoagulation for surgery.  We discussed the risks and coming off anticoagulation as the patient may be susceptible to the condition for which they are treated with anticoagulation normally.  Additionally they will have to go back on therapeutic anticoagulation soon after surgery and this places them at increased risk for bleeding complications and infection.  We will do this in coordination with the doctor who is managing the patient's anticoagulation.  The patient is fully aware of the risks and would still like to proceed.        We discussed the risks of complications as well as the risks of morbidity and mortality related to surgical treatment with total joint replacement. We reviewed the fact that total joint replacement is major elective surgery with significant associated surgical and procedural risk factors as detailed below.   Risks include: Pain, blood loss, damage to nearby anatomical structures including but not limited to nerves or blood vessels muscles tendons and bone, failure surgery to ameliorate symptoms, persistence of pain surrounding the affected joint, mechanical failure of the prosthesis including but not limited to loosening of the prosthesis from bone ,breakage of the prosthesis and dislocation of the prosthesis, change in length or appearance of a limb, infection possibly necessitating further surgery, removal of the prosthesis, or limb amputation, the need for additional surgery for other reasons, blood clots, amputation, and death. No guarantees were implied or given.  All questions were answered and the patient voiced their understanding.     A complete set of  "surgical instructions was given to the patient. The patient was educated regarding preoperative nutrition, preparation of their home for postoperative rehabilitation, choosing a care partner, medical and dental clearance, the cessation of medications that can cause bleeding or presenting to risk for infection, chlorhexidine bath, nasal swab and decontamination, post operative follow up, and need for medical equipment. Patient was also educated regarding the possibility of same day surgery and related criteria and protocols. The patient will attend our joint class further education, and thereafter they will return for a preoperative visit. A presurgery education booklet was also given to the patient.     surgical plan: Right total hip  implants: [place]  approach: Direct anterior  DVT ppx Eliquis  drugs to stop Eliquis  allergy to abx none  post operative abx: ancef +/- vanc (per protocol)  special clearance needed cardiology    BMI Readings from Last 1 Encounters:   05/07/25 39.68 kg/m²      Lab Results   Component Value Date    CREATININE 0.69 04/01/2025     Tobacco Use: Medium Risk (5/15/2025)    Patient History     Smoking Tobacco Use: Former     Smokeless Tobacco Use: Never     Passive Exposure: Never      Computed MELD 3.0 unavailable. One or more values for this score either were not found within the given timeframe or did not fit some other criterion.  Computed MELD-Na unavailable. One or more values for this score either were not found within the given timeframe or did not fit some other criterion.       Lab Results   Component Value Date    HGBA1C 4.7 04/01/2025     No results found for: \"STAPHMRSASCR\"  "

## 2025-05-16 ENCOUNTER — APPOINTMENT (OUTPATIENT)
Dept: ORTHOPEDIC SURGERY | Facility: CLINIC | Age: 70
End: 2025-05-16
Payer: MEDICARE

## 2025-05-16 DIAGNOSIS — M48.062 LUMBAR STENOSIS WITH NEUROGENIC CLAUDICATION: ICD-10-CM

## 2025-05-16 DIAGNOSIS — M16.0 ARTHRITIS OF BOTH HIPS: Primary | ICD-10-CM

## 2025-05-16 LAB
BAND RESOLUTION: 400 BANDS
CHROM ANALY OVERALL INTERP-IMP: NORMAL
CHROMOSOME ANALYSIS CELLS ANALYZED: 20 CELLS
CHROMOSOME ANALYSIS CELLS IMAGED: 4 CELLS
CHROMOSOME ANALYSIS HYPERMODAL CELL COUNT: 0 CELLS
CHROMOSOME ANALYSIS HYPOMODAL CELL COUNT: 1 CELLS
CHROMOSOME ANALYSIS MODAL CHROMOSOME NO: 46 CHROMOSOMES
CHROMOSOME ANALYSIS STAINING METHOD: NORMAL
ELECTRONICALLY SIGNED BY CYTOGENETICS: NORMAL
KARYOTYP MAR: 2 CELLS
TOTAL CELLS COUNTED MAR: 20 CELLS

## 2025-05-16 PROCEDURE — 88264 CHROMOSOME ANALYSIS 20-25: CPT

## 2025-05-16 PROCEDURE — 99203 OFFICE O/P NEW LOW 30 MIN: CPT | Performed by: ORTHOPAEDIC SURGERY

## 2025-05-16 PROCEDURE — RXMED WILLOW AMBULATORY MEDICATION CHARGE

## 2025-05-16 PROCEDURE — 3048F LDL-C <100 MG/DL: CPT | Performed by: ORTHOPAEDIC SURGERY

## 2025-05-16 PROCEDURE — 3044F HG A1C LEVEL LT 7.0%: CPT | Performed by: ORTHOPAEDIC SURGERY

## 2025-05-16 RX ORDER — EZETIMIBE 10 MG/1
10 TABLET ORAL DAILY
Qty: 90 TABLET | Refills: 3 | Status: SHIPPED | OUTPATIENT
Start: 2025-05-16 | End: 2026-05-16

## 2025-05-16 NOTE — LETTER
"May 19, 2025     Trupti Greene MD  [address] [place] [address]  [address]  [place] [address]    Patient: Mariam Kaplan   YOB: 1955   Date of Visit: 5/16/2025       Dear Dr. Trupti Greene MD:    Thank you for referring Mariam Kaplan to me for evaluation. Below are my notes for this consultation.  If you have questions, please do not hesitate to call me. I look forward to following your patient along with you.       Sincerely,     Josh Diallo MD      CC: Gilmer Victor MD  ______________________________________________________________________________________    HPI:Katya Kaplan is a 69-year-old woman, who comes in today with a chief complaint of difficulty with walking\".  Patient has recently seen an orthopedic surgeon who is advised that she will need bilateral hip replacement performed in the future.  She has an upcoming right total hip arthroplasty planned.  Patient today is in a wheelchair.  She does have back pain when she attempts to ambulate but no specific radicular symptoms.      ROS:  Reviewed on EMR and patient intake sheet.    PMH/SH:  Reviewed on EMR and patient intake sheet.    Exam:  Physical Exam    Constitutional: Well appearing; no acute distress  Eyes: pupils are equal and round  Psych: normal affect  Respiratory: non-labored breathing  Cardiovascular: regular rate and rhythm  GI: non-distended abdomen  Musculoskeletal: Significant pain and limited range of motion of the hips bilaterally   neurologic: [4 -]/5 strength in the lower extremities bilaterally]; [negative] straight leg raise    Radiology:     X-rays demonstrate moderate facet arthrosis L4-S1.  Patient has severe arthritis of the hips bilaterally.    Diagnosis:    Hip arthritis; lumbar spondylosis    Assessment and Plan:   69-year-old woman with chronic back pain and severe osteoarthritis of the hips bilaterally.  At this time, her hip arthritis is her main problem.  I advised her to proceed with the hip replacements as suggested by " her orthopedic surgeon.  If she continues to have lumbar symptoms after the hip replacements, then I be happy to see her back at that time discuss any further potential treatment options from a lumbar spine standpoint.    At the end of the visit, I asked the patient if there were any further questions.  Although no further questions were provided at this time, I would be happy to see the patient back in the future to discuss any further questions or concerns.      Josh Diallo MD    Chief of Spine Surgery, [facility]  Director of Spine Service, [facility]  , Department of Orthopaedics  [employer] [facility] [employer]  [address] [place] [address]  P: [phone]  [email or web address]    This note was dictated with voice recognition software.  It has not been proofread for grammatical errors, typographical mistakes or other semantic inconsistencies.

## 2025-05-17 ENCOUNTER — PHARMACY VISIT (OUTPATIENT)
Dept: PHARMACY | Facility: CLINIC | Age: 70
End: 2025-05-17
Payer: MEDICARE

## 2025-05-19 NOTE — PROGRESS NOTES
"HPI:Katya Kaplan is a 69-year-old woman, who comes in today with a chief complaint of difficulty with walking\".  Patient has recently seen an orthopedic surgeon who is advised that she will need bilateral hip replacement performed in the future.  She has an upcoming right total hip arthroplasty planned.  Patient today is in a wheelchair.  She does have back pain when she attempts to ambulate but no specific radicular symptoms.      ROS:  Reviewed on EMR and patient intake sheet.    PMH/SH:  Reviewed on EMR and patient intake sheet.    Exam:  Physical Exam    Constitutional: Well appearing; no acute distress  Eyes: pupils are equal and round  Psych: normal affect  Respiratory: non-labored breathing  Cardiovascular: regular rate and rhythm  GI: non-distended abdomen  Musculoskeletal: Significant pain and limited range of motion of the hips bilaterally   neurologic: [4 -]/5 strength in the lower extremities bilaterally]; [negative] straight leg raise    Radiology:     X-rays demonstrate moderate facet arthrosis L4-S1.  Patient has severe arthritis of the hips bilaterally.    Diagnosis:    Hip arthritis; lumbar spondylosis    Assessment and Plan:   69-year-old woman with chronic back pain and severe osteoarthritis of the hips bilaterally.  At this time, her hip arthritis is her main problem.  I advised her to proceed with the hip replacements as suggested by her orthopedic surgeon.  If she continues to have lumbar symptoms after the hip replacements, then I be happy to see her back at that time discuss any further potential treatment options from a lumbar spine standpoint.    At the end of the visit, I asked the patient if there were any further questions.  Although no further questions were provided at this time, I would be happy to see the patient back in the future to discuss any further questions or concerns.      Josh Diallo MD    Chief of Spine Surgery, [facility]  Director of Spine Service, [facility]  , Department of Orthopaedics  [facility] [employer]  [address] [place] [address]  P: [phone]  [email or web address]    This note was dictated with voice recognition software.  It has not been proofread for grammatical errors, typographical mistakes or other semantic inconsistencies.

## 2025-05-22 ENCOUNTER — APPOINTMENT (OUTPATIENT)
Dept: ORTHOPEDIC SURGERY | Facility: CLINIC | Age: 70
End: 2025-05-22
Payer: MEDICARE

## 2025-05-23 NOTE — PREADMISSION SCREENING NOTE
Pre Surgery Discharge Planning :    Procedure:  right hip replacement      Date of procedure: 6/23/2025      Address you will be discharged to:  [facility] [address] [facility]    [place] [id number]     Care Partner: Erickson Kaplan and Andrade Kaplan      Current mobility status: uses a walker and wheelchair      Stairs into house: 1    Stairs inside house: 0    DME: david and walker      Joint Class: 5/22/2025      Same Day Surgery: Yes

## 2025-05-24 PROCEDURE — RXMED WILLOW AMBULATORY MEDICATION CHARGE

## 2025-05-28 ENCOUNTER — PHARMACY VISIT (OUTPATIENT)
Dept: PHARMACY | Facility: CLINIC | Age: 70
End: 2025-05-28
Payer: MEDICARE

## 2025-05-28 PROCEDURE — RXMED WILLOW AMBULATORY MEDICATION CHARGE

## 2025-05-29 ENCOUNTER — PHARMACY VISIT (OUTPATIENT)
Dept: PHARMACY | Facility: CLINIC | Age: 70
End: 2025-05-29
Payer: MEDICARE

## 2025-06-04 ENCOUNTER — HOSPITAL ENCOUNTER (OUTPATIENT)
Dept: RADIOLOGY | Facility: HOSPITAL | Age: 70
Discharge: HOME | End: 2025-06-04
Payer: MEDICARE

## 2025-06-04 ENCOUNTER — PRE-ADMISSION TESTING (OUTPATIENT)
Dept: PREADMISSION TESTING | Facility: HOSPITAL | Age: 70
End: 2025-06-04
Payer: MEDICARE

## 2025-06-04 VITALS
HEIGHT: 63 IN | WEIGHT: 215.61 LBS | HEART RATE: 72 BPM | TEMPERATURE: 96.8 F | DIASTOLIC BLOOD PRESSURE: 83 MMHG | RESPIRATION RATE: 18 BRPM | BODY MASS INDEX: 38.2 KG/M2 | OXYGEN SATURATION: 96 % | SYSTOLIC BLOOD PRESSURE: 119 MMHG

## 2025-06-04 DIAGNOSIS — Z01.818 PRE-OP EVALUATION: ICD-10-CM

## 2025-06-04 DIAGNOSIS — Z01.818 PREOPERATIVE EXAMINATION: Primary | ICD-10-CM

## 2025-06-04 DIAGNOSIS — M16.11 ARTHRITIS OF RIGHT HIP: ICD-10-CM

## 2025-06-04 LAB
ALBUMIN SERPL BCP-MCNC: 3.9 G/DL (ref 3.4–5)
ALP SERPL-CCNC: 55 U/L (ref 33–136)
ALT SERPL W P-5'-P-CCNC: 9 U/L (ref 7–45)
ANION GAP SERPL CALC-SCNC: 19 MMOL/L (ref 10–20)
AST SERPL W P-5'-P-CCNC: 10 U/L (ref 9–39)
BASOPHILS # BLD AUTO: 0.06 X10*3/UL (ref 0–0.1)
BASOPHILS NFR BLD AUTO: 0.5 %
BILIRUB SERPL-MCNC: 0.7 MG/DL (ref 0–1.2)
BUN SERPL-MCNC: 20 MG/DL (ref 6–23)
CALCIUM SERPL-MCNC: 8.8 MG/DL (ref 8.6–10.3)
CHLORIDE SERPL-SCNC: 102 MMOL/L (ref 98–107)
CO2 SERPL-SCNC: 20 MMOL/L (ref 21–32)
CREAT SERPL-MCNC: 0.74 MG/DL (ref 0.5–1.05)
EGFRCR SERPLBLD CKD-EPI 2021: 87 ML/MIN/1.73M*2
EOSINOPHIL # BLD AUTO: 0.19 X10*3/UL (ref 0–0.4)
EOSINOPHIL NFR BLD AUTO: 1.7 %
ERYTHROCYTE [DISTWIDTH] IN BLOOD BY AUTOMATED COUNT: 13.2 % (ref 11.5–14.5)
GLUCOSE SERPL-MCNC: 122 MG/DL (ref 74–99)
HCT VFR BLD AUTO: 47.5 % (ref 36–46)
HGB BLD-MCNC: 16.3 G/DL (ref 12–16)
IMM GRANULOCYTES # BLD AUTO: 0.06 X10*3/UL (ref 0–0.5)
IMM GRANULOCYTES NFR BLD AUTO: 0.5 % (ref 0–0.9)
LYMPHOCYTES # BLD AUTO: 1.71 X10*3/UL (ref 0.8–3)
LYMPHOCYTES NFR BLD AUTO: 15.5 %
MCH RBC QN AUTO: 31.4 PG (ref 26–34)
MCHC RBC AUTO-ENTMCNC: 34.3 G/DL (ref 32–36)
MCV RBC AUTO: 92 FL (ref 80–100)
MONOCYTES # BLD AUTO: 0.79 X10*3/UL (ref 0.05–0.8)
MONOCYTES NFR BLD AUTO: 7.1 %
NEUTROPHILS # BLD AUTO: 8.24 X10*3/UL (ref 1.6–5.5)
NEUTROPHILS NFR BLD AUTO: 74.7 %
NRBC BLD-RTO: 0 /100 WBCS (ref 0–0)
PLATELET # BLD AUTO: 232 X10*3/UL (ref 150–450)
POTASSIUM SERPL-SCNC: 4.1 MMOL/L (ref 3.5–5.3)
PROT SERPL-MCNC: 6.1 G/DL (ref 6.4–8.2)
RBC # BLD AUTO: 5.19 X10*6/UL (ref 4–5.2)
SODIUM SERPL-SCNC: 137 MMOL/L (ref 136–145)
WBC # BLD AUTO: 11.1 X10*3/UL (ref 4.4–11.3)

## 2025-06-04 PROCEDURE — 99204 OFFICE O/P NEW MOD 45 MIN: CPT | Performed by: NURSE PRACTITIONER

## 2025-06-04 PROCEDURE — 85025 COMPLETE CBC W/AUTO DIFF WBC: CPT

## 2025-06-04 PROCEDURE — 72100 X-RAY EXAM L-S SPINE 2/3 VWS: CPT

## 2025-06-04 PROCEDURE — 72100 X-RAY EXAM L-S SPINE 2/3 VWS: CPT | Performed by: RADIOLOGY

## 2025-06-04 PROCEDURE — 36415 COLL VENOUS BLD VENIPUNCTURE: CPT

## 2025-06-04 PROCEDURE — 80053 COMPREHEN METABOLIC PANEL: CPT

## 2025-06-04 PROCEDURE — 87081 CULTURE SCREEN ONLY: CPT | Mod: GEALAB

## 2025-06-04 PROCEDURE — 93005 ELECTROCARDIOGRAM TRACING: CPT

## 2025-06-04 RX ORDER — FOLIC ACID 1 MG/1
TABLET ORAL DAILY
COMMUNITY

## 2025-06-04 RX ORDER — CHLORHEXIDINE GLUCONATE ORAL RINSE 1.2 MG/ML
15 SOLUTION DENTAL DAILY
Qty: 473 ML | Refills: 0 | Status: SHIPPED | OUTPATIENT
Start: 2025-06-04 | End: 2025-06-05 | Stop reason: SDUPTHER

## 2025-06-04 RX ORDER — CHLORHEXIDINE GLUCONATE 40 MG/ML
1 SOLUTION TOPICAL DAILY
Start: 2025-06-04 | End: 2025-06-09

## 2025-06-04 RX ORDER — LANOLIN ALCOHOL/MO/W.PET/CERES
1000 CREAM (GRAM) TOPICAL DAILY
COMMUNITY

## 2025-06-04 ASSESSMENT — ENCOUNTER SYMPTOMS
CONSTITUTIONAL NEGATIVE: 1
RESPIRATORY NEGATIVE: 1
NEUROLOGICAL NEGATIVE: 1
GASTROINTESTINAL NEGATIVE: 1
CARDIOVASCULAR NEGATIVE: 1
NECK NEGATIVE: 1

## 2025-06-04 ASSESSMENT — DUKE ACTIVITY SCORE INDEX (DASI)
TOTAL_SCORE: 7.2
CAN YOU TAKE CARE OF YOURSELF (EAT, DRESS, BATHE, OR USE TOILET): YES
CAN YOU PARTICIPATE IN STRENOUS SPORTS LIKE SWIMMING, SINGLES TENNIS, FOOTBALL, BASKETBALL, OR SKIING: NO
DASI METS SCORE: 3.6
CAN YOU WALK A BLOCK OR TWO ON LEVEL GROUND: NO
CAN YOU WALK INDOORS, SUCH AS AROUND YOUR HOUSE: YES
CAN YOU RUN A SHORT DISTANCE: NO
CAN YOU HAVE SEXUAL RELATIONS: NO
CAN YOU DO MODERATE WORK AROUND THE HOUSE LIKE VACUUMING, SWEEPING FLOORS OR CARRYING GROCERIES: NO
CAN YOU DO LIGHT WORK AROUND THE HOUSE LIKE DUSTING OR WASHING DISHES: YES
CAN YOU DO HEAVY WORK AROUND THE HOUSE LIKE SCRUBBING FLOORS OR LIFTING AND MOVING HEAVY FURNITURE: NO
CAN YOU DO YARD WORK LIKE RAKING LEAVES, WEEDING OR PUSHING A MOWER: NO
CAN YOU PARTICIPATE IN MODERATE RECREATIONAL ACTIVITIES LIKE GOLF, BOWLING, DANCING, DOUBLES TENNIS OR THROWING A BASEBALL OR FOOTBALL: NO
CAN YOU CLIMB A FLIGHT OF STAIRS OR WALK UP A HILL: NO

## 2025-06-04 ASSESSMENT — LIFESTYLE VARIABLES: SMOKING_STATUS: NONSMOKER

## 2025-06-04 ASSESSMENT — PAIN - FUNCTIONAL ASSESSMENT: PAIN_FUNCTIONAL_ASSESSMENT: 0-10

## 2025-06-04 ASSESSMENT — PAIN SCALES - GENERAL: PAINLEVEL_OUTOF10: 0 - NO PAIN

## 2025-06-04 NOTE — H&P (VIEW-ONLY)
"CPM/PAT Evaluation       Name: Katya Kaplan (Katya Kaplan \"Mariam\")  /Age: 1955/70 y.o.     Visit Type:   In-Person       Chief Complaint: Arthritis of right hip      HPI 69 y/o female scheduled for total hip arthroplasty-right on 2025 with  Dr. De Los Santos secondary to arthritis of right hip.  PMHX includes HTN, CAD s/p PCI to RCA (), PE, T2DM, COPD.  PAT is consulted today for perioperative risk stratification and optimization.      Medical History[1]    Surgical History[2]    Patient  reports that she is not currently sexually active and has had partner(s) who are male.    Family History[3]    Allergies[4]    Prior to Admission medications    Medication Sig Start Date End Date Taking? Authorizing Provider   albuterol 2.5 mg /3 mL (0.083 %) nebulizer solution Use 1 vial (2.5 mg) by nebulization every 6 hours if needed for wheezing. 24   TRISTA Whitney   albuterol 90 mcg/actuation inhaler INHALE 2 PUFFS BY MOUTH FOUR TIMES A DAY AS NEEDED FOR WHEEZING 25  Trupti Greene MD   apixaban (Eliquis) 5 mg tablet TAKE 1 TABLET BY MOUTH TWO TIMES A DAY 25  Trupti Greene MD   blood-glucose sensor (FreeStyle Vernell 3 Sensor) device Change sensor every 15 days as directed - Please dispense Vernell 3 plus sensors 24   DANGELO WhitneyCNP   DULoxetine (Cymbalta) 30 mg DR capsule Take 1 capsule (30 mg) by mouth once daily. Do not crush or chew. 25   Gilmer Victor MD   evolocumab (Repatha SureClick) 140 mg/mL injection Inject 1 mL (140 mg) under the skin every 14 (fourteen) days. 4/3/25 3/29/26  Trupti Greene MD   ezetimibe (Zetia) 10 mg tablet Take 1 tablet (10 mg) by mouth once daily. 25  Opal Esteban MD   fluticasone furoate-vilanteroL (Breo Ellipta) 200-25 mcg/dose inhaler INHALE 1 PUFF BY MOUTH ONE TIME DAILY 25  Trupti Greene MD   gabapentin (Neurontin) 300 mg capsule Take 2 capsules (600 mg) by mouth 3 times a day. 3/18/25 " "7/16/25  Gilmer Victor MD   insulin glargine (Lantus Solostar U-100 Insulin) 100 unit/mL (3 mL) pen Inject 10 Units under the skin once daily at bedtime. Discard pen 28 days after use. 3/26/25 1/20/26  Trupti Greene MD   metoprolol succinate XL (Toprol-XL) 50 mg 24 hr tablet Take 1 tablet (50 mg) by mouth once daily. 4/5/25 3/31/26  Opal Esteban MD   omeprazole (PriLOSEC) 40 mg DR capsule Take 1 capsule (40 mg) by mouth once daily. 12/18/24   Janna RAY Biddell, APRN-CNP   pen needle, diabetic 32 gauge x 5/32\" needle Use daily with insulin injection. 8/16/24   Janna G Biddell, APRN-CNP   ranolazine (Ranexa) 500 mg 12 hr tablet TAKE 1 TABLET BY MOUTH TWO TIMES A DAY 7/9/24 7/16/25  Opal Esteban MD   spironolactone (Aldactone) 25 mg tablet Take 1 tablet (25 mg) by mouth once daily. 12/18/24   Janna G Biddell, APRN-CNP   tirzepatide (Mounjaro) 10 mg/0.5 mL pen injector Inject 10 mg under the skin every 7 days. 3/26/25 9/10/25  Trupti Greene MD        PAT ROS:   Constitutional:   neg    Neuro/Psych:   neg    Eyes:    use of corrective lenses  Ears:   neg    Nose:   Mouth:   Throat:   Neck:   neg    Cardio:   neg    Respiratory:   neg    Endocrine:   GI:   neg    :   neg    Musculoskeletal:    Rollator for assistance  Hematologic:   neg    Skin:      Physical Exam  Constitutional:       Appearance: Normal appearance. She is obese.   HENT:      Head: Normocephalic and atraumatic.      Mouth/Throat:      Mouth: Mucous membranes are moist.      Pharynx: Oropharynx is clear.   Eyes:      Extraocular Movements: Extraocular movements intact.   Cardiovascular:      Rate and Rhythm: Normal rate and regular rhythm.   Pulmonary:      Effort: Pulmonary effort is normal.      Breath sounds: Normal breath sounds.   Abdominal:      General: Bowel sounds are normal.      Palpations: Abdomen is soft.   Musculoskeletal:         General: Normal range of motion.      Cervical back: Normal range of motion.   Skin:     General: " "Skin is warm and dry.   Neurological:      General: No focal deficit present.      Mental Status: She is alert and oriented to person, place, and time.   Psychiatric:         Mood and Affect: Mood normal.         Behavior: Behavior normal.          Airway    Testing/Diagnostic:     Patient Specialist/PCP:     Visit Vitals  /83   Pulse 72   Temp 36 °C (96.8 °F) (Temporal)   Resp 18   Ht 1.6 m (5' 3\")   Wt 97.8 kg (215 lb 9.8 oz)   SpO2 96%   BMI 38.19 kg/m²   OB Status Postmenopausal   Smoking Status Former   BSA 2.08 m²       DASI Risk Score      Flowsheet Row Pre-Admission Testing from 6/4/2025 in [facility] Questionnaire Series Submission from 5/15/2025 in [facility] OR with Generic Provider Leanna   Can you take care of yourself (eat, dress, bathe, or use toilet)?  2.75 filed at 06/04/2025 1323 2.75  filed at 05/15/2025 1553   Can you walk indoors, such as around your house? 1.75 filed at 06/04/2025 1323 1.75  filed at 05/15/2025 1553   Can you walk a block or two on level ground?  0 filed at 06/04/2025 1323 0  filed at 05/15/2025 1553   Can you climb a flight of stairs or walk up a hill? 0 filed at 06/04/2025 1323 0  filed at 05/15/2025 1553   Can you run a short distance? 0 filed at 06/04/2025 1323 0  filed at 05/15/2025 1553   Can you do light work around the house like dusting or washing dishes? 2.7 filed at 06/04/2025 1323 2.7  filed at 05/15/2025 1553   Can you do moderate work around the house like vacuuming, sweeping floors or carrying groceries? 0 filed at 06/04/2025 1323 0  filed at 05/15/2025 1553   Can you do heavy work around the house like scrubbing floors or lifting and moving heavy furniture?  0 filed at 06/04/2025 1323 0  filed at 05/15/2025 1553   Can you do yard work like raking leaves, weeding or pushing a mower? 0 filed at 06/04/2025 1323 0  filed at 05/15/2025 1553   Can you have sexual relations? 0 filed at 06/04/2025 1323 0  filed at 05/15/2025 1553   Can " you participate in moderate recreational activities like golf, bowling, dancing, doubles tennis or throwing a baseball or football? 0 filed at 06/04/2025 1323 0  filed at 05/15/2025 1553   Can you participate in strenous sports like swimming, singles tennis, football, basketball, or skiing? 0 filed at 06/04/2025 1323 0  filed at 05/15/2025 1553   DASI SCORE 7.2 filed at 06/04/2025 1323 7.2  filed at 05/15/2025 1553   METS Score (Will be calculated only when all the questions are answered) 3.6 filed at 06/04/2025 1323 3.6  filed at 05/15/2025 1553          Capmarcoi DVT Assessment      Flowsheet Row Pre-Admission Testing from 6/4/2025 in [facility]   DVT Score (IF A SCORE IS NOT CALCULATING, MUST SELECT A BMI TO COMPLETE) 15 filed at 06/04/2025 1402   Medical Factors History of DVT/PE filed at 06/04/2025 1402   Surgical Factors Major surgery planned, including arthroscopic and laproscopic (1-2 hours), Elective major lower extremity arthroplasty filed at 06/04/2025 1402   BMI (BMI MUST BE CHOSEN) 31-40 (Obesity) filed at 06/04/2025 1402          Modified Frailty Index    No data to display       [id number] Stroke Risk Points  Current as of just now        5 0 to 9 Points:      Last Change:           The [id number] risk score (Lip RY, et al. 2009. © 2010 American [employer]) quantifies the risk of stroke for a patient with atrial fibrillation. For patients without atrial fibrillation or under the age of 18 this score appears as N/A. Higher score values generally indicate higher risk of stroke.          Points Metrics   0 Has Congestive Heart Failure:  No     Patients with congestive heart failure get 1 point.    Current as of just now   1 Has Hypertension:  Yes     Patients with hypertension get 1 point.    Current as of just now   1 Age:  70     Patients 65 to 74 years old get 1 point, or patients 75 years and older get 2 points.    Current as of just now   1 Has Diabetes:  Yes      Patients with diabetes get 1 point.    Current as of just now   0 Had Stroke:  No  Had TIA:  No  Had Thromboembolism:  No     Patients who have had a stroke, TIA, or thromboembolism get 2 points.    Current as of just now   1 Has Vascular Disease:  Yes     Patients with vascular disease get 1 point.    Current as of just now   1 Clinically Relevant Sex:  Female     Patients with a clinically relevant sex of Female get 1 point.    Current as of just now             Revised Cardiac Risk Index      Flowsheet Row Pre-Admission Testing from 6/4/2025 in [facility]   High-Risk Surgery (Intraperitoneal, Intrathoracic,Suprainguinal vascular) 0 filed at 06/04/2025 1412   History of ischemic heart disease (History of MI, History of positive exercuse test, Current chest paint considered due to myocardial ischemia, Use of nitrate therapy, ECG with pathological Q Waves) 0 filed at 06/04/2025 1412   History of congestive heart failure (pulmonary edemia, bilateral rales or S3 gallop, Paroxysmal nocturnal dyspnea, CXR showing pulmonary vascular redistribution) 0 filed at 06/04/2025 1412   History of cerebrovascular disease (Prior TIA or stroke) 0 filed at 06/04/2025 1412   Pre-operative insulin treatment 1 filed at 06/04/2025 1412   Pre-operative creatinine>2 mg/dl 0 filed at 06/04/2025 1412   Revised Cardiac Risk Calculator 1 filed at 06/04/2025 1412          Apfel Simplified Score      Flowsheet Row Pre-Admission Testing from 6/4/2025 in [facility]   Smoking status 1 filed at 06/04/2025 1413   History of motion sickness or PONV  0 filed at 06/04/2025 1413   Use of postoperative opioids 1 filed at 06/04/2025 1413   Gender - Female 1=Yes filed at 06/04/2025 1413   Apfel Simplified Score Calculator 3 filed at 06/04/2025 1413          Risk Analysis Index Results This Encounter    No data found in the last 10 encounters.       Stop Bang Score      Flowsheet Row Pre-Admission Testing from 6/4/2025 in   [facility] Questionnaire Series Submission from 5/15/2025 in [facility] OR with Generic Provider Leanna   Do you snore loudly? 0 filed at 06/04/2025 1324 0  filed at 05/15/2025 1553   Do you often feel tired or fatigued after your sleep? 0 filed at 06/04/2025 1324 0  filed at 05/15/2025 1553   Has anyone ever observed you stop breathing in your sleep? 0 filed at 06/04/2025 1324 0  filed at 05/15/2025 1553   Do you have or are you being treated for high blood pressure? 1 filed at 06/04/2025 1324 1  filed at 05/15/2025 1553   Recent BMI (Calculated) 39.7 filed at 06/04/2025 1324 39.7  filed at 05/15/2025 1553   Is BMI greater than 35 kg/m2? 1=Yes filed at 06/04/2025 1324 1=Yes  filed at 05/15/2025 1553   Age older than 50 years old? 1=Yes filed at 06/04/2025 1324 1=Yes  filed at 05/15/2025 1553   Is your neck circumference greater than 17 inches (Male) or 16 inches (Female)? 0 filed at 06/04/2025 1324 --   Gender - Male 0=No filed at 06/04/2025 1324 0=No  filed at 05/15/2025 1553   STOP-BANG Total Score 3 filed at 06/04/2025 1324 --          Prodigy: High Risk  Total Score: 12              Prodigy Age Score           ARISCAT Score for Postoperative Pulmonary Complications      Flowsheet Row Pre-Admission Testing from 6/4/2025 in [facility]   Age Calculated Score 3 filed at 06/04/2025 1413   Preoperative SpO2 0 filed at 06/04/2025 1413   Respiratory infection in the last month Either upper or lower (i.e., URI, bronchitis, pneumonia), with fever and antibiotic treatment 0 filed at 06/04/2025 1413   Preoperative anemia (Hgb less than 10 g/dl) 0 filed at 06/04/2025 1413   Surgical incision  0 filed at 06/04/2025 1413   Duration of surgery  0 filed at 06/04/2025 1413   Emergency Procedure  0 filed at 06/04/2025 1413   ARISCAT Total Score  3 filed at 06/04/2025 1413          Kailash Perioperative Risk for Myocardial Infarction or Cardiac Arrest (LUZ MARIA)    No data to display              Assessment and Plan:       HPI 69 y/o female scheduled for total hip arthroplasty-right on 6/23/2025 with  Dr. De Los Santos secondary to arthritis of right hip.  PMHX includes HTN, CAD s/p PCI to RCA (2021), PE, T2DM, COPD.  PAT is consulted today for perioperative risk stratification and optimization.    Seen by PCP 5/7/2025    Neuro:  No neurologic diagnosis, however, the patient is at increased risk for perioperative delirium secondary to  age, decreased functional status, polypharmacy  Patient is at increased risk for perioperative CVA secondary to  Afib, perioperative interruption of anticoagulant, HTN, DM, increased age    HEENT:  No HEENT diagnosis or significant findings on chart review or clinical presentation and evaluation. No further preoperative testing/intervention indicated at this time.    Cardiovascular:  Follows with Dr. Esteban and was seen on 12/19/2025 for pAfib  ECHO 4/2022:  EF 50-55%; ABL basal inferior segment; IDD; mod reduced RV fx  Continue metoprolol and ranexa  Holding Zetia 24 hrs prior to procedure   Holding Eliquis 3 days prior to procedure  METS: 3.6  RCRI: 0 points, 3.9%  risk for postoperative MACE     Pulmonary:  Unprovoked PE 2020  Inhalers as needed  Stop Bang score is 3 placing patient at intermediate risk for LATOYA  PRODIGY: High risk for opioid induced respiratory depression  Pumonary education discussed, patient also provided deep breathing exerciseswith educational handout    Renal:   No renal diagnosis, however patient is at increase risk for perioperative renal complications secondary to  Age equal to or greater than 56, BMI equal to or greater than 30, HTN, diabetes, use of an ace, arb, or NSAID      Endocrine:  T2DM - Managed by PCO   Holding Mounjaro 24 hrs prior to procedure  Take 1/2 dose of glargine evening prior to procedure    Hematologic:  No hematologic diagnosis, however patient is at an increased risk for DVT  Caprini Score 15, patient at High risk for  perioperative DVT.  Patient provided with VTE education/handout.    Gastrointestinal:   No GI diagnosis or significant findings on chart review or clinical presentation and evaluation.   Apfel 3    Orthopedic surgery  Seen by Dr. De Los Santos on 5/15/2025 for preop of right hip  Date 4/1/2025 A1c: 4.7  Plan for total right hip arthroplasty    Infectious disease:   No infectious diagnosis or significant findings on chart review or clinical presentation and evaluation.   Prescription provided for CHG body wash and dental rinse. CHG use instructions reviewed and provided to patient.  Staph screen collected    Musculoskeletal:   No diagnosis or significant findings on chart review or clinical presentation and evaluation.     Anesthesia/Airway:  No anesthesia complications      Medication instructions and NPO guidelines reviewed with the patient.  All questions or concerns discussed and addressed.      Labs and EKG ordered                  [1]   Past Medical History:  Diagnosis Date    Acute non-ST elevation myocardial infarction (NSTEMI) (Multi) 05/19/2024    Angina pectoris 10/11/2023    Arthritis     Atrial fibrillation (Multi) 10/11/2023    Benign essential hypertension 09/04/2019    Chest discomfort 10/11/2023    Chronic obstructive pulmonary disease (Multi) 12/30/2018    CKD (chronic kidney disease) 09/09/2022    Coronary arteriosclerosis 10/11/2023    DVT (deep venous thrombosis) (Multi)     Edema 10/11/2023    Electrocardiogram abnormal 10/11/2023    Endometrial cancer (Multi) 2015    HTN (hypertension) 12/30/2018    Mixed hyperlipidemia 12/30/2018    OA (osteoarthritis)     severe    Obesity     PE (pulmonary thromboembolism) (Multi) 10/20/2021    Polycythemia     Presence of coronary angioplasty implant and graft 04/08/2022    Renal cyst     Spinal stenosis     Type 2 diabetes mellitus without complications 12/11/2018   [2]   Past Surgical History:  Procedure Laterality Date    CARDIAC CATHETERIZATION  04/2022     stent    CHOLECYSTECTOMY  12/2016    CORONARY STENT PLACEMENT  04/22    FL GUIDED ASPIRATION OR INJECTION LARGE JOINT LEFT Left 12/18/2023    FL GUIDED ASPIRATION OR INJECTION LARGE JOINT LEFT 12/18/2023 Ralph Triplett MD [facility]    FL GUIDED ASPIRATION OR INJECTION LARGE JOINT RIGHT Right 12/14/2023    FL GUIDED ASPIRATION OR INJECTION LARGE JOINT RIGHT 12/14/2023 Ralph Triplett MD [facility]    HYSTERECTOMY  08/2015    JOINT REPLACEMENT Right 10/2010    TKR   [3]   Family History  Problem Relation Name Age of Onset    Arthritis Mother Betty Villeda     Diabetes Mother Betty Villeda     Heart disease Mother Betty Cejatrong     Hypertension Mother Betty Dallasg     Miscarriages / Stillbirths Mother Betty Cejatrong     Arthritis Father Vijay Dallasg     Cancer Father Vijay Villeda     Diabetes Father Vijay Villeda     Heart disease Father Vijay Villeda     Stroke Maternal Grandmother Inés Licea     Arthritis Sister Mera Shawmut     Cancer Sister Mera Paige     Diabetes Sister Mera Shawmut     Hypertension Sister Mera Shawmut     Ovarian cancer Sister Mera Shawmut     Cancer Mother's Sister Yahaira De Oliveira     Stroke Mother's Sister Shanda Lundberg    [4]   Allergies  Allergen Reactions    Penicillins Swelling

## 2025-06-04 NOTE — CPM/PAT H&P
"CPM/PAT Evaluation       Name: Katya Kaplan (Katya Kaplan \"Mariam\")  /Age: 1955/70 y.o.     Visit Type:   In-Person       Chief Complaint: Arthritis of right hip      HPI 69 y/o female scheduled for total hip arthroplasty-right on 2025 with  Dr. D eLos Santos secondary to arthritis of right hip.  PMHX includes HTN, CAD s/p PCI to RCA (), PE, T2DM, COPD.  PAT is consulted today for perioperative risk stratification and optimization.      Medical History[1]    Surgical History[2]    Patient  reports that she is not currently sexually active and has had partner(s) who are male.    Family History[3]    Allergies[4]    Prior to Admission medications    Medication Sig Start Date End Date Taking? Authorizing Provider   albuterol 2.5 mg /3 mL (0.083 %) nebulizer solution Use 1 vial (2.5 mg) by nebulization every 6 hours if needed for wheezing. 24   TRISTA Whitney   albuterol 90 mcg/actuation inhaler INHALE 2 PUFFS BY MOUTH FOUR TIMES A DAY AS NEEDED FOR WHEEZING 25  Trupti Greene MD   apixaban (Eliquis) 5 mg tablet TAKE 1 TABLET BY MOUTH TWO TIMES A DAY 25  Trupti Greene MD   blood-glucose sensor (FreeStyle Vernell 3 Sensor) device Change sensor every 15 days as directed - Please dispense Vernell 3 plus sensors 24   DANGELO WhitneyCNP   DULoxetine (Cymbalta) 30 mg DR capsule Take 1 capsule (30 mg) by mouth once daily. Do not crush or chew. 25   Gilmer Victor MD   evolocumab (Repatha SureClick) 140 mg/mL injection Inject 1 mL (140 mg) under the skin every 14 (fourteen) days. 4/3/25 3/29/26  Trupti Greene MD   ezetimibe (Zetia) 10 mg tablet Take 1 tablet (10 mg) by mouth once daily. 25  Opal Esteban MD   fluticasone furoate-vilanteroL (Breo Ellipta) 200-25 mcg/dose inhaler INHALE 1 PUFF BY MOUTH ONE TIME DAILY 25  Trupti Greene MD   gabapentin (Neurontin) 300 mg capsule Take 2 capsules (600 mg) by mouth 3 times a day. 3/18/25 " "7/16/25  Gilmer Victor MD   insulin glargine (Lantus Solostar U-100 Insulin) 100 unit/mL (3 mL) pen Inject 10 Units under the skin once daily at bedtime. Discard pen 28 days after use. 3/26/25 1/20/26  Trupti Greene MD   metoprolol succinate XL (Toprol-XL) 50 mg 24 hr tablet Take 1 tablet (50 mg) by mouth once daily. 4/5/25 3/31/26  Opal Esteban MD   omeprazole (PriLOSEC) 40 mg DR capsule Take 1 capsule (40 mg) by mouth once daily. 12/18/24   Janna RYA Biddell, APRN-CNP   pen needle, diabetic 32 gauge x 5/32\" needle Use daily with insulin injection. 8/16/24   Janna G Biddell, APRN-CNP   ranolazine (Ranexa) 500 mg 12 hr tablet TAKE 1 TABLET BY MOUTH TWO TIMES A DAY 7/9/24 7/16/25  Opal Esteban MD   spironolactone (Aldactone) 25 mg tablet Take 1 tablet (25 mg) by mouth once daily. 12/18/24   Janna G Biddell, APRN-CNP   tirzepatide (Mounjaro) 10 mg/0.5 mL pen injector Inject 10 mg under the skin every 7 days. 3/26/25 9/10/25  Trupti Greene MD        PAT ROS:   Constitutional:   neg    Neuro/Psych:   neg    Eyes:    use of corrective lenses  Ears:   neg    Nose:   Mouth:   Throat:   Neck:   neg    Cardio:   neg    Respiratory:   neg    Endocrine:   GI:   neg    :   neg    Musculoskeletal:    Rollator for assistance  Hematologic:   neg    Skin:      Physical Exam  Constitutional:       Appearance: Normal appearance. She is obese.   HENT:      Head: Normocephalic and atraumatic.      Mouth/Throat:      Mouth: Mucous membranes are moist.      Pharynx: Oropharynx is clear.   Eyes:      Extraocular Movements: Extraocular movements intact.   Cardiovascular:      Rate and Rhythm: Normal rate and regular rhythm.   Pulmonary:      Effort: Pulmonary effort is normal.      Breath sounds: Normal breath sounds.   Abdominal:      General: Bowel sounds are normal.      Palpations: Abdomen is soft.   Musculoskeletal:         General: Normal range of motion.      Cervical back: Normal range of motion.   Skin:     General: " "Skin is warm and dry.   Neurological:      General: No focal deficit present.      Mental Status: She is alert and oriented to person, place, and time.   Psychiatric:         Mood and Affect: Mood normal.         Behavior: Behavior normal.          Airway    Testing/Diagnostic:     Patient Specialist/PCP:     Visit Vitals  /83   Pulse 72   Temp 36 °C (96.8 °F) (Temporal)   Resp 18   Ht 1.6 m (5' 3\")   Wt 97.8 kg (215 lb 9.8 oz)   SpO2 96%   BMI 38.19 kg/m²   OB Status Postmenopausal   Smoking Status Former   BSA 2.08 m²       DASI Risk Score      Flowsheet Row Pre-Admission Testing from 6/4/2025 in [facility] Questionnaire Series Submission from 5/15/2025 in [facility] OR with Generic Provider Leanna   Can you take care of yourself (eat, dress, bathe, or use toilet)?  2.75 filed at 06/04/2025 1323 2.75  filed at 05/15/2025 1553   Can you walk indoors, such as around your house? 1.75 filed at 06/04/2025 1323 1.75  filed at 05/15/2025 1553   Can you walk a block or two on level ground?  0 filed at 06/04/2025 1323 0  filed at 05/15/2025 1553   Can you climb a flight of stairs or walk up a hill? 0 filed at 06/04/2025 1323 0  filed at 05/15/2025 1553   Can you run a short distance? 0 filed at 06/04/2025 1323 0  filed at 05/15/2025 1553   Can you do light work around the house like dusting or washing dishes? 2.7 filed at 06/04/2025 1323 2.7  filed at 05/15/2025 1553   Can you do moderate work around the house like vacuuming, sweeping floors or carrying groceries? 0 filed at 06/04/2025 1323 0  filed at 05/15/2025 1553   Can you do heavy work around the house like scrubbing floors or lifting and moving heavy furniture?  0 filed at 06/04/2025 1323 0  filed at 05/15/2025 1553   Can you do yard work like raking leaves, weeding or pushing a mower? 0 filed at 06/04/2025 1323 0  filed at 05/15/2025 1553   Can you have sexual relations? 0 filed at 06/04/2025 1323 0  filed at 05/15/2025 1553   Can " you participate in moderate recreational activities like golf, bowling, dancing, doubles tennis or throwing a baseball or football? 0 filed at 06/04/2025 1323 0  filed at 05/15/2025 1553   Can you participate in strenous sports like swimming, singles tennis, football, basketball, or skiing? 0 filed at 06/04/2025 1323 0  filed at 05/15/2025 1553   DASI SCORE 7.2 filed at 06/04/2025 1323 7.2  filed at 05/15/2025 1553   METS Score (Will be calculated only when all the questions are answered) 3.6 filed at 06/04/2025 1323 3.6  filed at 05/15/2025 1553          Capmarcoi DVT Assessment      Flowsheet Row Pre-Admission Testing from 6/4/2025 in [facility]   DVT Score (IF A SCORE IS NOT CALCULATING, MUST SELECT A BMI TO COMPLETE) 15 filed at 06/04/2025 1402   Medical Factors History of DVT/PE filed at 06/04/2025 1402   Surgical Factors Major surgery planned, including arthroscopic and laproscopic (1-2 hours), Elective major lower extremity arthroplasty filed at 06/04/2025 1402   BMI (BMI MUST BE CHOSEN) 31-40 (Obesity) filed at 06/04/2025 1402          Modified Frailty Index    No data to display       [id number] Stroke Risk Points  Current as of just now        5 0 to 9 Points:      Last Change:           The [id number] risk score (Lip RY, et al. 2009. © 2010 [employer]) quantifies the risk of stroke for a patient with atrial fibrillation. For patients without atrial fibrillation or under the age of 18 this score appears as N/A. Higher score values generally indicate higher risk of stroke.          Points Metrics   0 Has Congestive Heart Failure:  No     Patients with congestive heart failure get 1 point.    Current as of just now   1 Has Hypertension:  Yes     Patients with hypertension get 1 point.    Current as of just now   1 Age:  70     Patients 65 to 74 years old get 1 point, or patients 75 years and older get 2 points.    Current as of just now   1 Has Diabetes:  Yes      Patients with diabetes get 1 point.    Current as of just now   0 Had Stroke:  No  Had TIA:  No  Had Thromboembolism:  No     Patients who have had a stroke, TIA, or thromboembolism get 2 points.    Current as of just now   1 Has Vascular Disease:  Yes     Patients with vascular disease get 1 point.    Current as of just now   1 Clinically Relevant Sex:  Female     Patients with a clinically relevant sex of Female get 1 point.    Current as of just now             Revised Cardiac Risk Index      Flowsheet Row Pre-Admission Testing from 6/4/2025 in [facility]   High-Risk Surgery (Intraperitoneal, Intrathoracic,Suprainguinal vascular) 0 filed at 06/04/2025 1412   History of ischemic heart disease (History of MI, History of positive exercuse test, Current chest paint considered due to myocardial ischemia, Use of nitrate therapy, ECG with pathological Q Waves) 0 filed at 06/04/2025 1412   History of congestive heart failure (pulmonary edemia, bilateral rales or S3 gallop, Paroxysmal nocturnal dyspnea, CXR showing pulmonary vascular redistribution) 0 filed at 06/04/2025 1412   History of cerebrovascular disease (Prior TIA or stroke) 0 filed at 06/04/2025 1412   Pre-operative insulin treatment 1 filed at 06/04/2025 1412   Pre-operative creatinine>2 mg/dl 0 filed at 06/04/2025 1412   Revised Cardiac Risk Calculator 1 filed at 06/04/2025 1412          Apfel Simplified Score      Flowsheet Row Pre-Admission Testing from 6/4/2025 in [facility]   Smoking status 1 filed at 06/04/2025 1413   History of motion sickness or PONV  0 filed at 06/04/2025 1413   Use of postoperative opioids 1 filed at 06/04/2025 1413   Gender - Female 1=Yes filed at 06/04/2025 1413   Apfel Simplified Score Calculator 3 filed at 06/04/2025 1413          Risk Analysis Index Results This Encounter    No data found in the last 10 encounters.       Stop Bang Score      Flowsheet Row Pre-Admission Testing from 6/4/2025 in   [facility] Questionnaire Series Submission from 5/15/2025 in [facility] OR with Generic Provider Leanna   Do you snore loudly? 0 filed at 06/04/2025 1324 0  filed at 05/15/2025 1553   Do you often feel tired or fatigued after your sleep? 0 filed at 06/04/2025 1324 0  filed at 05/15/2025 1553   Has anyone ever observed you stop breathing in your sleep? 0 filed at 06/04/2025 1324 0  filed at 05/15/2025 1553   Do you have or are you being treated for high blood pressure? 1 filed at 06/04/2025 1324 1  filed at 05/15/2025 1553   Recent BMI (Calculated) 39.7 filed at 06/04/2025 1324 39.7  filed at 05/15/2025 1553   Is BMI greater than 35 kg/m2? 1=Yes filed at 06/04/2025 1324 1=Yes  filed at 05/15/2025 1553   Age older than 50 years old? 1=Yes filed at 06/04/2025 1324 1=Yes  filed at 05/15/2025 1553   Is your neck circumference greater than 17 inches (Male) or 16 inches (Female)? 0 filed at 06/04/2025 1324 --   Gender - Male 0=No filed at 06/04/2025 1324 0=No  filed at 05/15/2025 1553   STOP-BANG Total Score 3 filed at 06/04/2025 1324 --          Prodigy: High Risk  Total Score: 12              Prodigy Age Score           ARISCAT Score for Postoperative Pulmonary Complications      Flowsheet Row Pre-Admission Testing from 6/4/2025 in [facility]   Age Calculated Score 3 filed at 06/04/2025 1413   Preoperative SpO2 0 filed at 06/04/2025 1413   Respiratory infection in the last month Either upper or lower (i.e., URI, bronchitis, pneumonia), with fever and antibiotic treatment 0 filed at 06/04/2025 1413   Preoperative anemia (Hgb less than 10 g/dl) 0 filed at 06/04/2025 1413   Surgical incision  0 filed at 06/04/2025 1413   Duration of surgery  0 filed at 06/04/2025 1413   Emergency Procedure  0 filed at 06/04/2025 1413   ARISCAT Total Score  3 filed at 06/04/2025 1413          Kailash Perioperative Risk for Myocardial Infarction or Cardiac Arrest (LUZ MARIA)    No data to display              Assessment and Plan:       HPI 69 y/o female scheduled for total hip arthroplasty-right on 6/23/2025 with  Dr. De Los Santos secondary to arthritis of right hip.  PMHX includes HTN, CAD s/p PCI to RCA (2021), PE, T2DM, COPD.  PAT is consulted today for perioperative risk stratification and optimization.    Seen by PCP 5/7/2025    Neuro:  No neurologic diagnosis, however, the patient is at increased risk for perioperative delirium secondary to  age, decreased functional status, polypharmacy  Patient is at increased risk for perioperative CVA secondary to  Afib, perioperative interruption of anticoagulant, HTN, DM, increased age    HEENT:  No HEENT diagnosis or significant findings on chart review or clinical presentation and evaluation. No further preoperative testing/intervention indicated at this time.    Cardiovascular:  Follows with Dr. Esteban and was seen on 12/19/2025 for pAfib  ECHO 4/2022:  EF 50-55%; ABL basal inferior segment; IDD; mod reduced RV fx  Continue metoprolol and ranexa  Holding Zetia 24 hrs prior to procedure   Holding Eliquis 3 days prior to procedure  METS: 3.6  RCRI: 0 points, 3.9%  risk for postoperative MACE     Pulmonary:  Unprovoked PE 2020  Inhalers as needed  Stop Bang score is 3 placing patient at intermediate risk for LATOYA  PRODIGY: High risk for opioid induced respiratory depression  Pumonary education discussed, patient also provided deep breathing exerciseswith educational handout    Renal:   No renal diagnosis, however patient is at increase risk for perioperative renal complications secondary to  Age equal to or greater than 56, BMI equal to or greater than 30, HTN, diabetes, use of an ace, arb, or NSAID      Endocrine:  T2DM - Managed by PCO   Holding Mounjaro 24 hrs prior to procedure  Take 1/2 dose of glargine evening prior to procedure    Hematologic:  No hematologic diagnosis, however patient is at an increased risk for DVT  Caprini Score 15, patient at High risk for  perioperative DVT.  Patient provided with VTE education/handout.    Gastrointestinal:   No GI diagnosis or significant findings on chart review or clinical presentation and evaluation.   Apfel 3    Orthopedic surgery  Seen by Dr. De Los Santos on 5/15/2025 for preop of right hip  Date 4/1/2025 A1c: 4.7  Plan for total right hip arthroplasty    Infectious disease:   No infectious diagnosis or significant findings on chart review or clinical presentation and evaluation.   Prescription provided for CHG body wash and dental rinse. CHG use instructions reviewed and provided to patient.  Staph screen collected    Musculoskeletal:   No diagnosis or significant findings on chart review or clinical presentation and evaluation.     Anesthesia/Airway:  No anesthesia complications      Medication instructions and NPO guidelines reviewed with the patient.  All questions or concerns discussed and addressed.      Labs and EKG ordered                  [1]   Past Medical History:  Diagnosis Date    Acute non-ST elevation myocardial infarction (NSTEMI) (Multi) 05/19/2024    Angina pectoris 10/11/2023    Arthritis     Atrial fibrillation (Multi) 10/11/2023    Benign essential hypertension 09/04/2019    Chest discomfort 10/11/2023    Chronic obstructive pulmonary disease (Multi) 12/30/2018    CKD (chronic kidney disease) 09/09/2022    Coronary arteriosclerosis 10/11/2023    DVT (deep venous thrombosis) (Multi)     Edema 10/11/2023    Electrocardiogram abnormal 10/11/2023    Endometrial cancer (Multi) 2015    HTN (hypertension) 12/30/2018    Mixed hyperlipidemia 12/30/2018    OA (osteoarthritis)     severe    Obesity     PE (pulmonary thromboembolism) (Multi) 10/20/2021    Polycythemia     Presence of coronary angioplasty implant and graft 04/08/2022    Renal cyst     Spinal stenosis     Type 2 diabetes mellitus without complications 12/11/2018   [2]   Past Surgical History:  Procedure Laterality Date    CARDIAC CATHETERIZATION  04/2022     stent    CHOLECYSTECTOMY  12/2016    CORONARY STENT PLACEMENT  04/22    FL GUIDED ASPIRATION OR INJECTION LARGE JOINT LEFT Left 12/18/2023    FL GUIDED ASPIRATION OR INJECTION LARGE JOINT LEFT 12/18/2023 Ralph Triplett MD [facility]    FL GUIDED ASPIRATION OR INJECTION LARGE JOINT RIGHT Right 12/14/2023    FL GUIDED ASPIRATION OR INJECTION LARGE JOINT RIGHT 12/14/2023 Ralph Triplett MD [facility]    HYSTERECTOMY  08/2015    JOINT REPLACEMENT Right 10/2010    TKR   [3]   Family History  Problem Relation Name Age of Onset    Arthritis Mother Betty Villeda     Diabetes Mother Betty Villeda     Heart disease Mother Betty Cejatrong     Hypertension Mother Betty Dallasg     Miscarriages / Stillbirths Mother Betty Cejatrong     Arthritis Father Vijay Dallasg     Cancer Father Vijay Villeda     Diabetes Father Vijay Villeda     Heart disease Father Vijay Villeda     Stroke Maternal Grandmother Inés Licae     Arthritis Sister Mera Verona     Cancer Sister Mera Paige     Diabetes Sister Mera Verona     Hypertension Sister Mera Verona     Ovarian cancer Sister Mera Verona     Cancer Mother's Sister Yahaira De Oliveira     Stroke Mother's Sister Shanda Lundberg    [4]   Allergies  Allergen Reactions    Penicillins Swelling

## 2025-06-04 NOTE — PREPROCEDURE INSTRUCTIONS
Thank you for visiting [facility] ([facility]) today for your pre-procedure evaluation, you were seen by     Lavonne Johnson CNP  Pre Admission Testing  [facility]  [phone]    This summary includes instructions and information to aid you during your perioperative period.  Please read carefully. If you have any questions about your visit today, please call the number listed above.  If you become ill or have any changes to your health before your surgery, please contact your primary care provider and alert your surgeon.    Prior to your procedure, you will need the following clearances.  Please notify PAT when you have your appointments scheduled  Clearance: Cardiology      Preparing for your Surgery       Exercises  Preoperative Deep Breathing Exercises  Why it is important to do deep breathing exercises before my surgery?  Deep breathing exercises strengthen your breathing muscles.  This helps you to recover after your surgery and decreases the chance of breathing complications.  How are the deep breathing exercises done?  Sit straight with your back supported.  Breathe in deeply and slowly through your nose. Your lower rib cage should expand and your abdomen may move forward.  Hold that breath for 3 to 5 seconds.  Breathe out through pursed lips, slowly and completely.  Rest and repeat 10 times every hour while awake.  Rest longer if you become dizzy or lightheaded.       Preoperative Brain Exercises    What are brain exercises?  A brain exercise is any activity that engages your thinking (cognitive) skills.    What types of activities are considered brain exercises?  Jigsaw puzzles, crossword puzzles, word jumble, memory games, word search, and many more.  Many can be found free online or on your phone via a mobile yola.    Why should I do brain exercises before my surgery?  More recent research has shown brain exercise before surgery can lower the risk of postoperative delirium  (confusion) which can be especially important for older adults.  Patients who did brain exercises for 5 to 10 hours the days before surgery, cut their risk of postoperative delirium in half up to 1 week after surgery.    Sit-to-Stand Exercise    What is the sit-to-stand exercise?  The sit-to-stand exercise strengthens the muscles of your lower body and muscles in the center of your body (core muscles for stability) helping to maintain and improve your strength and mobility.  How do I do the sit-to-stand exercise?  The goal is to do this exercise without using your arms or hands.  If this is too difficult, use your arms and hands or a chair with armrests to help slowly push yourself to the standing position and lower yourself back to the sitting position. As the movement becomes easier use your arms and hands less.    Steps to the sit-to-stand exercise  Sit up tall in a sturdy chair, knees bent, feet flat on the floor shoulder-width apart.  Shift your hips/pelvis forward in the chair to correctly position yourself for the next movement.  Lean forward at your hips.  Stand up straight putting equal weight on both feet.  Check to be sure you are properly aligned with the chair, in a slow controlled movement sit back down.  Repeat this exercise 10-15 times.  If needed you can do it fewer times until your strength improves.  Rest for 1 minute.  Do another 10-15 sit-to-stand exercises.  Try to do this in the morning and evening.        Instructions    Preoperative Fasting Guidelines    Why must I stop eating and drinking near surgery time?  With sedation, food or liquid in your stomach can enter your lungs causing serious complications  Food can increase nausea and vomiting    Why must I stop eating and drinking before surgery?     With anesthesia, food or liquid in your stomach can enter your lungs causing serious complications     GLP-1 medications can slow the movement of food through your stomach and intestines.  This  further increases the risk of food entering your lungs with anesthesia     When do I need to stop eating and drinking before my surgery?     To help ensure food has passed out of your stomach, START a clear liquid diet 24 hours before your surgery     On the day of your surgery/procedure, STOP all clear liquids 2 hours before your arrival time to the hospital/facility      A clear liquid diet consists of clear liquids and foods that melt into a clear liquid (i.e. gelatin) and excludes solid foods and liquids you cannot see through (i.e. milk). Clears can and should contain sugar to obtain a sufficient number of calories.  A clear liquid diet includes     Clear, fat-free broth     Clear nutritional drinks     Pulp-free popsicles, vegetable and fruit juice     Gelatin     Coffee and tea without creamer or milk     Clear soda and sports drinks       Diabetic Patients     Clear liquids should not be sugar-free      Check your blood glucose levels as you normally do     If you have symptoms of low blood glucose (shakiness, sweating, dizziness, confusion) or high blood glucose (dry mouth, excessive thirst, frequent urination, blurry vision), check your blood glucose level     For low blood glucose increase your consumption of sugar-containing clear liquid      For high blood glucose, decrease your consumption of sugar-containing clears and treat as you normally would     If symptoms persist seek medical attention                      Simple things you can do to help prevent blood clots     Blood clots are blockages that can form in the body's veins. When a blood clot forms in your deep veins, it may be called a deep vein thrombosis, or DVT for short. Blood clots can happen in any part of the body where blood flows, but they are most common in the arms and legs. If a piece of a blood clot breaks free and travels to the lungs, it is called a pulmonary embolus (PE). A PE can be a very serious problem.         Being in the  hospital or having surgery can raise your chances of getting a blood clot because you may not be well enough to move around as much as you normally do.         Ways you can help prevent blood clots in the hospital       Wearing SCDs  SCDs stands for Sequential Compression Devices.   SCDs are special sleeves that wrap around your legs. They attach to a pump that fills them with air to gently squeeze your legs every few minutes.  This helps return the blood in your legs to your heart.   SCDs should only be taken off when walking or bathing. SCDs may not be comfortable, but they can help save your life.              Pump SCD leg sleeves  Wearing compression stockings - if your doctor orders them. These special snug-fitting stockings gently squeeze your legs to help blood flow.       Walking. Walking helps move the blood in your legs.   If your doctor says it is ok, try walking the halls at least   5 times a day. Ask us to help you get up, so you don't fall.      Taking any blood-thinning medicines your doctor orders.              Ways you can help prevent blood clots at home         Wearing compression stockings - if your doctor orders them.   Walking - to help move the blood in your legs.    Taking any blood-thinning medicines your doctor orders.      Signs of a blood clot or PE    Tell your doctor or nurse right away if you have any of the problems listed below.         If you are at home, seek medical care right away. Call 911 for chest pain or problems breathing.            Signs of a blood clot (DVT) - such as pain, swelling, redness, or warmth in your arm or legs.  Signs of a pulmonary embolism (PE) - such as chest pain or feeling short of breath      Tobacco and Alcohol;  Do not drink alcohol or smoke within 24 hours of surgery.  It is best to quit smoking for as long as possible before any surgery or procedure.    Other Instructions  Why did I have my nose, under my arms, and groin swabbed? The purpose of the  "swab is to identify Staphylococcus aureus inside your nose or on your skin.  The swab was sent to the laboratory for culture.  A positive swab/culture for Staphylococcus aureus is called colonization or carriage.     What is Staphylococcus aureus? Staphylococcus aureus, also known as \"staph\", is a germ found on the skin or in the nose of healthy people.  Sometimes Staphylococcus aureus can get into the body and cause an infection.  This can be minor (such as pimples, boils, or other skin problems).  It might also be serious (such as a blood infection, pneumonia, or a surgical site infection).     What is Staphylococcus aureus colonization or carriage? Colonization or carriage means that a person has the germ but is not sick from it.  These bacteria can be spread on the hands or when breathing or sneezing.   How is Staphylococcus aureus spread? It is most often spread by close contact with a person or item that carries it.   What happens if my culture is positive for Staphylococcus aureus? Your doctor/medical team will use this information to guide any antibiotic treatment which may be necessary.  Regardless of the culture results, we will clean the inside of your nose with a betadine swab just before you have your surgery.   Will I get an infection if I have Staphylococcus aureus in my nose or on my skin? Anyone can get an infection with Staphylococcus aureus.  However, the best way to reduce your risk of infection is to follow the instructions provided to you for the use of your CHG soap and dental rinse.      Body Wash:     What is a home preoperative antibacterial shower? This shower is a way of cleaning the skin with a germ-killing solution before surgery.  The solution contains chlorhexidine, commonly known as CHG.  CHG is a skin cleanser with germ-killing ability.  Let your doctor know if you are allergic to chlorhexidine.   Why do I need to take a preoperative antibacterial shower? Skin is not sterile.  It is " best to try to make your skin as free of germs as possible before surgery.  Proper cleansing with a germ-killing soap before surgery can lower the number of germs on your skin.  This helps to reduce the risk of infection at the surgical site.    Following the instructions listed below will help you prepare your skin for surgery.   How do I use the solution?  If you plan to wash your hair, use your regular shampoo; then rinse your hair and body thoroughly to remove any shampoo residue  Wash your face with your regular soap or water only  Thoroughly rinse your body with water from the neck down  Apply Hibiclens directly on your skin or on a wet washcloth and wash gently; move away from the shower stream when applying Hibiclens to avoid rinsing it off too soon  Rinse thoroughly with warm water and keep out of eyes, ears and mouth; if Hibiclens comes in contact with these areas, rinse out promptly  Dry your skin with a towel  Do not use your regular soap after applying and rinsing with Hibiclens  Do not apply lotions or deodorants to the cleaned body area      Oral/Dental Rinse:     What is oral/dental rinse?  It is a mouthwash. It is a way of cleaning the mouth with a germ-killing solution before your surgery.  The solution contains chlorhexidine, commonly known as CHG. It is used inside the mouth to kill a bacteria known as Staphylococcus aureus.  Let your doctor know if you are allergic to Chlorhexidine.   Why do I need to use CHG oral/dental rinse? The CHG oral/dental rinse helps to kill a bacteria in your mouth known as Staphylococcus aureus.  This reduces the risk of infection at the surgical site.    Using your CHG oral/dental rinse STEPS: Use your CHG oral/dental rinse after you brush your teeth the night before (at bedtime) and the morning of your surgery.  Follow all directions on your prescription label.    Use the cap on the container to measure 15 ml.  Swish (gargle if you can) the mouthwash in your mouth  for at least 30 seconds, (do not swallow) and spit out.  After you use your CHG rinse, do not rinse your mouth with water, drink or eat.    Please refer to the prescription label for the appropriate time to resume oral intake   What side effects might I have using the CHG oral/dental rinse? CHG rinse will stick to plaque on the teeth.  Brush and floss just before use.  Teeth brushing will help avoid staining of plaque during use.          The Week before Surgery        Seven days before Surgery  Check your PAT medication instructions  Do the exercises provided to you by PAT  Arrange for a responsible, adult licensed  to take you home after surgery and stay with you for 24 hours.  You will not be permitted to drive yourself home if you have received any anesthetic/sedation  Six days before surgery  Check your PAT medication instructions  Do the exercises provided to you by PAT  Start using Chlorhexidene (CHG) body wash if prescribed  Five days before surgery  Check your PAT medication instructions  Do the exercises provided to you by PAT  Continue to use CHG body wash if prescribed  Three days before surgery  Check your PAT medication instructions  Do the exercises provided to you by PAT  Continue to use CHG body wash if prescribed  Two days before surgery  Check your PAT medication instructions  Do the exercises provided to you by PAT  Continue to use CHG body wash if prescribed    The Day before Surgery       Check your PAT medication and all other PAT instructions including when to stop eating and drinking  You will be called with your arrival time for surgery in the late afternoon.  If you do not receive a call please reach out to [facility] Pre-Op. [phone]  Do not smoke or drink 24 hours before surgery  Prepare items to bring with you to the hospital  Shower with your chlorhexidine wash if prescribed  Brush your teeth and use your chlorhexidine dental rinse if prescribed    The Day of Surgery       Check  your PAT medication instructions  Ensure you follow the instructions for when to stop eating and drinking  Shower, if prescribed use CHG.  Do not apply any lotions, creams, moisturizers, perfume or deodorant  Brush your teeth and use your CHG dental rinse if prescribed  Wear loose comfortable clothing  Avoid make-up  Remove  jewelry and piercings, consider professional piercing removal with a plastic spacer if needed  Bring photo ID and Insurance card  Bring an accurate medication list that includes medication dose, frequency and allergies  Bring a copy of your advanced directives (will, health care power of )  Bring any devices and controllers as well as medical devices you have been provided with for surgery (CPAP, slings, braces, etc.)  Dentures, eyeglasses, and contacts will be removed before surgery, please bring cases for contacts or glasses

## 2025-06-05 ENCOUNTER — PHARMACY VISIT (OUTPATIENT)
Dept: PHARMACY | Facility: CLINIC | Age: 70
End: 2025-06-05
Payer: MEDICARE

## 2025-06-05 ENCOUNTER — APPOINTMENT (OUTPATIENT)
Dept: CARDIOLOGY | Facility: CLINIC | Age: 70
End: 2025-06-05
Payer: MEDICARE

## 2025-06-05 VITALS
HEART RATE: 70 BPM | OXYGEN SATURATION: 95 % | BODY MASS INDEX: 36.7 KG/M2 | HEIGHT: 64 IN | SYSTOLIC BLOOD PRESSURE: 93 MMHG | DIASTOLIC BLOOD PRESSURE: 57 MMHG | RESPIRATION RATE: 18 BRPM | TEMPERATURE: 98.6 F | WEIGHT: 215 LBS

## 2025-06-05 DIAGNOSIS — E78.2 MIXED HYPERLIPIDEMIA: ICD-10-CM

## 2025-06-05 DIAGNOSIS — I48.0 PAROXYSMAL ATRIAL FIBRILLATION (MULTI): ICD-10-CM

## 2025-06-05 DIAGNOSIS — I10 BENIGN ESSENTIAL HYPERTENSION: ICD-10-CM

## 2025-06-05 DIAGNOSIS — I25.10 CORONARY ARTERIOSCLEROSIS: ICD-10-CM

## 2025-06-05 DIAGNOSIS — I20.9 ANGINA PECTORIS: Primary | ICD-10-CM

## 2025-06-05 DIAGNOSIS — I10 PRIMARY HYPERTENSION: ICD-10-CM

## 2025-06-05 DIAGNOSIS — R94.31 ELECTROCARDIOGRAM ABNORMAL: ICD-10-CM

## 2025-06-05 DIAGNOSIS — R07.89 CHEST DISCOMFORT: ICD-10-CM

## 2025-06-05 PROCEDURE — 3078F DIAST BP <80 MM HG: CPT | Performed by: INTERNAL MEDICINE

## 2025-06-05 PROCEDURE — 3074F SYST BP LT 130 MM HG: CPT | Performed by: INTERNAL MEDICINE

## 2025-06-05 PROCEDURE — 1159F MED LIST DOCD IN RCRD: CPT | Performed by: INTERNAL MEDICINE

## 2025-06-05 PROCEDURE — 99214 OFFICE O/P EST MOD 30 MIN: CPT | Performed by: INTERNAL MEDICINE

## 2025-06-05 PROCEDURE — 3048F LDL-C <100 MG/DL: CPT | Performed by: INTERNAL MEDICINE

## 2025-06-05 PROCEDURE — 1160F RVW MEDS BY RX/DR IN RCRD: CPT | Performed by: INTERNAL MEDICINE

## 2025-06-05 PROCEDURE — 3044F HG A1C LEVEL LT 7.0%: CPT | Performed by: INTERNAL MEDICINE

## 2025-06-05 PROCEDURE — 3008F BODY MASS INDEX DOCD: CPT | Performed by: INTERNAL MEDICINE

## 2025-06-05 PROCEDURE — RXMED WILLOW AMBULATORY MEDICATION CHARGE

## 2025-06-05 PROCEDURE — 1126F AMNT PAIN NOTED NONE PRSNT: CPT | Performed by: INTERNAL MEDICINE

## 2025-06-05 RX ORDER — CHLORHEXIDINE GLUCONATE ORAL RINSE 1.2 MG/ML
15 SOLUTION DENTAL DAILY
Qty: 473 ML | Refills: 0 | Status: SHIPPED | OUTPATIENT
Start: 2025-06-05 | End: 2025-06-21

## 2025-06-05 ASSESSMENT — PAIN SCALES - GENERAL: PAINLEVEL_OUTOF10: 0-NO PAIN

## 2025-06-05 ASSESSMENT — LIFESTYLE VARIABLES
AUDIT-C TOTAL SCORE: 0
HOW MANY STANDARD DRINKS CONTAINING ALCOHOL DO YOU HAVE ON A TYPICAL DAY: PATIENT DOES NOT DRINK
HOW OFTEN DO YOU HAVE SIX OR MORE DRINKS ON ONE OCCASION: NEVER
SKIP TO QUESTIONS 9-10: 1
HOW OFTEN DO YOU HAVE A DRINK CONTAINING ALCOHOL: NEVER

## 2025-06-05 ASSESSMENT — ENCOUNTER SYMPTOMS
DEPRESSION: 0
LOSS OF SENSATION IN FEET: 0
OCCASIONAL FEELINGS OF UNSTEADINESS: 1

## 2025-06-05 NOTE — PROGRESS NOTES
[facility] [place]    Interventional Cardiology    History of present illness:  This is a very pleasant 69-year-old female with history of coronary disease status post PCI to RCA in 2021, history of pulmonary embolism on Eliquis, severe hyperlipidemia, obesity and hypertension. Patient returns to my office for follow-up. Feeling overall good. Denies having any chest pain shortness of breath palpitations dizziness or lightheadedness. Her cholesterol is much more controlled on Repatha and ezetimibe.       Medical History[1]    Surgical History[2]    Allergies[3]     reports that she quit smoking about 27 years ago. Her smoking use included cigarettes. She has never been exposed to tobacco smoke. She has never used smokeless tobacco. She reports that she does not drink alcohol and does not use drugs.    Family History[4]    Patient's Medications   New Prescriptions    No medications on file   Previous Medications    ALBUTEROL 2.5 MG /3 ML (0.083 %) NEBULIZER SOLUTION    Use 1 vial (2.5 mg) by nebulization every 6 hours if needed for wheezing.    ALBUTEROL 90 MCG/ACTUATION INHALER    INHALE 2 PUFFS BY MOUTH FOUR TIMES A DAY AS NEEDED FOR WHEEZING    APIXABAN (ELIQUIS) 5 MG TABLET    TAKE 1 TABLET BY MOUTH TWO TIMES A DAY    BLOOD-GLUCOSE SENSOR (FREESTYLE DORON 3 SENSOR) DEVICE    Change sensor every 15 days as directed - Please dispense Doron 3 plus sensors    CHLORHEXIDINE (HIBICLENS) 4 % EXTERNAL LIQUID    Apply 1 Application topically once daily for 5 days. Use per CPM/PAT provided instructions    CHLORHEXIDINE (PERIDEX) 0.12 % SOLUTION    Use 15 mL in the mouth or throat once daily for 2 doses. Do not swallow    CYANOCOBALAMIN (VITAMIN B-12) 1,000 MCG TABLET    Take 1 tablet (1,000 mcg) by mouth once daily.    DULOXETINE (CYMBALTA) 30 MG DR CAPSULE    Take 1 capsule (30 mg) by mouth once daily. Do not crush or chew.    EVOLOCUMAB (REPATHA SURECLICK) 140 MG/ML INJECTION    " Inject 1 mL (140 mg) under the skin every 14 (fourteen) days.    EZETIMIBE (ZETIA) 10 MG TABLET    Take 1 tablet (10 mg) by mouth once daily.    FLUTICASONE FUROATE-VILANTEROL (BREO ELLIPTA) 200-25 MCG/DOSE INHALER    INHALE 1 PUFF BY MOUTH ONE TIME DAILY    FOLIC ACID (FOLVITE) 1 MG TABLET    Take by mouth once daily.    GABAPENTIN (NEURONTIN) 300 MG CAPSULE    Take 2 capsules (600 mg) by mouth 3 times a day.    INSULIN GLARGINE (LANTUS SOLOSTAR U-100 INSULIN) 100 UNIT/ML (3 ML) PEN    Inject 10 Units under the skin once daily at bedtime. Discard pen 28 days after use.    METOPROLOL SUCCINATE XL (TOPROL-XL) 50 MG 24 HR TABLET    Take 1 tablet (50 mg) by mouth once daily.    OMEPRAZOLE (PRILOSEC) 40 MG DR CAPSULE    Take 1 capsule (40 mg) by mouth once daily.    PEN NEEDLE, DIABETIC 32 GAUGE X 5/32\" NEEDLE    Use daily with insulin injection.    RANOLAZINE (RANEXA) 500 MG 12 HR TABLET    TAKE 1 TABLET BY MOUTH TWO TIMES A DAY    SPIRONOLACTONE (ALDACTONE) 25 MG TABLET    Take 1 tablet (25 mg) by mouth once daily.    TIRZEPATIDE (MOUNJARO) 10 MG/0.5 ML PEN INJECTOR    Inject 10 mg under the skin every 7 days.   Modified Medications    No medications on file   Discontinued Medications    No medications on file       Objective   Physical Exam  General: Patient in no acute distress   HEENT: Atraumatic normocephalic.  Neck: Supple, jugular venous pressure within normal limit.  No bruits  Lungs: Clear to auscultation bilaterally  Cardiovascular: Regular rate and rhythm, normal heart sounds, no murmurs rubs or gallops  Abdomen: Soft nontender nondistended.  Normal bowel sounds.  Extremities: Warm to touch, no edema.    Lab Review   Pre-Admission Testing on 06/04/2025   Component Date Value    WBC 06/04/2025 11.1     nRBC 06/04/2025 0.0     RBC 06/04/2025 5.19     Hemoglobin 06/04/2025 16.3 (H)     Hematocrit 06/04/2025 47.5 (H)     MCV 06/04/2025 92     MCH 06/04/2025 31.4     MCHC 06/04/2025 34.3     RDW 06/04/2025 13.2  "    Platelets 06/04/2025 232     Neutrophils % 06/04/2025 74.7     Immature Granulocytes %,* 06/04/2025 0.5     Lymphocytes % 06/04/2025 15.5     Monocytes % 06/04/2025 7.1     Eosinophils % 06/04/2025 1.7     Basophils % 06/04/2025 0.5     Neutrophils Absolute 06/04/2025 8.24 (H)     Immature Granulocytes Ab* 06/04/2025 0.06     Lymphocytes Absolute 06/04/2025 1.71     Monocytes Absolute 06/04/2025 0.79     Eosinophils Absolute 06/04/2025 0.19     Basophils Absolute 06/04/2025 0.06     Glucose 06/04/2025 122 (H)     Sodium 06/04/2025 137     Potassium 06/04/2025 4.1     Chloride 06/04/2025 102     Bicarbonate 06/04/2025 20 (L)     Anion Gap 06/04/2025 19     Urea Nitrogen 06/04/2025 20     Creatinine 06/04/2025 0.74     eGFR 06/04/2025 87     Calcium 06/04/2025 8.8     Albumin 06/04/2025 3.9     Alkaline Phosphatase 06/04/2025 55     Total Protein 06/04/2025 6.1 (L)     AST 06/04/2025 10     Bilirubin, Total 06/04/2025 0.7     ALT 06/04/2025 9     Ventricular Rate 06/04/2025 77     Atrial Rate 06/04/2025 77     VA Interval 06/04/2025 156     QRS Duration 06/04/2025 84     QT Interval 06/04/2025 408     QTC Calculation(Bazett) 06/04/2025 461     P Axis 06/04/2025 35     R [place] 06/04/2025 -31     T Axis 06/04/2025 70     QRS Count 06/04/2025 13     Q Onset 06/04/2025 218     P Onset 06/04/2025 140     P Offset 06/04/2025 206     T Offset 06/04/2025 422     QTC Fredericia 06/04/2025 443     Staph/MRSA Screen Culture 06/04/2025 Isolated: Methicillin Susceptible Staphylococcus aureus (MSSA) (A)         Assessment/Plan   Problem List[5]     This is a very pleasant 69-year-old female with history of coronary disease status post PCI to RCA in 2021, history of pulmonary embolism on Eliquis, severe hyperlipidemia, obesity and hypertension. Patient returns to my office for follow-up. Feeling overall good. Denies having any chest pain shortness of breath palpitations dizziness or lightheadedness. Her cholesterol is much more  controlled on Repatha and ezetimibe.        Patient stable from my standpoint on optimal medical therapy.  Recommend to continue current medications.  Will follow-up in 6 months.  Discussed with her weight loss and lifestyle modification.  Patient scheduled to have left hip surgery.  She has been asymptomatic on optimal medical therapy.  Okay to hold Eliquis for max 3 days because she had pulmonary embolism and restart after.  Blood pressure running on the low normal side but at home is very well-controlled at 110 120.  Patient is asymptomatic.  Will follow-up in 6 months.      Opal Esteban MD              [1]   Past Medical History:  Diagnosis Date    Acute non-ST elevation myocardial infarction (NSTEMI) (Multi) 05/19/2024    Allergic 1960    Angina pectoris 10/11/2023    Arthritis     Atrial fibrillation (Multi) 10/11/2023    Benign essential hypertension 09/04/2019    Chest discomfort 10/11/2023    Chronic obstructive pulmonary disease (Multi) 12/30/2018    Chronic pain disorder     CKD (chronic kidney disease) 09/09/2022    Colon polyp 2018    Coronary arteriosclerosis 10/11/2023    DVT (deep venous thrombosis) (Multi)     Easy bruising 2022    Edema 10/11/2023    Electrocardiogram abnormal 10/11/2023    Endometrial cancer (Multi) 2015    Endometriosis     HTN (hypertension) 12/30/2018    Joint pain     Low back pain     Mixed hyperlipidemia 12/30/2018    Neuropathy in diabetes (Multi)     OA (osteoarthritis)     severe    Obesity     PE (pulmonary thromboembolism) (Multi) 10/20/2021    Polycythemia     PONV (postoperative nausea and vomiting) 2010    Presence of coronary angioplasty implant and graft 04/08/2022    Renal cyst     Spinal stenosis     Type 2 diabetes mellitus without complications 12/11/2018    Uterine cancer (Multi) 8/2015   [2]   Past Surgical History:  Procedure Laterality Date    CARDIAC CATHETERIZATION  04/2022    stent    CHOLECYSTECTOMY  12/2016    COLONOSCOPY  2018    CORONARY STENT  PLACEMENT  04/22    DILATION AND CURETTAGE OF UTERUS  1988    FL GUIDED ASPIRATION OR INJECTION LARGE JOINT LEFT Left 12/18/2023    FL GUIDED ASPIRATION OR INJECTION LARGE JOINT LEFT 12/18/2023 Ralph Triplett MD [facility]    FL GUIDED ASPIRATION OR INJECTION LARGE JOINT RIGHT Right 12/14/2023    FL GUIDED ASPIRATION OR INJECTION LARGE JOINT RIGHT 12/14/2023 Ralph Triplett MD [facility]    HYSTERECTOMY  08/2015    JOINT REPLACEMENT Right 10/2010    TKR    LYMPH NODE BIOPSY  2015?    ORTHOPEDIC SURGERY  2010   [3]   Allergies  Allergen Reactions    Penicillins Swelling   [4]   Family History  Problem Relation Name Age of Onset    Arthritis Mother Betty Cejatrong     Diabetes Mother Betty Cejatrong     Heart disease Mother Betty Cejatrong     Hypertension Mother Betty Cejatrong     Miscarriages / Stillbirths Mother Betty Villeda     Arthritis Father Vijay Cejatrong     Cancer Father Vijay Cejatrong     Diabetes Father Vijay Cejatrong     Heart disease Father Vijay Cejatrong     Stroke Maternal Grandmother Inés Licea     Arthritis Sister Mera Paige     Cancer Sister Mera Paige     Diabetes Sister Mera Paige     Hypertension Sister Mera Denton     Ovarian cancer Sister Mera Denton     Cancer Mother's Sister Yahaira Jensens     Stroke Mother's Sister Shanda Hernandeza     Arthritis Sister Mera Paige     Cancer Sister Mera Denton     Diabetes Sister Mera Denton     Hypertension Sister Mera Denton     Ovarian cancer Sister Mera Denton     Cancer Mother's Sister Yahaira Alvino     Stroke Mother's Sister Shanda Corralvala    [5]   Patient Active Problem List  Diagnosis    Benign essential hypertension    Diabetes mellitus (Multi)    Chronic pain of left knee    Edema    Chest discomfort    Angina pectoris    Non-ST elevation MI (NSTEMI) (Multi)    Mixed hyperlipidemia    Hyperlipidemia    Syncope    Primary insomnia    Osteoarthritis    Obesity    Morbidly obese (Multi)     Nontoxic single thyroid nodule    Hypoxia    HTN (hypertension)    Elevated white blood cell count, unspecified    Electrocardiogram abnormal    Dyspnea    Type 2 diabetes mellitus without complications    Diabetes mellitus type 2 in obese    Coronary arteriosclerosis    CKD (chronic kidney disease)    Blood glucose abnormal    Atrial fibrillation (Multi)    PE (pulmonary thromboembolism) (Multi)    Abnormal results of liver function studies    Spondylolisthesis at L4-L5 level    Lumbar stenosis with neurogenic claudication    Lumbar pain    Dorsalgia    Difficulty walking    Arthritis of knee, left    Arthritis of left hip    Endometrial cancer (Multi)    History of total knee replacement    Muscle weakness (generalized)    Other symbolic dysfunctions    Personal history of malignant neoplasm of other parts of uterus    Presence of coronary angioplasty implant and graft    Urinary tract infection    Arthritis of right hip

## 2025-06-06 LAB — STAPHYLOCOCCUS SPEC CULT: ABNORMAL

## 2025-06-10 LAB
ATRIAL RATE: 77 BPM
P AXIS: 35 DEGREES
P OFFSET: 206 MS
P ONSET: 140 MS
PR INTERVAL: 156 MS
Q ONSET: 218 MS
QRS COUNT: 13 BEATS
QRS DURATION: 84 MS
QT INTERVAL: 408 MS
QTC CALCULATION(BAZETT): 461 MS
QTC FREDERICIA: 443 MS
R AXIS: -31 DEGREES
T AXIS: 70 DEGREES
T OFFSET: 422 MS
VENTRICULAR RATE: 77 BPM

## 2025-06-10 PROCEDURE — RXMED WILLOW AMBULATORY MEDICATION CHARGE

## 2025-06-12 ENCOUNTER — PHARMACY VISIT (OUTPATIENT)
Dept: PHARMACY | Facility: CLINIC | Age: 70
End: 2025-06-12
Payer: MEDICARE

## 2025-06-13 PROCEDURE — RXMED WILLOW AMBULATORY MEDICATION CHARGE

## 2025-06-16 ENCOUNTER — ANESTHESIA EVENT (OUTPATIENT)
Dept: OPERATING ROOM | Facility: HOSPITAL | Age: 70
End: 2025-06-16
Payer: MEDICARE

## 2025-06-16 ENCOUNTER — PHARMACY VISIT (OUTPATIENT)
Dept: PHARMACY | Facility: CLINIC | Age: 70
End: 2025-06-16
Payer: MEDICARE

## 2025-06-19 ENCOUNTER — TELEPHONE (OUTPATIENT)
Dept: PRIMARY CARE | Facility: CLINIC | Age: 70
End: 2025-06-19
Payer: MEDICARE

## 2025-06-19 PROCEDURE — RXMED WILLOW AMBULATORY MEDICATION CHARGE

## 2025-06-19 RX ORDER — CEFADROXIL 500 MG/1
500 CAPSULE ORAL 2 TIMES DAILY
Qty: 14 CAPSULE | Refills: 0 | Status: SHIPPED | OUTPATIENT
Start: 2025-06-19 | End: 2025-06-30

## 2025-06-19 RX ORDER — POLYETHYLENE GLYCOL 3350 17 G/17G
17 POWDER, FOR SOLUTION ORAL DAILY
Qty: 238 G | Refills: 0 | Status: SHIPPED | OUTPATIENT
Start: 2025-06-19

## 2025-06-19 RX ORDER — DOCUSATE SODIUM 100 MG/1
100 CAPSULE, LIQUID FILLED ORAL 2 TIMES DAILY
Qty: 20 CAPSULE | Refills: 0 | Status: SHIPPED | OUTPATIENT
Start: 2025-06-19 | End: 2025-07-03

## 2025-06-19 RX ORDER — OXYCODONE AND ACETAMINOPHEN 5; 325 MG/1; MG/1
1 TABLET ORAL EVERY 4 HOURS PRN
Qty: 36 TABLET | Refills: 0 | Status: SHIPPED | OUTPATIENT
Start: 2025-06-19 | End: 2025-06-29

## 2025-06-19 NOTE — DISCHARGE INSTRUCTIONS
TOTAL HIP AND KNEE REPLACEMENT DISCHARGE INSTRUCTIONS  Zbigniew De Los Santos MD      DRIVING & TRAVEL AFTER SURGERY   Patients should anticipate waiting at least 4-6 weeks before traveling long distances after surgery.  You will need to stop to walk around ever 1 hour during your travel to help with blood clot prevention.  Please call the office or your joint nurse to discuss prior to post-surgical travel.  Patients may not drive until cleared by the joint nurse or the office.    DENTAL PROCEDURES & CLEANINGS  You must wait a minimum of 3 months for elective dental appointments, including routine cleanings or dental work including bridges, crowns, extractions, etc..  For any dental visit - cleaning or dental procedures - patients must take an antibiotic 1 hour before the appointment.  Antibiotics are a lifelong need before dental appointments.  The antibiotic prescribed will be based on each patient's allergies.    WOUND CARE  If you experience continued drainage or bleeding, you may cover with abdominal pads (purchase at local drug store).  Knee replacements should wrap with an ace wrap.  Do not remove surgical dressing, it will be removed when you arrive in clinic for follow up visit.   Mesh dressing under the bandage will remain in place until it peels off on its own.  If it is loose, you may gently remove.  Do not remove if pulling causes resistance against the incision.      PAIN, SWELLING, BRUISING & CLICKING  Pain and swelling are a natural part of your recovery which is considered normal fur up to a year after surgery.  Symptoms may be treated with movement, ice, compression stockings, elevating your leg, and by following the pain medication regimen as prescribed.  Bruising is normal for several weeks after surgery and will run down the leg over time.  You may ice areas that are tender to help with discomfort.  You are required to wear the provided compression stockings, every day, for 4 weeks following surgery.   Remove the stockings at night and place them back on in the morning.  Pain and swelling may temporarily increase with an increase in activity or exercise.  Use ice after activity.  Audible clicking with movement or exercises is considered normal following joint replacement.  You may also feel decreased sensation or numbness near the incision site.  These are usually normal and may or may not fade over time.     PERSONAL HYGIENE  You may shower upon discharging from the hospital.  Soap and water is permitted to run over the surgical dressing, steri-strips and incision.  Do not scrub directly over these items.  DO NOT soak your incision in a bath, hot tub, pool or pond/lake for a minimum of 8 weeks following your surgery.  DO NOT use lotions, creams, ointments on your wound for a minimum of 6 weeks following your surgery. At that time you may use vitamin E to assist with softening of your incision.      RESTARTING HOME ROUTINE - DIET & MEDICATIONS  Post-operative constipation can result due to a combination of inactivity, anesthesia and pain medication. To help prevent this, you should increase your water and fiber intake. Physical activity such as walking will also help stimulate the bowels.   You may resume your normal diet when you discharge home.  Choose foods that help promote good bowel habits and prevent constipation, such as foods high in fiber.  You may restart your home medications the following day after your surgery UNLESS you have been given alternate instructions.  Follow the instructions given to you on your hospital discharge instructions for more information regarding your home medications.      IN-HOME PHYSICAL THERAPY & OUTPATIENT PHYSICAL THERAPY  In-home physical therapy will start 1-2 days after you get home from the hospital.    The home care agency will call within the first 24-48 hours to set up their first visit.  Please do not call your care team to inquire during this timeframe.  Continue  the exercises you were given in the hospital until you have been seen by in-home therapy.  Make sure to provide a phone number with the ability for the home care staff to leave a message if you do not answer your phone.    Outpatient physical therapy following knee replacement surgery should begin 2-3 weeks after surgery.  You should call to schedule this appointment ASAP if not already scheduled before surgery.  Waiting until you are ready for outpatient physical therapy will cause a delay in your care.  You may choose any outpatient physical therapy location.  Call the office for an order if needed.    EMERGENCIES - WHEN TO CONTACT THE SURGEON'S OFFICE IMMEDIATELY  Fever >101 with chills that has been present for at least 48 hours.   Excessive bleeding from incision that will not slow down. A small amount of drainage is normal and expected.  Once pressure is applied and the area is covered, do not continue to check the area regularly.  This will remove pressure and bleeding will continue.  Leave in place for 4-6 hours.  Signs of infection of incision-excessive drainage that is soaking through your dressing (especially if it is pus-like), redness that is spreading out from the edges of your incision, or increased warmth around the area.  Excruciating pain for which the pain medication, taken as instructed, is not helping.  Severe calf pain.  Go directly to the emergency room or call 911, if you are experiencing chest pain or difficulty breathing.    ICE & COLD THERAPY INSTRUCTIONS    To assist with pain control and post-op swelling, you should be using ice regularly throughout recovery, especially for the first 6 weeks, regardless of the cold therapy method you use.      Always make sure there is a layer of protection between the cold pad and your skin.    If you are using ICE PACKS or GEL PACKS, you will need to alternate 20 minutes on, 20 minutes off twice per hour.    If you are using an ICE MACHINE, please  follow the provided ice machine instructions.  These devices differ from ice or ice packs whereas the mechanism circulates water through tubing and a pad to provide longer periods of cold therapy to the desired site.  You can use your cold devices around the clock for optimal comfort.  We recommend using cold therapy after working with therapy or completing exercises on your own.  There is no set schedule in which you must follow while using cold therapy.  Below are a few points to remember when using a cold therapy device:    You do not need to need to use the 20 on, 20 off method.  Detach the pad from the cooler and ambulate at least once every hour.  You can check your skin under the pad at this time.  You may wear the cold therapy device during periods of sleep including overnight.  If you wake up during the night, you can check the skin at this time.  You do not need to wake up specifically to perform skin checks.  Empty the cooler and pad when device is not in use.  Follow 's instructions for cleaning your cold therapy device.      DISCHARGE MEDICATIONS    PAIN MEDICATION    __X__ Oxycodone-acetaminophen  Oxycodone-acetaminophen has been prescribed for post-operative pain control.    These medications may only be refilled if neededONCE every 7 days for a period of up to 6 weeks following surgery.  After 6 weeks, you will transition to acetaminophen and over -the- counter anti-inflammatories such as Ibuprofen, Advil or Aleve in conjunction with ICE/COLD THERAPY.   Side effects may be constipation and nausea, vomiting, sleepiness, dizziness, lightheadedness, headache, blurred vision, dry mouth sweating, itching (if you have itching, over-the -counter Benadryl can be used as needed).  You may NOT operate a motor vehicle while taking these medications or have been cleared by your care team.    Please call the office for a refill request.  The office staff and orthopedic nurses cannot refill  medications; messages should be left directly through the office.  Please do not call multiple times or call other members of the care team for medication needs, this will cause the refill to take longer.    Per State and Institutional policy, pain medications can only be refilled every 7 days for up to six weeks following surgery.   .    _______ Naproxen has been prescribed as an adjunct anti-inflammatory to assist in pain control.    Take one 500 mg tablet twice a day for 4 weeks.  You will not receive refills on this medication.   Side effects may include nausea.  May not be prescribed if you are on a more potent blood thinner than aspirin or have chronic kidney disease.    BLOOD THINNER    __X__ Blood Thinner   A blood thinner has been prescribed to prevent blood clots in your leg or lungs. Take as prescribed on the bottle for 4 weeks. You will not receive a refill on this medication.      STOOL SOFTENERS    _X___ Colace (Docusate Sodium)   Take this medication to help with constipation while using the Oxycodone for pain control.  You will not receive a refill on this medication.  If you have not had a bowel movement for 2 days after surgery if you feel painful constipation, you may try over-the-counter Miralax or a suppository. If you still are not able to have a bowel movement, call our office or discuss with your PCP. If you have preexisting constipation or other GI issues that may predispose you to constipation, please consult with your GI provider or PCP if you have not had a bowel movement for 2 days after surgery.     Antibiotics    __X__ Cefadroxil or doxycycline  May be prescribed if needed for prophylaxis against infection   Take 7 day course of antibiotics until they are all gone  May not be prescribed if you do not meet criteria for elevated infection risk after surgery          You will not receive refills on the following medications:    Naproxen  Colace  Blood Thinner

## 2025-06-19 NOTE — TELEPHONE ENCOUNTER
Patient would like her appointment with Mamie to be changed to a Virtual appointment on 06-26-25 due to Patient having Hip Surgery on 06-23-25.    Patient can be reached at [phone]

## 2025-06-22 ENCOUNTER — HOME HEALTH ADMISSION (OUTPATIENT)
Dept: HOME HEALTH SERVICES | Facility: HOME HEALTH | Age: 70
End: 2025-06-22
Payer: MEDICARE

## 2025-06-23 ENCOUNTER — APPOINTMENT (OUTPATIENT)
Dept: RADIOLOGY | Facility: HOSPITAL | Age: 70
End: 2025-06-23
Payer: MEDICARE

## 2025-06-23 ENCOUNTER — DOCUMENTATION (OUTPATIENT)
Dept: HOME HEALTH SERVICES | Facility: HOME HEALTH | Age: 70
End: 2025-06-23
Payer: MEDICARE

## 2025-06-23 ENCOUNTER — HOSPITAL ENCOUNTER (OUTPATIENT)
Facility: HOSPITAL | Age: 70
Setting detail: OUTPATIENT SURGERY
Discharge: HOME HEALTH | End: 2025-06-23
Attending: ORTHOPAEDIC SURGERY | Admitting: ORTHOPAEDIC SURGERY
Payer: MEDICARE

## 2025-06-23 ENCOUNTER — PHARMACY VISIT (OUTPATIENT)
Dept: PHARMACY | Facility: CLINIC | Age: 70
End: 2025-06-23
Payer: COMMERCIAL

## 2025-06-23 ENCOUNTER — ANESTHESIA (OUTPATIENT)
Dept: OPERATING ROOM | Facility: HOSPITAL | Age: 70
End: 2025-06-23
Payer: MEDICARE

## 2025-06-23 VITALS
HEART RATE: 72 BPM | BODY MASS INDEX: 37.87 KG/M2 | RESPIRATION RATE: 16 BRPM | WEIGHT: 213.74 LBS | OXYGEN SATURATION: 98 % | DIASTOLIC BLOOD PRESSURE: 68 MMHG | HEIGHT: 63 IN | TEMPERATURE: 97.2 F | SYSTOLIC BLOOD PRESSURE: 104 MMHG

## 2025-06-23 DIAGNOSIS — I21.4 NON-ST ELEVATION MI (NSTEMI) (MULTI): ICD-10-CM

## 2025-06-23 DIAGNOSIS — M16.11 ARTHRITIS OF RIGHT HIP: ICD-10-CM

## 2025-06-23 DIAGNOSIS — Z01.818 PRE-OP EVALUATION: Primary | ICD-10-CM

## 2025-06-23 DIAGNOSIS — E11.9 TYPE 2 DIABETES MELLITUS WITHOUT COMPLICATION, WITHOUT LONG-TERM CURRENT USE OF INSULIN: ICD-10-CM

## 2025-06-23 LAB
GLUCOSE BLD MANUAL STRIP-MCNC: 136 MG/DL (ref 74–99)
GLUCOSE BLD MANUAL STRIP-MCNC: 146 MG/DL (ref 74–99)

## 2025-06-23 PROCEDURE — 76000 FLUOROSCOPY <1 HR PHYS/QHP: CPT

## 2025-06-23 PROCEDURE — 72170 X-RAY EXAM OF PELVIS: CPT | Performed by: RADIOLOGY

## 2025-06-23 PROCEDURE — 97110 THERAPEUTIC EXERCISES: CPT | Mod: GP

## 2025-06-23 PROCEDURE — A9999 DME SUPPLY OR ACCESSORY, NOS: HCPCS | Performed by: ORTHOPAEDIC SURGERY

## 2025-06-23 PROCEDURE — 2700000047 HC OR 270 NO HCPCS: Performed by: ORTHOPAEDIC SURGERY

## 2025-06-23 PROCEDURE — 2500000004 HC RX 250 GENERAL PHARMACY W/ HCPCS (ALT 636 FOR OP/ED)

## 2025-06-23 PROCEDURE — 97162 PT EVAL MOD COMPLEX 30 MIN: CPT | Mod: GP

## 2025-06-23 PROCEDURE — C1713 ANCHOR/SCREW BN/BN,TIS/BN: HCPCS | Performed by: ORTHOPAEDIC SURGERY

## 2025-06-23 PROCEDURE — 82947 ASSAY GLUCOSE BLOOD QUANT: CPT

## 2025-06-23 PROCEDURE — 7100000009 HC PHASE TWO TIME - INITIAL BASE CHARGE: Performed by: ORTHOPAEDIC SURGERY

## 2025-06-23 PROCEDURE — 3600000018 HC OR TIME - INITIAL BASE CHARGE - PROCEDURE LEVEL SIX: Performed by: ORTHOPAEDIC SURGERY

## 2025-06-23 PROCEDURE — 2720000007 HC OR 272 NO HCPCS: Performed by: ORTHOPAEDIC SURGERY

## 2025-06-23 PROCEDURE — P9045 ALBUMIN (HUMAN), 5%, 250 ML: HCPCS | Mod: JZ

## 2025-06-23 PROCEDURE — 99100 ANES PT EXTEME AGE<1 YR&>70: CPT | Performed by: STUDENT IN AN ORGANIZED HEALTH CARE EDUCATION/TRAINING PROGRAM

## 2025-06-23 PROCEDURE — 88311 DECALCIFY TISSUE: CPT | Mod: TC,GEALAB | Performed by: ORTHOPAEDIC SURGERY

## 2025-06-23 PROCEDURE — A6213 FOAM DRG >16<=48 SQ IN W/BDR: HCPCS | Performed by: ORTHOPAEDIC SURGERY

## 2025-06-23 PROCEDURE — 7100000010 HC PHASE TWO TIME - EACH INCREMENTAL 1 MINUTE: Performed by: ORTHOPAEDIC SURGERY

## 2025-06-23 PROCEDURE — 2500000005 HC RX 250 GENERAL PHARMACY W/O HCPCS: Performed by: NURSE ANESTHETIST, CERTIFIED REGISTERED

## 2025-06-23 PROCEDURE — 2780000003 HC OR 278 NO HCPCS: Performed by: ORTHOPAEDIC SURGERY

## 2025-06-23 PROCEDURE — A27130 PR TOTAL HIP ARTHROPLASTY: Performed by: NURSE ANESTHETIST, CERTIFIED REGISTERED

## 2025-06-23 PROCEDURE — 2500000004 HC RX 250 GENERAL PHARMACY W/ HCPCS (ALT 636 FOR OP/ED): Performed by: ANESTHESIOLOGY

## 2025-06-23 PROCEDURE — 3700000002 HC GENERAL ANESTHESIA TIME - EACH INCREMENTAL 1 MINUTE: Performed by: ORTHOPAEDIC SURGERY

## 2025-06-23 PROCEDURE — 2500000005 HC RX 250 GENERAL PHARMACY W/O HCPCS: Performed by: ORTHOPAEDIC SURGERY

## 2025-06-23 PROCEDURE — 7100000002 HC RECOVERY ROOM TIME - EACH INCREMENTAL 1 MINUTE: Performed by: ORTHOPAEDIC SURGERY

## 2025-06-23 PROCEDURE — A27130 PR TOTAL HIP ARTHROPLASTY: Performed by: STUDENT IN AN ORGANIZED HEALTH CARE EDUCATION/TRAINING PROGRAM

## 2025-06-23 PROCEDURE — C1776 JOINT DEVICE (IMPLANTABLE): HCPCS | Performed by: ORTHOPAEDIC SURGERY

## 2025-06-23 PROCEDURE — 3700000001 HC GENERAL ANESTHESIA TIME - INITIAL BASE CHARGE: Performed by: ORTHOPAEDIC SURGERY

## 2025-06-23 PROCEDURE — 2500000004 HC RX 250 GENERAL PHARMACY W/ HCPCS (ALT 636 FOR OP/ED): Mod: JZ | Performed by: ORTHOPAEDIC SURGERY

## 2025-06-23 PROCEDURE — 88304 TISSUE EXAM BY PATHOLOGIST: CPT | Performed by: PATHOLOGY

## 2025-06-23 PROCEDURE — 27130 TOTAL HIP ARTHROPLASTY: CPT

## 2025-06-23 PROCEDURE — 3600000017 HC OR TIME - EACH INCREMENTAL 1 MINUTE - PROCEDURE LEVEL SIX: Performed by: ORTHOPAEDIC SURGERY

## 2025-06-23 PROCEDURE — 72170 X-RAY EXAM OF PELVIS: CPT

## 2025-06-23 PROCEDURE — 7100000001 HC RECOVERY ROOM TIME - INITIAL BASE CHARGE: Performed by: ORTHOPAEDIC SURGERY

## 2025-06-23 PROCEDURE — 27130 TOTAL HIP ARTHROPLASTY: CPT | Performed by: ORTHOPAEDIC SURGERY

## 2025-06-23 PROCEDURE — 2500000001 HC RX 250 WO HCPCS SELF ADMINISTERED DRUGS (ALT 637 FOR MEDICARE OP): Performed by: ORTHOPAEDIC SURGERY

## 2025-06-23 DEVICE — CERAMIC V40 FEMORAL HEAD
Type: IMPLANTABLE DEVICE | Site: HIP | Status: FUNCTIONAL
Brand: BIOLOX

## 2025-06-23 DEVICE — TRIDENT II TRITANIUM CLUSTER 48D
Type: IMPLANTABLE DEVICE | Site: HIP | Status: FUNCTIONAL
Brand: TRIDENT II

## 2025-06-23 DEVICE — LINER- CEMENTLESS
Type: IMPLANTABLE DEVICE | Site: HIP | Status: FUNCTIONAL
Brand: MDM

## 2025-06-23 DEVICE — 127 DEGREE NECK ANGLE HIP STEM
Type: IMPLANTABLE DEVICE | Site: HIP | Status: FUNCTIONAL
Brand: ACCOLADE

## 2025-06-23 DEVICE — X3 INSERT FOR MDM LINER
Type: IMPLANTABLE DEVICE | Site: HIP | Status: FUNCTIONAL
Brand: MDM X3 INSERT

## 2025-06-23 RX ORDER — DULOXETIN HYDROCHLORIDE 30 MG/1
30 CAPSULE, DELAYED RELEASE ORAL NIGHTLY
Status: CANCELLED | OUTPATIENT
Start: 2025-06-23

## 2025-06-23 RX ORDER — ONDANSETRON 4 MG/1
4 TABLET, FILM COATED ORAL EVERY 8 HOURS PRN
Status: CANCELLED | OUTPATIENT
Start: 2025-06-23

## 2025-06-23 RX ORDER — METOPROLOL SUCCINATE 50 MG/1
50 TABLET, EXTENDED RELEASE ORAL DAILY
Status: CANCELLED | OUTPATIENT
Start: 2025-06-24

## 2025-06-23 RX ORDER — FORMOTEROL FUMARATE 20 UG/2ML
20 SOLUTION RESPIRATORY (INHALATION)
Status: CANCELLED | OUTPATIENT
Start: 2025-06-23

## 2025-06-23 RX ORDER — TRANEXAMIC ACID 10 MG/ML
INJECTION, SOLUTION INTRAVENOUS AS NEEDED
Status: DISCONTINUED | OUTPATIENT
Start: 2025-06-23 | End: 2025-06-23

## 2025-06-23 RX ORDER — MIDAZOLAM HYDROCHLORIDE 1 MG/ML
INJECTION, SOLUTION INTRAMUSCULAR; INTRAVENOUS AS NEEDED
Status: DISCONTINUED | OUTPATIENT
Start: 2025-06-23 | End: 2025-06-23

## 2025-06-23 RX ORDER — CEFAZOLIN SODIUM 2 G/100ML
2 INJECTION, SOLUTION INTRAVENOUS ONCE
Status: DISCONTINUED | OUTPATIENT
Start: 2025-06-23 | End: 2025-06-23 | Stop reason: HOSPADM

## 2025-06-23 RX ORDER — INSULIN LISPRO 100 [IU]/ML
0-5 INJECTION, SOLUTION INTRAVENOUS; SUBCUTANEOUS
Status: CANCELLED | OUTPATIENT
Start: 2025-06-23

## 2025-06-23 RX ORDER — ONDANSETRON HYDROCHLORIDE 2 MG/ML
INJECTION, SOLUTION INTRAVENOUS AS NEEDED
Status: DISCONTINUED | OUTPATIENT
Start: 2025-06-23 | End: 2025-06-23

## 2025-06-23 RX ORDER — DEXTROMETHORPHAN HYDROBROMIDE, GUAIFENESIN 5; 100 MG/5ML; MG/5ML
1300 LIQUID ORAL EVERY 8 HOURS PRN
COMMUNITY
End: 2025-06-23 | Stop reason: HOSPADM

## 2025-06-23 RX ORDER — INSULIN GLARGINE 100 [IU]/ML
10 INJECTION, SOLUTION SUBCUTANEOUS NIGHTLY
Status: CANCELLED | OUTPATIENT
Start: 2025-06-23

## 2025-06-23 RX ORDER — PROPOFOL 10 MG/ML
INJECTION, EMULSION INTRAVENOUS AS NEEDED
Status: DISCONTINUED | OUTPATIENT
Start: 2025-06-23 | End: 2025-06-23

## 2025-06-23 RX ORDER — SODIUM CHLORIDE 0.9 G/100ML
INJECTION, SOLUTION IRRIGATION AS NEEDED
Status: DISCONTINUED | OUTPATIENT
Start: 2025-06-23 | End: 2025-06-23 | Stop reason: HOSPADM

## 2025-06-23 RX ORDER — OXYCODONE HYDROCHLORIDE 5 MG/1
10 TABLET ORAL EVERY 4 HOURS PRN
Refills: 0 | Status: CANCELLED | OUTPATIENT
Start: 2025-06-23

## 2025-06-23 RX ORDER — ALBUTEROL SULFATE 0.83 MG/ML
2.5 SOLUTION RESPIRATORY (INHALATION) ONCE AS NEEDED
Status: DISCONTINUED | OUTPATIENT
Start: 2025-06-23 | End: 2025-06-23 | Stop reason: HOSPADM

## 2025-06-23 RX ORDER — GABAPENTIN 300 MG/1
600 CAPSULE ORAL 3 TIMES DAILY
Status: CANCELLED | OUTPATIENT
Start: 2025-06-23

## 2025-06-23 RX ORDER — CEFAZOLIN SODIUM 2 G/100ML
2 INJECTION, SOLUTION INTRAVENOUS EVERY 6 HOURS
Status: CANCELLED | OUTPATIENT
Start: 2025-06-23 | End: 2025-06-24

## 2025-06-23 RX ORDER — FOLIC ACID 1 MG/1
1 TABLET ORAL DAILY
Status: CANCELLED | OUTPATIENT
Start: 2025-06-24

## 2025-06-23 RX ORDER — ALBUTEROL SULFATE 0.83 MG/ML
2.5 SOLUTION RESPIRATORY (INHALATION) EVERY 6 HOURS PRN
Status: CANCELLED | OUTPATIENT
Start: 2025-06-23

## 2025-06-23 RX ORDER — CELECOXIB 400 MG/1
400 CAPSULE ORAL ONCE
Status: COMPLETED | OUTPATIENT
Start: 2025-06-23 | End: 2025-06-23

## 2025-06-23 RX ORDER — NORETHINDRONE AND ETHINYL ESTRADIOL 0.5-0.035
KIT ORAL AS NEEDED
Status: DISCONTINUED | OUTPATIENT
Start: 2025-06-23 | End: 2025-06-23

## 2025-06-23 RX ORDER — DEXTROSE 50 % IN WATER (D50W) INTRAVENOUS SYRINGE
25
Status: CANCELLED | OUTPATIENT
Start: 2025-06-23

## 2025-06-23 RX ORDER — RANOLAZINE 500 MG/1
500 TABLET, EXTENDED RELEASE ORAL 2 TIMES DAILY
Status: CANCELLED | OUTPATIENT
Start: 2025-06-23

## 2025-06-23 RX ORDER — ONDANSETRON HYDROCHLORIDE 2 MG/ML
4 INJECTION, SOLUTION INTRAVENOUS EVERY 8 HOURS PRN
Status: CANCELLED | OUTPATIENT
Start: 2025-06-23

## 2025-06-23 RX ORDER — DEXTROSE 50 % IN WATER (D50W) INTRAVENOUS SYRINGE
12.5
Status: CANCELLED | OUTPATIENT
Start: 2025-06-23

## 2025-06-23 RX ORDER — ACETAMINOPHEN 325 MG/1
650 TABLET ORAL EVERY 6 HOURS SCHEDULED
Status: CANCELLED | OUTPATIENT
Start: 2025-06-23

## 2025-06-23 RX ORDER — BUPIVACAINE HYDROCHLORIDE 7.5 MG/ML
INJECTION INTRAVENOUS AS NEEDED
Status: DISCONTINUED | OUTPATIENT
Start: 2025-06-23 | End: 2025-06-23

## 2025-06-23 RX ORDER — POLYETHYLENE GLYCOL 3350 17 G/17G
17 POWDER, FOR SOLUTION ORAL DAILY
Status: CANCELLED | OUTPATIENT
Start: 2025-06-23

## 2025-06-23 RX ORDER — TIRZEPATIDE 10 MG/.5ML
10 INJECTION, SOLUTION SUBCUTANEOUS
Start: 2025-07-03 | End: 2025-06-26 | Stop reason: SDUPTHER

## 2025-06-23 RX ORDER — VASOPRESSIN 20 [USP'U]/ML
INJECTION, SOLUTION INTRAVENOUS AS NEEDED
Status: DISCONTINUED | OUTPATIENT
Start: 2025-06-23 | End: 2025-06-23

## 2025-06-23 RX ORDER — LIDOCAINE HYDROCHLORIDE 10 MG/ML
INJECTION, SOLUTION EPIDURAL; INFILTRATION; INTRACAUDAL; PERINEURAL AS NEEDED
Status: DISCONTINUED | OUTPATIENT
Start: 2025-06-23 | End: 2025-06-23

## 2025-06-23 RX ORDER — PANTOPRAZOLE SODIUM 40 MG/1
40 TABLET, DELAYED RELEASE ORAL
Status: CANCELLED | OUTPATIENT
Start: 2025-06-24

## 2025-06-23 RX ORDER — ACETAMINOPHEN 325 MG/1
650 TABLET ORAL EVERY 4 HOURS PRN
Status: DISCONTINUED | OUTPATIENT
Start: 2025-06-23 | End: 2025-06-23 | Stop reason: HOSPADM

## 2025-06-23 RX ORDER — EZETIMIBE 10 MG/1
10 TABLET ORAL NIGHTLY
Status: CANCELLED | OUTPATIENT
Start: 2025-06-23

## 2025-06-23 RX ORDER — SODIUM CHLORIDE, SODIUM LACTATE, POTASSIUM CHLORIDE, CALCIUM CHLORIDE 600; 310; 30; 20 MG/100ML; MG/100ML; MG/100ML; MG/100ML
100 INJECTION, SOLUTION INTRAVENOUS CONTINUOUS
Status: DISCONTINUED | OUTPATIENT
Start: 2025-06-23 | End: 2025-06-23 | Stop reason: HOSPADM

## 2025-06-23 RX ORDER — ONDANSETRON HYDROCHLORIDE 2 MG/ML
8 INJECTION, SOLUTION INTRAVENOUS ONCE
Status: DISCONTINUED | OUTPATIENT
Start: 2025-06-23 | End: 2025-06-23 | Stop reason: HOSPADM

## 2025-06-23 RX ORDER — OXYCODONE HYDROCHLORIDE 5 MG/1
5 TABLET ORAL EVERY 4 HOURS PRN
Refills: 0 | Status: CANCELLED | OUTPATIENT
Start: 2025-06-23

## 2025-06-23 RX ORDER — SODIUM CHLORIDE, SODIUM LACTATE, POTASSIUM CHLORIDE, CALCIUM CHLORIDE 600; 310; 30; 20 MG/100ML; MG/100ML; MG/100ML; MG/100ML
100 INJECTION, SOLUTION INTRAVENOUS CONTINUOUS
Status: ACTIVE | OUTPATIENT
Start: 2025-06-23 | End: 2025-06-23

## 2025-06-23 RX ORDER — ACETAMINOPHEN 325 MG/1
975 TABLET ORAL ONCE
Status: COMPLETED | OUTPATIENT
Start: 2025-06-23 | End: 2025-06-23

## 2025-06-23 RX ORDER — FENTANYL CITRATE 50 UG/ML
INJECTION, SOLUTION INTRAMUSCULAR; INTRAVENOUS AS NEEDED
Status: DISCONTINUED | OUTPATIENT
Start: 2025-06-23 | End: 2025-06-23

## 2025-06-23 RX ORDER — BUDESONIDE 0.5 MG/2ML
0.5 INHALANT ORAL
Status: CANCELLED | OUTPATIENT
Start: 2025-06-23

## 2025-06-23 RX ORDER — NALOXONE HYDROCHLORIDE 0.4 MG/ML
0.2 INJECTION, SOLUTION INTRAMUSCULAR; INTRAVENOUS; SUBCUTANEOUS EVERY 5 MIN PRN
Status: CANCELLED | OUTPATIENT
Start: 2025-06-23

## 2025-06-23 RX ORDER — DOCUSATE SODIUM 100 MG/1
100 CAPSULE, LIQUID FILLED ORAL 2 TIMES DAILY
Status: CANCELLED | OUTPATIENT
Start: 2025-06-23

## 2025-06-23 RX ORDER — MORPHINE SULFATE 2 MG/ML
2 INJECTION, SOLUTION INTRAMUSCULAR; INTRAVENOUS EVERY 4 HOURS PRN
Status: CANCELLED | OUTPATIENT
Start: 2025-06-23

## 2025-06-23 RX ORDER — CEFAZOLIN 1 G/1
INJECTION, POWDER, FOR SOLUTION INTRAVENOUS AS NEEDED
Status: DISCONTINUED | OUTPATIENT
Start: 2025-06-23 | End: 2025-06-23

## 2025-06-23 RX ORDER — ALBUMIN HUMAN 50 G/1000ML
SOLUTION INTRAVENOUS AS NEEDED
Status: DISCONTINUED | OUTPATIENT
Start: 2025-06-23 | End: 2025-06-23

## 2025-06-23 RX ADMIN — VASOPRESSIN 1 UNITS: 20 INJECTION INTRAVENOUS at 13:06

## 2025-06-23 RX ADMIN — TRANEXAMIC ACID 1000 MG: 10 INJECTION, SOLUTION INTRAVENOUS at 12:08

## 2025-06-23 RX ADMIN — EPHEDRINE SULFATE 5 MG: 50 INJECTION, SOLUTION INTRAVENOUS at 13:13

## 2025-06-23 RX ADMIN — CEFAZOLIN 2 G: 330 INJECTION, POWDER, FOR SOLUTION INTRAMUSCULAR; INTRAVENOUS at 12:07

## 2025-06-23 RX ADMIN — EPHEDRINE SULFATE 5 MG: 50 INJECTION, SOLUTION INTRAVENOUS at 12:52

## 2025-06-23 RX ADMIN — VASOPRESSIN 2 UNITS: 20 INJECTION INTRAVENOUS at 13:54

## 2025-06-23 RX ADMIN — POVIDONE-IODINE 1 APPLICATION: 5 SOLUTION TOPICAL at 09:45

## 2025-06-23 RX ADMIN — FENTANYL CITRATE 25 MCG: 50 INJECTION, SOLUTION INTRAMUSCULAR; INTRAVENOUS at 11:58

## 2025-06-23 RX ADMIN — VASOPRESSIN 2 UNITS: 20 INJECTION INTRAVENOUS at 12:58

## 2025-06-23 RX ADMIN — PROPOFOL 75 MCG/KG/MIN: 10 INJECTION, EMULSION INTRAVENOUS at 12:11

## 2025-06-23 RX ADMIN — VASOPRESSIN 2 UNITS: 20 INJECTION INTRAVENOUS at 12:41

## 2025-06-23 RX ADMIN — DEXAMETHASONE SODIUM PHOSPHATE 4 MG: 4 INJECTION INTRA-ARTICULAR; INTRALESIONAL; INTRAMUSCULAR; INTRAVENOUS; SOFT TISSUE at 12:08

## 2025-06-23 RX ADMIN — VASOPRESSIN 1 UNITS: 20 INJECTION INTRAVENOUS at 12:25

## 2025-06-23 RX ADMIN — EPHEDRINE SULFATE 10 MG: 50 INJECTION, SOLUTION INTRAVENOUS at 12:56

## 2025-06-23 RX ADMIN — ONDANSETRON 4 MG: 2 INJECTION, SOLUTION INTRAMUSCULAR; INTRAVENOUS at 13:10

## 2025-06-23 RX ADMIN — SODIUM CHLORIDE, POTASSIUM CHLORIDE, SODIUM LACTATE AND CALCIUM CHLORIDE 100 ML/HR: 600; 310; 30; 20 INJECTION, SOLUTION INTRAVENOUS at 10:03

## 2025-06-23 RX ADMIN — SODIUM CHLORIDE, POTASSIUM CHLORIDE, SODIUM LACTATE AND CALCIUM CHLORIDE: 600; 310; 30; 20 INJECTION, SOLUTION INTRAVENOUS at 11:52

## 2025-06-23 RX ADMIN — MIDAZOLAM 1 MG: 1 INJECTION INTRAMUSCULAR; INTRAVENOUS at 11:58

## 2025-06-23 RX ADMIN — LIDOCAINE HYDROCHLORIDE 30 MG: 10 INJECTION, SOLUTION EPIDURAL; INFILTRATION; INTRACAUDAL; PERINEURAL at 12:10

## 2025-06-23 RX ADMIN — CELECOXIB 400 MG: 400 CAPSULE ORAL at 09:44

## 2025-06-23 RX ADMIN — VASOPRESSIN 1 UNITS: 20 INJECTION INTRAVENOUS at 12:37

## 2025-06-23 RX ADMIN — VASOPRESSIN 1 UNITS: 20 INJECTION INTRAVENOUS at 12:13

## 2025-06-23 RX ADMIN — ACETAMINOPHEN 975 MG: 325 TABLET, FILM COATED ORAL at 09:43

## 2025-06-23 RX ADMIN — VASOPRESSIN 2 UNITS: 20 INJECTION INTRAVENOUS at 13:13

## 2025-06-23 RX ADMIN — EPHEDRINE SULFATE 5 MG: 50 INJECTION, SOLUTION INTRAVENOUS at 12:46

## 2025-06-23 RX ADMIN — ALBUMIN HUMAN 250 ML: 0.05 INJECTION, SOLUTION INTRAVENOUS at 12:30

## 2025-06-23 RX ADMIN — PROPOFOL 30 MG: 10 INJECTION, EMULSION INTRAVENOUS at 12:10

## 2025-06-23 RX ADMIN — EPHEDRINE SULFATE 5 MG: 50 INJECTION, SOLUTION INTRAVENOUS at 13:05

## 2025-06-23 RX ADMIN — BUPIVACAINE HYDROCHLORIDE IN DEXTROSE 1.6 ML: 7.5 INJECTION, SOLUTION SUBARACHNOID at 12:06

## 2025-06-23 RX ADMIN — TRANEXAMIC ACID 1000 MG: 10 INJECTION, SOLUTION INTRAVENOUS at 13:09

## 2025-06-23 ASSESSMENT — COGNITIVE AND FUNCTIONAL STATUS - GENERAL
MOVING TO AND FROM BED TO CHAIR: A LITTLE
STANDING UP FROM CHAIR USING ARMS: A LITTLE
WALKING IN HOSPITAL ROOM: A LITTLE
MOBILITY SCORE: 20
CLIMB 3 TO 5 STEPS WITH RAILING: A LITTLE

## 2025-06-23 ASSESSMENT — PAIN SCALES - GENERAL
PAINLEVEL_OUTOF10: 0 - NO PAIN

## 2025-06-23 ASSESSMENT — COLUMBIA-SUICIDE SEVERITY RATING SCALE - C-SSRS
6. HAVE YOU EVER DONE ANYTHING, STARTED TO DO ANYTHING, OR PREPARED TO DO ANYTHING TO END YOUR LIFE?: NO
1. IN THE PAST MONTH, HAVE YOU WISHED YOU WERE DEAD OR WISHED YOU COULD GO TO SLEEP AND NOT WAKE UP?: NO
2. HAVE YOU ACTUALLY HAD ANY THOUGHTS OF KILLING YOURSELF?: NO

## 2025-06-23 ASSESSMENT — PAIN - FUNCTIONAL ASSESSMENT
PAIN_FUNCTIONAL_ASSESSMENT: 0-10
PAIN_FUNCTIONAL_ASSESSMENT: 0-10

## 2025-06-23 NOTE — ANESTHESIA PREPROCEDURE EVALUATION
"Patient: Katya Kaplan \"Mariam\"    Procedure Information       Anesthesia Start Date/Time: 06/23/25 1152    Procedure: ARTHROPLASTY, HIP, TOTAL, ANTERIOR APPROACH (Right: Hip) - lianna instruments, direct anterior approach, hana table, c-arm    Location: [facility] / Virtual [facility] OR    Surgeons: Zbigniew De Los Santos MD            Relevant Problems   Cardiac   (+) Angina pectoris   (+) Atrial fibrillation (Multi)   (+) Benign essential hypertension   (+) Coronary arteriosclerosis   (+) Electrocardiogram abnormal   (+) HTN (hypertension)   (+) Hyperlipidemia   (+) Mixed hyperlipidemia   (+) Non-ST elevation MI (NSTEMI) (Multi)      Pulmonary   (+) PE (pulmonary thromboembolism) (Multi)      /Renal   (+) Urinary tract infection      Endocrine   (+) Diabetes mellitus type 2 in obese   (+) Morbidly obese (Multi)   (+) Nontoxic single thyroid nodule   (+) Obesity   (+) Type 2 diabetes mellitus without complications      Hematology   (+) PE (pulmonary thromboembolism) (Multi)      Musculoskeletal   (+) Lumbar stenosis with neurogenic claudication   (+) Osteoarthritis   (+) Osteoarthritis of right hip, unspecified osteoarthritis type      ID   (+) Urinary tract infection      GYN   (+) Endometrial cancer (Multi)       Clinical information reviewed:   Tobacco  Allergies  Meds   Med Hx  Surg Hx   Fam Hx  Soc Hx        NPO Detail:  NPO/Void Status  Date of Last Liquid: 06/22/25  Date of Last Solid: 06/21/25  Time of Last Solid: 2000         Physical Exam    Airway  Mallampati: III  TM distance: >3 FB  Neck ROM: full  Mouth opening: 3 or more finger widths     Cardiovascular - normal exam   Dental     (+) upper dentures, lower dentures     Pulmonary - normal exam   Abdominal (+) obese           Anesthesia Plan    History of general anesthesia?: yes  History of complications of general anesthesia?: no    ASA 3     spinal     intravenous induction   Postoperative pain plan includes opioids.  Anesthetic plan and risks discussed " with patient.  Use of blood products discussed with patient who consented to blood products.    Plan discussed with CRNA and attending.

## 2025-06-23 NOTE — ANESTHESIA POSTPROCEDURE EVALUATION
"Patient: Katya Kaplan \"Mariam\"    Procedure Summary       Date: 06/23/25 Room / Location: [facility] OR 01 / [facility]    Anesthesia Start: 1152 Anesthesia Stop: 1358    Procedure: ARTHROPLASTY, HIP, TOTAL, ANTERIOR APPROACH (Right: Hip) Diagnosis:       Arthritis of right hip      (Arthritis of right hip [M16.11])    Surgeons: Zbigniew De Los Santos MD Responsible Provider: Idalmis Rosas MD    Anesthesia Type: general ASA Status: 3            Anesthesia Type: general    Vitals Value Taken Time   BP 85/62 06/23/25 14:30   Temp 36.2 °C (97.2 °F) 06/23/25 13:49   Pulse 70 06/23/25 14:30   Resp 15 06/23/25 14:30   SpO2 96 % 06/23/25 14:30       Anesthesia Post Evaluation    Patient location during evaluation: PACU  Patient participation: complete - patient participated  Level of consciousness: awake and alert  Pain management: adequate  Airway patency: patent  Cardiovascular status: acceptable  Respiratory status: acceptable  Hydration status: acceptable  Postoperative Nausea and Vomiting: none  Comments: Hypotensive but asymptomatic. No signs of orthostatics. Able to eat/drink, urinating. No signs of mal perfusion.          There were no known notable events for this encounter.    "

## 2025-06-23 NOTE — ANESTHESIA PROCEDURE NOTES
Spinal Block    Patient location during procedure: OR  Start time: 6/23/2025 11:59 AM  End time: 6/23/2025 12:06 PM  Reason for block: primary anesthetic  Staffing  Performed: SRNA and CRNA   Authorized by: Idalmis Rosas MD    Performed by: Gilmer Davis    Preanesthetic Checklist  Completed: patient identified, IV checked, risks and benefits discussed, surgical consent, monitors and equipment checked, pre-op evaluation, timeout performed and sterile techniques followed  Block Timeout  RN/Licensed healthcare professional reads aloud to the Anesthesia provider and entire team: Patient identity, procedure with side and site, patient position, and as applicable the availability of implants/special equipment/special requirements.    Timeout performed at: 6/23/2025 11:59 AM  Spinal Block  Patient position: sitting  Prep: Betadine  Sterility prep: hand hygiene, mask, gloves, drape and cap  Sedation level: light sedation  Patient monitoring: blood pressure, continuous pulse oximetry and heart rate  Approach: midline  Vertebral space: L3-4  Needle  Needle type: pencil-point   Needle gauge: 24 G  Needle length: 3.5 in  Free flowing CSF: yes    Assessment  Block outcome: patient comfortable  Procedure assessment: patient tolerated procedure well with no immediate complications

## 2025-06-23 NOTE — PROGRESS NOTES
"Physical Therapy    Physical Therapy Evaluation    Patient Name: Katya Kaplan \"Mariam\"  MRN: [id number]  Department:   Room: [facility] OR  Today's Date: 6/23/2025   Time Calculation  Start Time: 1607  Stop Time: 1650  Time Calculation (min): 43 min    Assessment/Plan   PT Assessment  PT Assessment Results: Decreased mobility, Orthopedic restrictions, Obesity  Rehab Prognosis: Good  Barriers to Discharge Home: No anticipated barriers  Evaluation/Treatment Tolerance: Patient tolerated treatment well  Medical Staff Made Aware: Yes  Strengths: Ability to acquire knowledge  Barriers to Participation: Comorbidities  End of Session Communication: Bedside nurse  End of Session Patient Position:  (Pt up in her wheelchair waiting for her RN to remove her IV port and then D/C home with her family)     PT Plan  PT Eval Only Reason: Safe to return home  PT Discharge Recommendations: Low intensity level of continued care  Equipment Recommended upon Discharge: Wheeled walker  PT Recommended Transfer Status: Assist x1  PT - OK to Discharge: Yes (LOW follow up services)    Subjective     PT Visit Info:  PT Received On: 06/23/25  General Visit Information:  General  Reason for Referral: 69 yo female admitted 2' to R hip OA, s/p DAA R MICHELLE  Referred By: Dr. KD De Los Santos  Past Medical History Relevant to Rehab: OA, A Fib, HTN, COPD, low back pain, DM with neuropathy, spinal stenosis  Family/Caregiver Present: Yes (Son and spouse)  Caregiver Feedback:  (Son helping with PLOF history)  Prior to Session Communication: Bedside nurse  Patient Position Received: Bed, 3 rail up, Alarm off, not on at start of session  Preferred Learning Style: verbal, visual, written  General Comment:  (Pt is pleasant and agreeable to work with PT. PT went over Pt's MICHELLE precautions and Pt performed her MICHELLE protocol ex's. Pt doesn't need further PT services here as she is D/C home with her family today. Recommend LOW follow up services)  Home Living:  Home " Living  Type of Home: Apartment  Lives With: Spouse (senior apartment complex)  Home Adaptive Equipment: Walker rolling or standard, Cane (rollator)  Home Layout: One level  Home Access: Level entry  Bathroom Shower/Tub: Walk-in shower  Bathroom Equipment: Grab bars in shower, Shower chair with back, Raised toilet seat without rails  Prior Level of Function:  Prior Function Per Pt/Caregiver Report  Level of Rice: Independent with ADLs and functional transfers, Independent with homemaking with ambulation (rollator)  Receives Help From: Family  Ambulatory Assistance: Independent  Precautions:  Precautions  LE Weight Bearing Status: Weight Bearing as Tolerated (R LE)  Post-Surgical Precautions: Right hip precautions             Objective   Pain:  Pain Assessment  Pain Assessment: 0-10  0-10 (Numeric) Pain Score: 0 - No pain  Cognition:  Cognition  Overall Cognitive Status: Within Functional Limits    General Assessments:  General Observation  General Observation: Pt performed the follow ing supine ex's; B AP, R QS. glut sets, R heel slides, R hip abd and R SAQ each x 20 reps               Activity Tolerance  Endurance: Endurance does not limit participation in activity    Sensation  Light Touch: No apparent deficits    Strength  Strength Comments: B UE and LE are 4+ of 5  Static Sitting Balance  Static Sitting-Balance Support: Bilateral upper extremity supported  Static Sitting-Level of Assistance: Distant supervision    Static Standing Balance  Static Standing-Balance Support: Bilateral upper extremity supported (FWW)  Static Standing-Level of Assistance: Close supervision  Dynamic Standing Balance  Dynamic Standing-Balance Support: Bilateral upper extremity supported (FWW)  Dynamic Standing-Level of Assistance: Close supervision  Functional Assessments:  Bed Mobility  Bed Mobility: Yes  Bed Mobility 1  Bed Mobility 1: Supine to sitting, Sitting to supine  Level of Assistance 1: Close  supervision    Transfers  Transfer: Yes  Transfer 1  Transfer From 1: Bed to  Transfer to 1: Stand  Technique 1: Sit to stand, Stand to sit  Transfer Device 1:  (FWW)  Transfer Level of Assistance 1: Close supervision    Ambulation/Gait Training  Ambulation/Gait Training Performed: Yes  Ambulation/Gait Training 1  Surface 1: Level tile  Device 1: Rolling walker  Assistance 1: Close supervision  Quality of Gait 1: Decreased step length (Decreased honey)  Comments/Distance (ft) 1: 80 feet    Stairs  Stairs: No (Pt declining steps 2' to her not having any)  Extremity/Trunk Assessments:  RUE   RUE : Within Functional Limits  LUE   LUE: Within Functional Limits  RLE   RLE : Within Functional Limits  LLE   LLE : Within Functional Limits  Outcome Measures:  [facility] Basic Mobility  Turning from your back to your side while in a flat bed without using bedrails: None  Moving from lying on your back to sitting on the side of a flat bed without using bedrails: None  Moving to and from bed to chair (including a wheelchair): A little  Standing up from a chair using your arms (e.g. wheelchair or bedside chair): A little  To walk in hospital room: A little  Climbing 3-5 steps with railing: A little  Basic Mobility - Total Score: 20        Education Documentation  Handouts, taught by Irineo Stevens PT at 6/23/2025  6:23 PM.  Learner: Patient  Readiness: Eager  Method: Demonstration, Handout  Response: Demonstrated Understanding    Precautions, taught by Irineo Stevens, PT at 6/23/2025  6:23 PM.  Learner: Patient  Readiness: Eager  Method: Demonstration, Handout  Response: Demonstrated Understanding    Home Exercise Program, taught by Irineo Stevens PT at 6/23/2025  6:23 PM.  Learner: Patient  Readiness: Eager  Method: Demonstration, Handout  Response: Demonstrated Understanding    Mobility Training, taught by Irineo Stevens PT at 6/23/2025  6:23 PM.  Learner: Patient  Readiness: Eager  Method: Demonstration, Handout  Response:  Demonstrated Understanding    Education Comments  No comments found.

## 2025-06-23 NOTE — OP NOTE
"ARTHROPLASTY, HIP, TOTAL, ANTERIOR APPROACH (R) Operative Note     Date: 2025  OR Location: [facility] OR    Name: Katya Kaplan \"Mariam\", : 1955, Age: 70 y.o., MRN: [id number], Sex: female    Diagnosis  Pre-op Diagnosis      * Arthritis of right hip [M16.11] Post-op Diagnosis     * Arthritis of right hip [M16.11]     Procedures  ARTHROPLASTY, HIP, TOTAL, ANTERIOR APPROACH  54443 - RI ARTHRP ACETBLR/PROX FEM PROSTC AGRFT/ALGRFT      Surgeons      * Zbigniew De Los Santos - Primary    Resident/Fellow/Other Assistant:  Surgeons and Role:     * Gil Francisco PA-C - Assisting    Staff:   Carrollulator: Etelvina GUERRIER: Jose Arango Person: Shahriar    Anesthesia Staff: Anesthesiologist: Idalmis Rosas MD  CRNA: Claire Maria APRN-CRNA  SRNA: Gilmer Davis    Procedure Summary  Anesthesia: Anesthesia type not filed in the log.  ASA: ASA status not filed in the log.  Estimated Blood Loss: 250mL  Intra-op Medications:   Administrations occurring from 1025 to 1320 on 25:   Medication Name Total Dose   EPINEPHrine HCl (PF) (Adrenalin) 0.2 mL, morphine PF (Duramorph) 1 mg/mL 5 mg syringe 6 mL   ketorolac (Toradol) 30 mg, ropivacaine (Naropin) 5 mg/mL (0.5 %) 30 mL in sodium chloride 0.9% 20 mL syringe 51 mL   sodium chloride 0.9 % irrigation solution 3,000 mL   albumin human 5 % 250 mL   bupivacaine PF 0.75 %-dextrose 8.25 % (Sensorcaine) intrathecal 1.6 mL   ceFAZolin (Ancef) 1 g 2 g   dexAMETHasone (Decadron) 4 mg/mL IV Syringe 2 mL 4 mg   ePHEDrine injection 30 mg   fentaNYL (Sublimaze) injection 50 mcg/mL 25 mcg   LR bolus Cannot be calculated   lidocaine PF (Xylocaine-MPF) local injection 1 % 30 mg   midazolam (Versed) injection 1 mg/mL 1 mg   ondansetron (Zofran) 2 mg/mL injection 4 mg   propofol (Diprivan) injection 10 mg/mL 531.98 mg   tranexamic acid 1,000 mg/100 mL NS (premix) 2,000 mg   vasopressin (Vasostrict) injection 20 units 10 Units              Anesthesia Record               Intraprocedure I/O Totals  "         Intake    Tranexamic Acid 0.00 mL    The total shown is the total volume documented since Anesthesia Start was filed.    Total Intake 0 mL       Output    Est. Blood Loss 250 mL    Total Output 250 mL       Net    Net Volume -250 mL          Specimen:   ID Type Source Tests Collected by Time   1 : RIGHT FEMORAL HEAD Tissue FEMORAL HEAD RIGHT SURGICAL PATHOLOGY EXAM Zbigniew De Los Santos MD 6/23/2025 1234                 Drains and/or Catheters: * None in log *    Tourniquet Times:         Implants:  Implants       Type Name Action Serial No.      Joint Hip LINER, COCR, MDM, 38MM D - [id number] Implanted      Joint Hip SHELL, TRIDENT II, TRITANIUM, CLUSTERHOLE, 48D - [id number] Implanted      Joint HEAD, FEMUR V40 28MM -4MM BIOLOX DELTA - [id number] Implanted       X3 MDM LINER Implanted      Joint STEM, FEM ACCOLADE + TMZF SYS 127D SZ4 - [id number] Implanted               Findings: Severe DJD    Indications: Mariam Kaplan is an 70 y.o. female who is having surgery for Arthritis of right hip [M16.11].     The patient was seen in the preoperative area. The risks, benefits, complications, treatment options, non-operative alternatives, expected recovery and outcomes were discussed with the patient. The possibilities of reaction to medication, pulmonary aspiration, injury to surrounding structures, bleeding, recurrent infection, the need for additional procedures, failure to diagnose a condition, and creating a complication requiring transfusion or operation were discussed with the patient. The patient concurred with the proposed plan, giving informed consent.  The site of surgery was properly noted/marked if necessary per policy. The patient has been actively warmed in preoperative area. Preoperative antibiotics have been ordered and given within 1 hours of incision. Venous thrombosis prophylaxis have been ordered including bilateral sequential compression devices and chemical prophylaxis    Procedure Details:  Statement of medical necessity:    This is a 70 y.o. female with severe degenerative disease of the hip that has failed non operative management. the risks, benefits and alternatives of total hip arthroplasty were discussed with the patient and they signed informed consent.     Surgical assistants, as listed above, including residents if listed, were involved in the procedure. They are trained and qualified to assist in the procedure. Prior to the procedure they assisted with patient positioning, setup, and surgical skin preparation. During the procedure, they actively assisted Dr. De Los Santos in completing the operation safely and expeditiously by helping to provide exposure, retract tissues, maintain hemostasis, closure, and any other necessary technical tasks.     No qualified Orthopedic Resident was available to assist with this procedure.  Therefore, the assistance of PA named above, was required throughout this entire procedure.    This PA is specifically trained and experienced in assisting with this procedure.  During the procedure, he actively assisted Dr. De Los Santos in completing the operation safely and expeditiously by helping to provide exposure, maintain hemostasis, and served other technical functions, such as suturing, assisting in drilling, etc.  We will be billing for that PA, as a required First Assistant for this procedure.       DESCRIPTION OF PROCEDURE:  The patient was brought to the operative room, and anesthesia was initiated by the anesthesia team. Appropriate preoperative antibiotics were given. The patient was then secured to the [place] table in the standard fashion. The patient was prepped and draped in the usual sterile fashion, a time out was performed, the patient was identified by name and medical record number and the laterality and site of surgery were confirmed by all present.  Standard direct anterior approach to the hip was made. Hemostasis was obtained with Bovie electrocautery. Anterior  and superior capsulectomy was performed in the usual fashion. Femoral neck osteotomy was performed in accordance with the preoperative plan, and femoral head extracted from the acetabulum without difficulty. There was noted to be loss of articular cartilage from the femoral head and acetabulum as well as osteophyte formation on both the femoral and acetabular sides.     Periacetabular retractors were placed, with excellent exposure of the acetabulum being obtained. Remnants of the acetabular labrum were excised. The medial wall of the acetabulum was developed with a reamer 5 mm smaller than the preoperative plan. Reaming then continued circumferentially. The final reamer was line to line with the the definitive implant size. Good quality bone was noted. After irrigation, the acetabular component was inserted with the appropriate degree of abduction and anteversion based upon anatomic landmarks. Appropriate positioning was confirmed with an AP image of the pelvis and AP of the operative hip using C-arm. After terminal impaction, the highly cross-linked polyethylene liner was inserted into the acetabular component and locking mechanism engaged in the usual fashion.    Attention was then directed to the femoral side. The femoral elevating hook was inserted. Further soft tissue release was performed to allow anterior translation of the proximal femur. This included release of the obturator internus tendon. The obturator externus was preserved. After adequate mobilization of the femur, the medullary canal was developed with a curved curette followed by a reverse cutting rasp. The proximal femur is then prepared using sequentially larger broaches up to the definitive implant size. The final broach was both rotationally and longitudinally stable. The hip was reduced and flouro was used to assess the implant. There was excellent fit and fill of the implant and the leg lengths looked appropriate comparing the lesser  tuberosities to the ischium on both sides. Stability was then tested; with 60 degrees of external rotation of the hip and full extension the implant was stable.     Trial implant was removed from the femur. The definitive implant was inserted. The trunnion of the femoral component was then cleaned and dried, followed by impaction of the definitive prosthetic femoral head. The hip was reduced with normal findings again noted.    The wound was irrigated with irrisept solution followed by normal saline. The pericapsular soft tissues were injected with ropivicaine, epinephrine, toradol,  and duramorph. The myofascia was closed using interrupted #1 polysorb, then #2 quill, followed by skin closure using inverted 2-0 poly, 3-0 v-lock in the subcuticular layer, and perineo dressing.  Mepilex AG silver impregnated dressing was then placed. Patient was awoken from anesthesia by the anesthesia team and brought to the pacu in stable condition    Post operative plan: patient may bear weight as tolerated, anterior hip precautions, antibiotics and dvt prophylaxis per protocol, standard post operative supportive care     Statement of staff presence: I was present during all key and critical portions of the procedure and immediately available during closure.        Complications:  None; patient tolerated the procedure well.    Disposition: PACU - hemodynamically stable.  Condition: stable         Additional Details: none    Attending Attestation: I was present and scrubbed for the key portions of the procedure.    Zbigniew De Los Santos  Phone Number: [phone]

## 2025-06-23 NOTE — HH CARE COORDINATION
Home Care received a Referral for Physical Therapy. We have processed the referral for a Start of Care on 06/24/25 .     If you have any questions or concerns, please feel free to contact us at [phone]. Follow the prompts, enter your five digit zip code, and you will be directed to your care team on [facility] 1.

## 2025-06-25 ENCOUNTER — HOME CARE VISIT (OUTPATIENT)
Dept: HOME HEALTH SERVICES | Facility: HOME HEALTH | Age: 70
End: 2025-06-25
Payer: MEDICARE

## 2025-06-25 VITALS
BODY MASS INDEX: 38.09 KG/M2 | HEART RATE: 59 BPM | TEMPERATURE: 97.4 F | WEIGHT: 215 LBS | SYSTOLIC BLOOD PRESSURE: 99 MMHG | RESPIRATION RATE: 16 BRPM | OXYGEN SATURATION: 97 % | HEIGHT: 63 IN | DIASTOLIC BLOOD PRESSURE: 56 MMHG

## 2025-06-25 DIAGNOSIS — K21.9 GASTROESOPHAGEAL REFLUX DISEASE, UNSPECIFIED WHETHER ESOPHAGITIS PRESENT: ICD-10-CM

## 2025-06-25 PROCEDURE — G0151 HHCP-SERV OF PT,EA 15 MIN: HCPCS | Mod: HHH

## 2025-06-25 PROCEDURE — 169592 NO-PAY CLAIM PROCEDURE

## 2025-06-25 SDOH — HEALTH STABILITY: PHYSICAL HEALTH: EXERCISE TYPE: ANTERIOR HIP HEP

## 2025-06-25 ASSESSMENT — ENCOUNTER SYMPTOMS
PAIN SEVERITY GOAL: 2/10
HIGHEST PAIN SEVERITY IN PAST 24 HOURS: 3/10
PERSON REPORTING PAIN: PATIENT
PAIN: 1
PAIN LOCATION - PAIN FREQUENCY: CONSTANT
LOWEST PAIN SEVERITY IN PAST 24 HOURS: 0/10
PAIN LOCATION: RIGHT HIP
PAIN LOCATION - EXACERBATING FACTORS: WALKING
PAIN LOCATION - PAIN QUALITY: ACHY
PAIN LOCATION - PAIN SEVERITY: 1/10
HYPERTENSION: 1
LIMITED RANGE OF MOTION: 1
SUBJECTIVE PAIN PROGRESSION: WAXING AND WANING
MUSCLE WEAKNESS: 1
PAIN LOCATION - RELIEVING FACTORS: ICE AND MEDS

## 2025-06-25 ASSESSMENT — ACTIVITIES OF DAILY LIVING (ADL)
AMBULATION ASSISTANCE: CONTACT GUARD ASSIST
ENTERING_EXITING_HOME: MINIMUM ASSIST
OASIS_M1830: 03
AMBULATION ASSISTANCE ON FLAT SURFACES: 1
DRESSING_LB_CURRENT_FUNCTION: CONTACT GUARD ASSIST
CURRENT_FUNCTION: CONTACT GUARD ASSIST
DRESSING_LB_CURRENT_FUNCTION: STAND BY ASSIST
TOILETING: 1
TOILETING: CONTACT GUARD ASSIST
AMBULATION ASSISTANCE: 1
AMBULATION ASSISTANCE: STAND BY ASSIST
AMBULATION_DISTANCE/DURATION_TOLERATED: 50 FEET X 2
PHYSICAL TRANSFERS ASSESSED: 1
DRESSING_UB_CURRENT_FUNCTION: STAND BY ASSIST
DRESSING_UB_CURRENT_FUNCTION: CONTACT GUARD ASSIST
CURRENT_FUNCTION: STAND BY ASSIST
TOILETING: STAND BY ASSIST

## 2025-06-26 ENCOUNTER — APPOINTMENT (OUTPATIENT)
Dept: PRIMARY CARE | Facility: CLINIC | Age: 70
End: 2025-06-26
Payer: MEDICARE

## 2025-06-26 DIAGNOSIS — I21.4 NON-ST ELEVATION MI (NSTEMI) (MULTI): ICD-10-CM

## 2025-06-26 DIAGNOSIS — E11.9 TYPE 2 DIABETES MELLITUS WITHOUT COMPLICATION, WITHOUT LONG-TERM CURRENT USE OF INSULIN: ICD-10-CM

## 2025-06-26 DIAGNOSIS — E78.2 MIXED HYPERLIPIDEMIA: ICD-10-CM

## 2025-06-26 RX ORDER — OMEPRAZOLE 40 MG/1
40 CAPSULE, DELAYED RELEASE ORAL DAILY
Qty: 90 CAPSULE | Refills: 0 | Status: SHIPPED | OUTPATIENT
Start: 2025-06-26

## 2025-06-26 RX ORDER — TIRZEPATIDE 10 MG/.5ML
10 INJECTION, SOLUTION SUBCUTANEOUS
Qty: 6 ML | Refills: 1 | Status: SHIPPED | OUTPATIENT
Start: 2025-06-26

## 2025-06-26 RX ORDER — EVOLOCUMAB 140 MG/ML
140 INJECTION, SOLUTION SUBCUTANEOUS
Qty: 6 ML | Refills: 2 | Status: SHIPPED | OUTPATIENT
Start: 2025-06-26 | End: 2026-06-21

## 2025-06-26 NOTE — PROGRESS NOTES
"[facility] Report review   E11.9 - Type 2 diabetes    Katya Kaplan \"Mariam\" is a 70 y.o. female who was referred by Trupti Greene MD to review current CGM data.      Patient is using CGM, Vernell.  Report reviewed with patient.  See attached documents.  Pt is recovering from hip replacement surgery.  States she is doing well.      Taking Lantus 10-12 units daily (self increased from 6 units daily) Taking Mounjaro 10mg weekly but has not had Mounjaro dose in approx 2 weeks due to surgery.      Current DM Meds:  Current Outpatient Medications   Medication Instructions    albuterol 2.5 mg /3 mL (0.083 %) nebulizer solution Use 1 vial (2.5 mg) by nebulization every 6 hours if needed for wheezing.    albuterol 90 mcg/actuation inhaler INHALE 2 PUFFS BY MOUTH FOUR TIMES A DAY AS NEEDED FOR WHEEZING    [START ON 6/29/2025] apixaban (Eliquis) 5 mg tablet TAKE 1 TABLET BY MOUTH TWO TIMES A DAY    apixaban (ELIQUIS) 2.5 mg, oral, 2 times daily    blood-glucose sensor (FreeStyle Vernell 3 Sensor) device Change sensor every 15 days as directed - Please dispense Vernell 3 plus sensors    cefadroxil (DURICEF) 500 mg, oral, 2 times daily    cyanocobalamin (Vitamin B-12) 1,000 mcg tablet 1 tablet, Daily    docusate sodium (COLACE) 100 mg, oral, 2 times daily    DULoxetine (CYMBALTA) 30 mg, oral, Daily, Do not crush or chew.    ezetimibe (ZETIA) 10 mg, oral, Daily    fluticasone furoate-vilanteroL (Breo Ellipta) 200-25 mcg/dose inhaler INHALE 1 PUFF BY MOUTH ONE TIME DAILY    folic acid (Folvite) 1 mg tablet Daily    gabapentin (NEURONTIN) 600 mg, oral, 3 times daily    Lantus Solostar U-100 Insulin 10 Units, subcutaneous, Nightly, Discard pen 28 days after use.    metoprolol succinate XL (TOPROL-XL) 50 mg, oral, Daily    [START ON 7/3/2025] Mounjaro 10 mg, subcutaneous, Every 7 days    omeprazole (PRILOSEC) 40 mg, oral, Daily    oxyCODONE-acetaminophen (Percocet) 5-325 mg tablet 1 tablet, oral, Every 4 hours PRN    pen needle, diabetic 32 gauge " "x 5/32\" needle Use daily with insulin injection.    polyethylene glycol (Glycolax, Miralax) 17 gram/dose powder Take 17 g by mouth once daily for 10 days.    ranolazine (Ranexa) 500 mg 12 hr tablet TAKE 1 TABLET BY MOUTH TWO TIMES A DAY    Repatha SureClick 140 mg, subcutaneous, Every 14 days       HEMOGLOBIN A1c (% of total Hgb)   Date Value   02/17/2025 5.3     Hemoglobin A1C (%)   Date Value   04/01/2025 4.7   11/01/2024 5.2   08/13/2024 6.7 (H)     GLUCOSE (mg/dL)   Date Value   02/17/2025 110 (H)     Glucose (mg/dL)   Date Value   06/04/2025 122 (H)     Creatinine (mg/dL)   Date Value   06/04/2025 0.74     CREATININE (mg/dL)   Date Value   02/17/2025 0.77     EGFR (mL/min/1.73m2)   Date Value   02/17/2025 83     eGFR (mL/min/1.73m*2)   Date Value   06/04/2025 87       Summary of CGM Findings:  Name: Katya Kaplan  YOB: 1955  Report Period: 06/13/2025 - 06/26/2025 (14 days)  Generated: 06/26/2025  Time CGM Active: 96%    Glucose Statistics and Targets  Average Glucose: 111 mg/dL  Glucose Management Indicator (GMI): 6.0%  Glucose Variability (%CV): 21.9%  Target Range: 70 - 180 mg/dL    Time in Ranges  Very High: >250 mg/dL --- 0%  High: 181 - 250 mg/dL --- 3%  Target Range: 70 - 180 mg/dL --- 97%  Low: 54 - 69 mg/dL --- 0%  Very Low: <54 mg/dL --- 0%    PLAN:   Discussed restart Mounjaro 10mg once weekly.  Continue stool softener to prevent constipation.  May also use Miralax if needed.  Pt is agreeable.    Instructed pt to decrease Lantus back to 6 units daily once Mounjaro is restarted.  Pt is agreeable.    Schedule OV with clinical pharmacist in 3 months.      Data reviewed and evaluated by DONOVAN Raza RP, CDCES  Recommendations/report sent to Trupti Greene MD via shared medical record  "

## 2025-06-27 ENCOUNTER — HOME CARE VISIT (OUTPATIENT)
Dept: HOME HEALTH SERVICES | Facility: HOME HEALTH | Age: 70
End: 2025-06-27
Payer: MEDICARE

## 2025-06-27 VITALS
RESPIRATION RATE: 16 BRPM | SYSTOLIC BLOOD PRESSURE: 105 MMHG | OXYGEN SATURATION: 96 % | DIASTOLIC BLOOD PRESSURE: 63 MMHG | TEMPERATURE: 97.1 F | HEART RATE: 77 BPM

## 2025-06-27 PROCEDURE — RXMED WILLOW AMBULATORY MEDICATION CHARGE

## 2025-06-27 PROCEDURE — G0151 HHCP-SERV OF PT,EA 15 MIN: HCPCS | Mod: HHH

## 2025-06-27 ASSESSMENT — ENCOUNTER SYMPTOMS
PAIN LOCATION - PAIN SEVERITY: 1/10
HIGHEST PAIN SEVERITY IN PAST 24 HOURS: 8/10
PAIN LOCATION: RIGHT HIP
PAIN SEVERITY GOAL: 3/10
PERSON REPORTING PAIN: PATIENT
PAIN LOCATION - EXACERBATING FACTORS: TRANSFERS
PAIN: 1
PAIN LOCATION - PAIN FREQUENCY: INTERMITTENT
LOWEST PAIN SEVERITY IN PAST 24 HOURS: 4/10
SUBJECTIVE PAIN PROGRESSION: WAXING AND WANING

## 2025-06-28 ENCOUNTER — PHARMACY VISIT (OUTPATIENT)
Dept: PHARMACY | Facility: CLINIC | Age: 70
End: 2025-06-28
Payer: MEDICARE

## 2025-06-30 ENCOUNTER — PHARMACY VISIT (OUTPATIENT)
Dept: PHARMACY | Facility: CLINIC | Age: 70
End: 2025-06-30
Payer: MEDICARE

## 2025-07-01 ENCOUNTER — HOME CARE VISIT (OUTPATIENT)
Dept: HOME HEALTH SERVICES | Facility: HOME HEALTH | Age: 70
End: 2025-07-01
Payer: MEDICARE

## 2025-07-01 VITALS
SYSTOLIC BLOOD PRESSURE: 122 MMHG | DIASTOLIC BLOOD PRESSURE: 68 MMHG | RESPIRATION RATE: 18 BRPM | TEMPERATURE: 96.9 F | HEART RATE: 87 BPM | OXYGEN SATURATION: 96 %

## 2025-07-01 DIAGNOSIS — Z96.652 STATUS POST TOTAL LEFT KNEE REPLACEMENT: ICD-10-CM

## 2025-07-01 PROCEDURE — RXMED WILLOW AMBULATORY MEDICATION CHARGE

## 2025-07-01 PROCEDURE — G0157 HHC PT ASSISTANT EA 15: HCPCS | Mod: CQ,HHH

## 2025-07-01 RX ORDER — OXYCODONE AND ACETAMINOPHEN 5; 325 MG/1; MG/1
1 TABLET ORAL EVERY 6 HOURS PRN
Qty: 28 TABLET | Refills: 0 | Status: SHIPPED | OUTPATIENT
Start: 2025-07-01 | End: 2025-07-10

## 2025-07-01 ASSESSMENT — ENCOUNTER SYMPTOMS
PAIN LOCATION - PAIN SEVERITY: 2/10
PAIN LOCATION: RIGHT HIP
PAIN LOCATION - PAIN DURATION: INTERMITTENT
PAIN LOCATION - EXACERBATING FACTORS: ACTIVITY
PAIN: 1
PAIN LOCATION - PAIN QUALITY: SHARP
PAIN LOCATION - RELIEVING FACTORS: REST, ICE, MEDICATION
PERSON REPORTING PAIN: PATIENT
PAIN LOCATION - PAIN FREQUENCY: INTERMITTENT

## 2025-07-03 ENCOUNTER — HOME CARE VISIT (OUTPATIENT)
Dept: HOME HEALTH SERVICES | Facility: HOME HEALTH | Age: 70
End: 2025-07-03
Payer: MEDICARE

## 2025-07-03 ENCOUNTER — PHARMACY VISIT (OUTPATIENT)
Dept: PHARMACY | Facility: CLINIC | Age: 70
End: 2025-07-03
Payer: MEDICARE

## 2025-07-03 VITALS
SYSTOLIC BLOOD PRESSURE: 130 MMHG | TEMPERATURE: 97.2 F | RESPIRATION RATE: 18 BRPM | DIASTOLIC BLOOD PRESSURE: 72 MMHG | HEART RATE: 78 BPM | OXYGEN SATURATION: 97 %

## 2025-07-03 PROCEDURE — G0157 HHC PT ASSISTANT EA 15: HCPCS | Mod: CQ,HHH

## 2025-07-03 ASSESSMENT — ENCOUNTER SYMPTOMS
PAIN LOCATION - EXACERBATING FACTORS: ACTIVITY
PAIN LOCATION - PAIN QUALITY: SHARP
PAIN LOCATION - PAIN FREQUENCY: INTERMITTENT
PAIN LOCATION - PAIN DURATION: INTERMITTENT
PAIN LOCATION - PAIN SEVERITY: 6/10
PAIN SEVERITY GOAL: 0/10
SUBJECTIVE PAIN PROGRESSION: WAXING AND WANING
PERSON REPORTING PAIN: PATIENT
LOWEST PAIN SEVERITY IN PAST 24 HOURS: 4/10
PAIN LOCATION: BACK
PAIN LOCATION - RELIEVING FACTORS: REST, MEDICATION
HIGHEST PAIN SEVERITY IN PAST 24 HOURS: 9/10
PAIN: 1

## 2025-07-07 ENCOUNTER — APPOINTMENT (OUTPATIENT)
Dept: ORTHOPEDIC SURGERY | Facility: CLINIC | Age: 70
End: 2025-07-07
Payer: MEDICARE

## 2025-07-07 ENCOUNTER — HOSPITAL ENCOUNTER (OUTPATIENT)
Dept: RADIOLOGY | Facility: HOSPITAL | Age: 70
Discharge: HOME | End: 2025-07-07
Payer: MEDICARE

## 2025-07-07 DIAGNOSIS — Z96.641 STATUS POST TOTAL HIP REPLACEMENT, RIGHT: ICD-10-CM

## 2025-07-07 PROCEDURE — 1160F RVW MEDS BY RX/DR IN RCRD: CPT

## 2025-07-07 PROCEDURE — 1125F AMNT PAIN NOTED PAIN PRSNT: CPT

## 2025-07-07 PROCEDURE — 73502 X-RAY EXAM HIP UNI 2-3 VIEWS: CPT | Mod: RT

## 2025-07-07 PROCEDURE — 73502 X-RAY EXAM HIP UNI 2-3 VIEWS: CPT | Mod: RIGHT SIDE | Performed by: RADIOLOGY

## 2025-07-07 PROCEDURE — 1036F TOBACCO NON-USER: CPT

## 2025-07-07 PROCEDURE — 99024 POSTOP FOLLOW-UP VISIT: CPT

## 2025-07-07 PROCEDURE — 3044F HG A1C LEVEL LT 7.0%: CPT

## 2025-07-07 PROCEDURE — 3048F LDL-C <100 MG/DL: CPT

## 2025-07-07 PROCEDURE — 1159F MED LIST DOCD IN RCRD: CPT

## 2025-07-07 ASSESSMENT — PAIN - FUNCTIONAL ASSESSMENT: PAIN_FUNCTIONAL_ASSESSMENT: 0-10

## 2025-07-07 ASSESSMENT — PAIN SCALES - GENERAL: PAINLEVEL_OUTOF10: 1

## 2025-07-08 ENCOUNTER — HOME CARE VISIT (OUTPATIENT)
Dept: HOME HEALTH SERVICES | Facility: HOME HEALTH | Age: 70
End: 2025-07-08
Payer: MEDICARE

## 2025-07-08 ENCOUNTER — TELEPHONE (OUTPATIENT)
Dept: HEMATOLOGY/ONCOLOGY | Facility: CLINIC | Age: 70
End: 2025-07-08
Payer: MEDICARE

## 2025-07-08 VITALS
HEART RATE: 78 BPM | DIASTOLIC BLOOD PRESSURE: 68 MMHG | OXYGEN SATURATION: 96 % | TEMPERATURE: 96.8 F | RESPIRATION RATE: 18 BRPM | SYSTOLIC BLOOD PRESSURE: 124 MMHG

## 2025-07-08 PROCEDURE — G0157 HHC PT ASSISTANT EA 15: HCPCS | Mod: CQ,HHH

## 2025-07-08 NOTE — TELEPHONE ENCOUNTER
Reason for Conversation  Cancel appointment    Background   Received [employer] message from patient to cancel Aug 5th appointment.  Called her to ask about re-scheduling.  Currently in the midst of hip replacement surgery recovery, with other hip needing to be replaced as well.  Would like to know if she truly needs to re-schedule with SHA Gupta going forward.  Please advise    Disposition   No disposition on file.

## 2025-07-09 DIAGNOSIS — I20.89 ANGINA DECUBITUS: ICD-10-CM

## 2025-07-09 DIAGNOSIS — M48.062 LUMBAR STENOSIS WITH NEUROGENIC CLAUDICATION: ICD-10-CM

## 2025-07-09 RX ORDER — GABAPENTIN 300 MG/1
600 CAPSULE ORAL 3 TIMES DAILY
Qty: 180 CAPSULE | Refills: 3 | Status: CANCELLED | OUTPATIENT
Start: 2025-07-09 | End: 2025-11-06

## 2025-07-09 RX ORDER — RANOLAZINE 500 MG/1
500 TABLET, EXTENDED RELEASE ORAL 2 TIMES DAILY
Qty: 180 TABLET | Refills: 3 | Status: SHIPPED | OUTPATIENT
Start: 2025-07-09 | End: 2026-07-04

## 2025-07-09 NOTE — TELEPHONE ENCOUNTER
He has no criteria that would allow for a successful appeal   I will schedule him for a home study instead  NOV: 12/30/2025    Requested Prescriptions     Pending Prescriptions Disp Refills    ranolazine (Ranexa) 500 mg 12 hr tablet 180 tablet 3     Sig: TAKE 1 TABLET BY MOUTH TWO TIMES A DAY

## 2025-07-11 ENCOUNTER — HOME CARE VISIT (OUTPATIENT)
Dept: HOME HEALTH SERVICES | Facility: HOME HEALTH | Age: 70
End: 2025-07-11
Payer: MEDICARE

## 2025-07-11 VITALS
TEMPERATURE: 97.5 F | RESPIRATION RATE: 18 BRPM | HEART RATE: 73 BPM | OXYGEN SATURATION: 97 % | DIASTOLIC BLOOD PRESSURE: 72 MMHG | SYSTOLIC BLOOD PRESSURE: 130 MMHG

## 2025-07-11 PROCEDURE — RXMED WILLOW AMBULATORY MEDICATION CHARGE

## 2025-07-11 PROCEDURE — G0151 HHCP-SERV OF PT,EA 15 MIN: HCPCS | Mod: HHH

## 2025-07-11 ASSESSMENT — ACTIVITIES OF DAILY LIVING (ADL)
OASIS_M1830: 01
HOME_HEALTH_OASIS: 00
CURRENT_FUNCTION: INDEPENDENT
AMBULATION ASSISTANCE ON FLAT SURFACES: 1
AMBULATION ASSISTANCE: INDEPENDENT
PHYSICAL TRANSFERS ASSESSED: 1
AMBULATION_DISTANCE/DURATION_TOLERATED: 150 FT
AMBULATION ASSISTANCE: 1

## 2025-07-11 ASSESSMENT — ENCOUNTER SYMPTOMS: DENIES PAIN: 1

## 2025-07-14 ENCOUNTER — PHARMACY VISIT (OUTPATIENT)
Dept: PHARMACY | Facility: CLINIC | Age: 70
End: 2025-07-14
Payer: MEDICARE

## 2025-07-14 DIAGNOSIS — E11.9 TYPE 2 DIABETES MELLITUS WITHOUT COMPLICATION, WITHOUT LONG-TERM CURRENT USE OF INSULIN: Primary | ICD-10-CM

## 2025-07-14 LAB
LABORATORY COMMENT REPORT: NORMAL
PATH REPORT.FINAL DX SPEC: NORMAL
PATH REPORT.GROSS SPEC: NORMAL
PATH REPORT.RELEVANT HX SPEC: NORMAL
PATH REPORT.TOTAL CANCER: NORMAL

## 2025-07-15 PROCEDURE — RXMED WILLOW AMBULATORY MEDICATION CHARGE

## 2025-07-17 ENCOUNTER — PHARMACY VISIT (OUTPATIENT)
Dept: PHARMACY | Facility: CLINIC | Age: 70
End: 2025-07-17
Payer: MEDICARE

## 2025-07-21 DIAGNOSIS — M48.062 LUMBAR STENOSIS WITH NEUROGENIC CLAUDICATION: ICD-10-CM

## 2025-07-21 RX ORDER — GABAPENTIN 300 MG/1
600 CAPSULE ORAL 3 TIMES DAILY
Qty: 180 CAPSULE | Refills: 3 | Status: CANCELLED | OUTPATIENT
Start: 2025-07-21 | End: 2025-11-18

## 2025-07-22 ENCOUNTER — APPOINTMENT (OUTPATIENT)
Dept: PHARMACY | Facility: HOSPITAL | Age: 70
End: 2025-07-22
Payer: MEDICARE

## 2025-07-22 DIAGNOSIS — J44.9 COPD MIXED TYPE (MULTI): ICD-10-CM

## 2025-07-22 DIAGNOSIS — E11.9 TYPE 2 DIABETES MELLITUS WITHOUT COMPLICATION, WITHOUT LONG-TERM CURRENT USE OF INSULIN: ICD-10-CM

## 2025-07-22 DIAGNOSIS — I21.4 NON-ST ELEVATION MI (NSTEMI) (MULTI): ICD-10-CM

## 2025-07-22 DIAGNOSIS — E78.2 MIXED HYPERLIPIDEMIA: ICD-10-CM

## 2025-07-22 PROCEDURE — RXMED WILLOW AMBULATORY MEDICATION CHARGE

## 2025-07-22 RX ORDER — EVOLOCUMAB 140 MG/ML
140 INJECTION, SOLUTION SUBCUTANEOUS
Qty: 6 ML | Refills: 2 | Status: SHIPPED | OUTPATIENT
Start: 2025-07-22 | End: 2026-03-31

## 2025-07-22 RX ORDER — FLUTICASONE FUROATE AND VILANTEROL 200; 25 UG/1; UG/1
1 POWDER RESPIRATORY (INHALATION) DAILY
Qty: 180 EACH | Refills: 1 | Status: SHIPPED | OUTPATIENT
Start: 2025-07-22 | End: 2026-07-22

## 2025-07-22 RX ORDER — INSULIN GLARGINE 100 [IU]/ML
10 INJECTION, SOLUTION SUBCUTANEOUS NIGHTLY
Qty: 15 ML | Refills: 1 | Status: SHIPPED | OUTPATIENT
Start: 2025-07-22 | End: 2026-05-18

## 2025-07-22 RX ORDER — TIRZEPATIDE 10 MG/.5ML
10 INJECTION, SOLUTION SUBCUTANEOUS
Qty: 6 ML | Refills: 1 | Status: SHIPPED | OUTPATIENT
Start: 2025-07-22

## 2025-07-22 RX ORDER — ALBUTEROL SULFATE 90 UG/1
INHALANT RESPIRATORY (INHALATION)
Qty: 20.1 G | Refills: 1 | Status: SHIPPED | OUTPATIENT
Start: 2025-07-22 | End: 2026-07-22

## 2025-07-22 NOTE — PROGRESS NOTES
Please review for internal [employer] (VAF) eligibility. Prescriptions have been sent to [facility].     Katya Kaplan  1955   [id number]  [phone]   [email or web address]  Delivery Preference (if known): MAIL  Priority:  Hold Medication until [facility] approved/denied  Filing Status: Patient does file taxes  Household size: 1  Financial Documents To Be Collected By: Documents attached to email  Medication(s):    Albuterol, Eliquis, Repatha, Breo Ellipta, Lantus, Mounjaro     *I have confirmed the above medications are listed on the current VAF formulary: [email or web address]    I acknowledge the [facility] staff will further reach out to the patient to confirm additional details as needed, including but not limited to collecting financial documentation, confirming delivery information, obtaining payment method for non-VAF medications.      Mamie Raza [facility]

## 2025-07-22 NOTE — PROGRESS NOTES
"MEDICATION MANAGEMENT    Katya Kaplan \"Mariam\" is a 70 y.o. female who was referred by Trupti Greene MD to complete medication reconciliation and review with the clinical pharmacist.  A comprehensive medication review was completed with the patient via telephone today.  With patient's permission, this is a Telemedicine visit with audio. Provider located at office address. Patient located at their home address. All issues as documented below were discussed and addressed but limited physical exam was performed. If it was felt that the patient should be evaluated via face-to-face office appointment(s) they were directed to appropriate location.  Patient reports financial barrier with current medication.      MEDICATION HISTORY  Allergies[1]  Medications Ordered Prior to Encounter[2]     Patient Assistance Screening (VAF)  Patient verbally reports monthly or yearly income which is less than 400% federal poverty level.  Application for program has been submitted for the following medications:      Albuterol, Eliquis, Breo Ellipta, Mounjaro, Repatha, Eliquis    Patient has been informed that program team will be reaching out to them to discuss necessary documentation, instructed to answer phone/return voicemail.   Patient aware this process may take up to 6 weeks.   If approved medication must be filled through [facility] pharmacy and may be picked up or mailed to patient.       LABS  There were no vitals taken for this visit.   Lab Results   Component Value Date    HGBA1C 4.7 04/01/2025    HGBA1C 5.3 02/17/2025    HGBA1C 5.2 11/01/2024     Lab Results   Component Value Date    BILITOT 0.7 06/04/2025    CALCIUM 8.8 06/04/2025    CO2 20 (L) 06/04/2025     06/04/2025    CREATININE 0.74 06/04/2025    GLUCOSE 122 (H) 06/04/2025    ALKPHOS 55 06/04/2025    K 4.1 06/04/2025    PROT 6.1 (L) 06/04/2025     06/04/2025    AST 10 06/04/2025    ALT 9 06/04/2025    BUN 20 06/04/2025    ANIONGAP 19 06/04/2025    MG 1.97 " 04/06/2022     02/03/2025    ALBUMIN 3.9 06/04/2025    LIPASE 28 06/11/2023    GFRF 87 04/06/2022     Lab Results   Component Value Date    TRIG 132 04/01/2025    CHOL 92 04/01/2025    LDLCALC 37 04/01/2025    HDL 28.9 04/01/2025         Assessment/Plan    Comments/Recommendations to PCP:  Reconciled medications with patient via telephone today.    Reviewed instructions, MOA, SE and dose for Albuterol, Eliquis, Breo Ellipta, Mounjaro, Repatha, Eliquis   Patient verbalizes understanding regarding plan of care and all questions answered   4.   Pt will follow up with PCP as scheduled      Mamie Raza [facility]    Verbal consent to manage patient's drug therapy was obtained from the patient and/or an individual authorized to act on behalf of a patient. They were informed they may decline to participate or withdraw from participation in pharmacy services at any time.       [1]   Allergies  Allergen Reactions    Penicillins Swelling   [2]   Current Outpatient Medications on File Prior to Visit   Medication Sig Dispense Refill    albuterol 2.5 mg /3 mL (0.083 %) nebulizer solution Use 1 vial (2.5 mg) by nebulization every 6 hours if needed for wheezing. 75 mL 0    albuterol 90 mcg/actuation inhaler INHALE 2 PUFFS BY MOUTH FOUR TIMES A DAY AS NEEDED FOR WHEEZING 20.1 g 1    apixaban (Eliquis) 5 mg tablet TAKE 1 TABLET BY MOUTH TWO TIMES A  tablet 1    blood-glucose sensor (FreeStyle Vernell 3 Sensor) device Change sensor every 15 days as directed 6 each 3    cyanocobalamin (Vitamin B-12) 1,000 mcg tablet Take 1 tablet by mouth once daily.      DULoxetine (Cymbalta) 30 mg DR capsule Take 1 capsule (30 mg) by mouth once daily. Do not crush or chew. (Patient taking differently: Take 1 capsule (30 mg) by mouth once daily at bedtime. Do not crush or chew.) 30 capsule 2    evolocumab (Repatha SureClick) 140 mg/mL injection Inject 1 mL (140 mg) under the skin every 14 (fourteen) days. 6 mL 2    ezetimibe (Zetia) 10 mg  "tablet Take 1 tablet (10 mg) by mouth once daily. 90 tablet 3    fluticasone furoate-vilanteroL (Breo Ellipta) 200-25 mcg/dose inhaler INHALE 1 PUFF BY MOUTH ONE TIME DAILY 180 each 1    folic acid (Folvite) 1 mg tablet Take 1 capsule by mouth once daily.      gabapentin (Neurontin) 300 mg capsule Take 2 capsules (600 mg) by mouth 3 times a day. 180 capsule 3    insulin glargine (Lantus Solostar U-100 Insulin) 100 unit/mL (3 mL) pen Inject 10 Units under the skin once daily at bedtime. Discard pen 28 days after use. 15 mL 1    metoprolol succinate XL (Toprol-XL) 50 mg 24 hr tablet Take 1 tablet (50 mg) by mouth once daily. 90 tablet 3    omeprazole (PriLOSEC) 40 mg DR capsule Take 1 capsule (40 mg) by mouth once daily. 90 capsule 0    pen needle, diabetic 32 gauge x 5/32\" needle Use daily with insulin injection. 100 each 2    polyethylene glycol (Glycolax, Miralax) 17 gram/dose powder Take 17 g by mouth once daily for 10 days. 238 g 0    ranolazine (Ranexa) 500 mg 12 hr tablet TAKE 1 TABLET BY MOUTH TWO TIMES A  tablet 3    tirzepatide (Mounjaro) 10 mg/0.5 mL pen injector Inject 10 mg under the skin every 7 days. Inject 10 mg under the skin every 7 days 6 mL 1     No current facility-administered medications on file prior to visit.     "

## 2025-07-23 ENCOUNTER — PHARMACY VISIT (OUTPATIENT)
Dept: PHARMACY | Facility: CLINIC | Age: 70
End: 2025-07-23
Payer: MEDICARE

## 2025-07-24 DIAGNOSIS — M48.062 LUMBAR STENOSIS WITH NEUROGENIC CLAUDICATION: ICD-10-CM

## 2025-07-25 PROCEDURE — RXMED WILLOW AMBULATORY MEDICATION CHARGE

## 2025-07-25 RX ORDER — DULOXETIN HYDROCHLORIDE 30 MG/1
30 CAPSULE, DELAYED RELEASE ORAL DAILY
Qty: 30 CAPSULE | Refills: 2 | Status: SHIPPED | OUTPATIENT
Start: 2025-07-25

## 2025-07-28 ENCOUNTER — PHARMACY VISIT (OUTPATIENT)
Dept: PHARMACY | Facility: CLINIC | Age: 70
End: 2025-07-28
Payer: MEDICARE

## 2025-07-31 DIAGNOSIS — M48.062 LUMBAR STENOSIS WITH NEUROGENIC CLAUDICATION: ICD-10-CM

## 2025-07-31 PROCEDURE — RXMED WILLOW AMBULATORY MEDICATION CHARGE

## 2025-07-31 RX ORDER — GABAPENTIN 300 MG/1
600 CAPSULE ORAL 3 TIMES DAILY
Qty: 180 CAPSULE | Refills: 3 | Status: SHIPPED | OUTPATIENT
Start: 2025-07-31 | End: 2025-11-28

## 2025-08-01 ENCOUNTER — PHARMACY VISIT (OUTPATIENT)
Dept: PHARMACY | Facility: CLINIC | Age: 70
End: 2025-08-01
Payer: MEDICARE

## 2025-08-04 ENCOUNTER — APPOINTMENT (OUTPATIENT)
Dept: NEPHROLOGY | Facility: CLINIC | Age: 70
End: 2025-08-04
Payer: MEDICARE

## 2025-08-05 ENCOUNTER — APPOINTMENT (OUTPATIENT)
Dept: HEMATOLOGY/ONCOLOGY | Facility: CLINIC | Age: 70
End: 2025-08-05
Payer: MEDICARE

## 2025-08-05 ENCOUNTER — APPOINTMENT (OUTPATIENT)
Dept: ORTHOPEDIC SURGERY | Facility: CLINIC | Age: 70
End: 2025-08-05
Payer: MEDICARE

## 2025-08-05 ENCOUNTER — HOSPITAL ENCOUNTER (OUTPATIENT)
Dept: RADIOLOGY | Facility: HOSPITAL | Age: 70
Discharge: HOME | End: 2025-08-05
Payer: MEDICARE

## 2025-08-05 DIAGNOSIS — M25.552 BILATERAL HIP PAIN: ICD-10-CM

## 2025-08-05 DIAGNOSIS — M25.552 BILATERAL HIP PAIN: Primary | ICD-10-CM

## 2025-08-05 DIAGNOSIS — M25.551 BILATERAL HIP PAIN: ICD-10-CM

## 2025-08-05 DIAGNOSIS — Z01.818 PRE-OP EVALUATION: ICD-10-CM

## 2025-08-05 DIAGNOSIS — M16.12 ARTHRITIS OF LEFT HIP: ICD-10-CM

## 2025-08-05 DIAGNOSIS — M25.551 BILATERAL HIP PAIN: Primary | ICD-10-CM

## 2025-08-05 PROCEDURE — 73523 X-RAY EXAM HIPS BI 5/> VIEWS: CPT | Mod: BILATERAL PROCEDURE | Performed by: RADIOLOGY

## 2025-08-05 PROCEDURE — 73523 X-RAY EXAM HIPS BI 5/> VIEWS: CPT

## 2025-08-05 PROCEDURE — 99215 OFFICE O/P EST HI 40 MIN: CPT | Performed by: ORTHOPAEDIC SURGERY

## 2025-08-05 PROCEDURE — 1160F RVW MEDS BY RX/DR IN RCRD: CPT | Performed by: ORTHOPAEDIC SURGERY

## 2025-08-05 PROCEDURE — 1159F MED LIST DOCD IN RCRD: CPT | Performed by: ORTHOPAEDIC SURGERY

## 2025-08-05 PROCEDURE — G2211 COMPLEX E/M VISIT ADD ON: HCPCS | Performed by: ORTHOPAEDIC SURGERY

## 2025-08-05 RX ORDER — ACETAMINOPHEN 325 MG/1
975 TABLET ORAL ONCE
OUTPATIENT
Start: 2025-08-05 | End: 2025-08-05

## 2025-08-05 RX ORDER — CELECOXIB 400 MG/1
400 CAPSULE ORAL ONCE
OUTPATIENT
Start: 2025-08-05 | End: 2025-08-05

## 2025-08-05 RX ORDER — CEFAZOLIN SODIUM 2 G/100ML
2 INJECTION, SOLUTION INTRAVENOUS ONCE
OUTPATIENT
Start: 2025-08-05 | End: 2025-08-05

## 2025-08-05 ASSESSMENT — PAIN - FUNCTIONAL ASSESSMENT: PAIN_FUNCTIONAL_ASSESSMENT: NO/DENIES PAIN

## 2025-08-05 NOTE — PROGRESS NOTES
This is a consultation from Dr. Trupti Greene MD for   Chief Complaint   Patient presents with    Right Hip - Pain     06/23/25 RT MICHELLE     Left Hip - Pain       This is a 70 y.o. female who presents for follow-up for her bilateral hips.  For her right hip she is 6 weeks out from right total hip, she is doing great.  She has excellent relief of her preoperative symptoms of pain.  No drainage from incision no fevers no chills.  Regarding her left hip, this is unfortunately getting worse that she has been walking more.  She complains of a severe deep sharp stabbing pain in the anterior hip and groin radiating to the anterior thigh.  This has been severely exacerbated for last few weeks.  She has had extensive trial of nonsurgical treatments including use of anti-inflammatories physical therapy activity modification use of assistive devices.  Despite that she is severe and worsening pain which impacts her quality of life and activities of daily living    Physical Exam    There has been no interval change in this patient's past medical, surgical, medications, allergies, family history or social history since the most recent visit to a provider within our department. 14 point review of systems was performed, reviewed, and negative except for pertinent positives documented in the history of present illness.     Constitutional: well developed, well nourished female in no acute distress  Psychiatric: normal mood, appropriate affect  Eyes: sclera anicteric  HENT: normocephalic/atraumatic  CV: regular rate and rhythm   Respiratory: non labored breathing  Integumentary: no rash  Neurological: moves all extremities    Left hip exam: skin normal, no abrasions, wounds, or lacerations. nttp over greater trochanter. negative log roll, negative reji's test. flexion to 90 degrees without pain.  Hip pain with flexion abduction and external rotation, severe hip and groin pain with flexion adduction and internal rotation.  neurovascularly intact distally    Right hip examination: Well-healed surgical incision erythema no drainage no pain range of motion neurovascular intact distally    Imaging:  Xrays were ordered by me, they were reviewed and independently interpreted by me today, they show stable right total hip arthroplasty evidence of any fracture loosening dislocation.  Severe degenerative disease left hip bone-on-bone arthritis subchondral sclerosis osteophyte formation    Procedures      Impression/Plan: This is a 70 y.o. female status post right total hip doing great.  Weightbearing as tolerated activity as tolerated.  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  Patient elected to proceed with left total hip.    I had an extensive discussion with the patient regarding her condition and possible treatment options. Nonsurgical treatment for left hip arthritis includes activity modification, weight loss, use of a cane or other assistive device, pain medications and nonsteroidal anti-inflammatory medications, joint injections, and physical therapy. The patient has attempted non-surgical treatment for this condition during the past year for greater than 6 months and it has failed. We discussed the risks benefits and alternatives of total hip arthroplasty. Benefits of joint replacement include: Relief of pain, improvement of function, and improved quality of life. Alternatives include observation and watchful waiting, and the nonsurgical modalities noted above.     1 issue of concern for this patient is  their history of diabetes.  The patient's most recent A1c was less than 7.5 which indicates is under sufficient control to proceed with total joint surgery.  Diabetes raises the risk for complications after total joint surgeries especially risk of infection.  The patient is aware of this and would still like to proceed.    Patient is at high risk for venous  thromboemboli.  This is because of a history of previous venous thromboemboli and/or a genetic condition that predisposes them to venous thromboemboli.  Discussed the risks with the patient.  We will plan for risk stratified VTE prophylaxis postoperatively.    1 specific issue for this patient is that the patient is on therapeutic anticoagulation.  The patient will need to come off that anticoagulation for surgery.  We discussed the risks and coming off anticoagulation as the patient may be susceptible to the condition for which they are treated with anticoagulation normally.  Additionally they will have to go back on therapeutic anticoagulation soon after surgery and this places them at increased risk for bleeding complications and infection.  We will do this in coordination with the doctor who is managing the patient's anticoagulation.  The patient is fully aware of the risks and would still like to proceed.        We discussed the risks of complications as well as the risks of morbidity and mortality related to surgical treatment with total joint replacement. We reviewed the fact that total joint replacement is major elective surgery with significant associated surgical and procedural risk factors as detailed below.   Risks include: Pain, blood loss, damage to nearby anatomical structures including but not limited to nerves or blood vessels muscles tendons and bone, failure surgery to ameliorate symptoms, persistence of pain surrounding the affected joint, mechanical failure of the prosthesis including but not limited to loosening of the prosthesis from bone ,breakage of the prosthesis and dislocation of the prosthesis, change in length or appearance of a limb, infection possibly necessitating further surgery, removal of the prosthesis, or limb amputation, the need for additional surgery for other reasons, blood clots, amputation, and death. No guarantees were implied or given.  All questions were answered and  the patient voiced their understanding.     A complete set of surgical instructions was given to the patient. The patient was educated regarding preoperative nutrition, preparation of their home for postoperative rehabilitation, choosing a care partner, medical and dental clearance, the cessation of medications that can cause bleeding or presenting to risk for infection, chlorhexidine bath, nasal swab and decontamination, post operative follow up, and need for medical equipment. Patient was also educated regarding the possibility of same day surgery and related criteria and protocols. The patient will attend our joint class further education, and thereafter they will return for a preoperative visit. A presurgery education booklet was also given to the patient.     surgical plan: Left total hip  implants: [place]  approach: Direct anterior  DVT ppx Eliquis  drugs to stop Eliquis  allergy to abx none  post operative abx: ancef +/- vanc (per protocol)  special clearance needed cardiology      BMI Readings from Last 1 Encounters:   06/25/25 38.09 kg/m²      Lab Results   Component Value Date    CREATININE 0.74 06/04/2025     Tobacco Use: Medium Risk (8/5/2025)    Patient History     Smoking Tobacco Use: Former     Smokeless Tobacco Use: Never     Passive Exposure: Never      Computed MELD 3.0 unavailable. One or more values for this score either were not found within the given timeframe or did not fit some other criterion.  Computed MELD-Na unavailable. One or more values for this score either were not found within the given timeframe or did not fit some other criterion.       Lab Results   Component Value Date    HGBA1C 4.7 04/01/2025     Lab Results   Component Value Date    STAPHMRSASCR (A) 06/04/2025     Isolated: Methicillin Susceptible Staphylococcus aureus (MSSA)

## 2025-08-12 PROCEDURE — RXMED WILLOW AMBULATORY MEDICATION CHARGE

## 2025-08-14 ENCOUNTER — APPOINTMENT (OUTPATIENT)
Dept: NEPHROLOGY | Facility: CLINIC | Age: 70
End: 2025-08-14
Payer: MEDICARE

## 2025-08-14 ENCOUNTER — PHARMACY VISIT (OUTPATIENT)
Dept: PHARMACY | Facility: CLINIC | Age: 70
End: 2025-08-14
Payer: MEDICARE

## 2025-08-19 ENCOUNTER — APPOINTMENT (OUTPATIENT)
Dept: NEPHROLOGY | Facility: CLINIC | Age: 70
End: 2025-08-19
Payer: MEDICARE

## 2025-08-22 ENCOUNTER — PHARMACY VISIT (OUTPATIENT)
Dept: PHARMACY | Facility: CLINIC | Age: 70
End: 2025-08-22
Payer: MEDICARE

## 2025-08-22 PROCEDURE — RXMED WILLOW AMBULATORY MEDICATION CHARGE

## 2025-08-23 PROCEDURE — RXMED WILLOW AMBULATORY MEDICATION CHARGE

## 2025-08-26 ENCOUNTER — PHARMACY VISIT (OUTPATIENT)
Dept: PHARMACY | Facility: CLINIC | Age: 70
End: 2025-08-26
Payer: MEDICARE

## 2025-08-29 PROCEDURE — RXMED WILLOW AMBULATORY MEDICATION CHARGE

## 2025-09-02 ENCOUNTER — PHARMACY VISIT (OUTPATIENT)
Dept: PHARMACY | Facility: CLINIC | Age: 70
End: 2025-09-02
Payer: MEDICARE

## 2025-09-04 PROCEDURE — RXMED WILLOW AMBULATORY MEDICATION CHARGE

## 2025-09-05 ENCOUNTER — PHARMACY VISIT (OUTPATIENT)
Dept: PHARMACY | Facility: CLINIC | Age: 70
End: 2025-09-05
Payer: MEDICARE

## 2025-09-26 ENCOUNTER — APPOINTMENT (OUTPATIENT)
Dept: PRIMARY CARE | Facility: CLINIC | Age: 70
End: 2025-09-26
Payer: MEDICARE

## 2025-09-29 ENCOUNTER — APPOINTMENT (OUTPATIENT)
Dept: PRIMARY CARE | Facility: CLINIC | Age: 70
End: 2025-09-29
Payer: MEDICARE

## 2025-10-08 ENCOUNTER — APPOINTMENT (OUTPATIENT)
Dept: CARDIOLOGY | Facility: CLINIC | Age: 70
End: 2025-10-08
Payer: MEDICARE

## 2025-10-20 ENCOUNTER — APPOINTMENT (OUTPATIENT)
Dept: HEMATOLOGY/ONCOLOGY | Facility: CLINIC | Age: 70
End: 2025-10-20
Payer: MEDICARE

## 2025-11-04 ENCOUNTER — APPOINTMENT (OUTPATIENT)
Dept: ORTHOPEDIC SURGERY | Facility: CLINIC | Age: 70
End: 2025-11-04
Payer: MEDICARE

## 2025-11-07 ENCOUNTER — APPOINTMENT (OUTPATIENT)
Dept: PRIMARY CARE | Facility: CLINIC | Age: 70
End: 2025-11-07
Payer: MEDICARE

## 2025-12-30 ENCOUNTER — APPOINTMENT (OUTPATIENT)
Dept: CARDIOLOGY | Facility: CLINIC | Age: 70
End: 2025-12-30
Payer: MEDICARE

## (undated) DEVICE — KIT, MINOR, DOUBLE BASIN

## (undated) DEVICE — COVER, TABLE, 44X90

## (undated) DEVICE — SUTURE, STRATAFIX, SPIRAL PDS PLUS, 1-0, 9IN, 24CM, CT-1, VIOLET

## (undated) DEVICE — CATHETER TRAY, SURESTEP, 14FR, PRECONNECTED DRAIN BAG

## (undated) DEVICE — NEEDLE, SPINAL, QUINCKE, 18 G X 3.5 IN, PINK HUB

## (undated) DEVICE — HOOD, SURGICAL, FLYTE, T7 PLUS, PEEL AWAY SHIELD

## (undated) DEVICE — DRAPE, ISOLATION, ANTIMICROBIAL, IOBAN, W/FILM & POUCH, LARGE, 32 X 70 CM

## (undated) DEVICE — BLADE, OSCILLATOR 19.5 X 86 X 1.27MM

## (undated) DEVICE — POSITION KIT, HANA PROFX SPINE

## (undated) DEVICE — SUTURE, STRATAFIX, 3-0 MONOCRYL PLUS, PS-2 SPIRAL UNDYED

## (undated) DEVICE — IRRIGATION SYSTEM, WOUND, SURGIPHOR, 450ML, STERILE

## (undated) DEVICE — SUTURE, VICRYL, 1, 24 IN, CTD, UNDYED

## (undated) DEVICE — SEALER, BIPOLAR, AQUA MANTYS 6.0

## (undated) DEVICE — IRRIGATION SYSTEM, WOUND, PULSAVAC PLUS

## (undated) DEVICE — CLOSURE SYSTEM, DERMABOND, PRINEO, 22CM, STERILE

## (undated) DEVICE — SOLUTION, IRRIGATION, USP, 0.9% SODIUM CHL, 3000ML, TITAN XL

## (undated) DEVICE — DRAPE PACK, TOTAL HIP, CUSTOM, GEAUGA

## (undated) DEVICE — TIP, SUCTION, YANKAUER, FLEXIBLE

## (undated) DEVICE — DRAPE, C-ARM IMAGE

## (undated) DEVICE — APPLICATOR, CHLORAPREP, W/ORANGE TINT, 26ML

## (undated) DEVICE — DRAPE, SHEET, UTILITY, NON ABSORBENT, 18 X 26 IN, LF

## (undated) DEVICE — DRESSING, MEPILEX BORDER, POST-OP AG, 4 X 10 IN

## (undated) DEVICE — SUTURE, CTD, VICRYL, 2-0, UND, BR, CT-2

## (undated) DEVICE — SYRINGE, 30 CC, LUER LOCK